# Patient Record
Sex: FEMALE | Race: WHITE | NOT HISPANIC OR LATINO | Employment: OTHER | URBAN - METROPOLITAN AREA
[De-identification: names, ages, dates, MRNs, and addresses within clinical notes are randomized per-mention and may not be internally consistent; named-entity substitution may affect disease eponyms.]

---

## 2018-04-22 ENCOUNTER — OFFICE VISIT (OUTPATIENT)
Dept: RETAIL CLINIC | Facility: CLINIC | Age: 68
End: 2018-04-22

## 2018-04-22 VITALS
DIASTOLIC BLOOD PRESSURE: 64 MMHG | HEIGHT: 64 IN | HEART RATE: 96 BPM | RESPIRATION RATE: 20 BRPM | SYSTOLIC BLOOD PRESSURE: 108 MMHG | WEIGHT: 158 LBS | TEMPERATURE: 98.4 F | BODY MASS INDEX: 26.98 KG/M2 | OXYGEN SATURATION: 97 %

## 2018-04-22 DIAGNOSIS — J01.00 ACUTE MAXILLARY SINUSITIS, RECURRENCE NOT SPECIFIED: ICD-10-CM

## 2018-04-22 DIAGNOSIS — J06.9 ACUTE URI: Primary | ICD-10-CM

## 2018-04-22 LAB
EXPIRATION DATE: NORMAL
FLUAV AG NPH QL: NEGATIVE
FLUBV AG NPH QL: NEGATIVE
INTERNAL CONTROL: NORMAL
Lab: NORMAL

## 2018-04-22 PROCEDURE — 99203 OFFICE O/P NEW LOW 30 MIN: CPT | Performed by: NURSE PRACTITIONER

## 2018-04-22 PROCEDURE — 87804 INFLUENZA ASSAY W/OPTIC: CPT | Performed by: NURSE PRACTITIONER

## 2018-04-22 RX ORDER — AMOXICILLIN AND CLAVULANATE POTASSIUM 875; 125 MG/1; MG/1
1 TABLET, FILM COATED ORAL 2 TIMES DAILY
Qty: 14 TABLET | Refills: 0 | Status: SHIPPED | OUTPATIENT
Start: 2018-04-22 | End: 2018-04-29

## 2018-04-22 RX ORDER — ESTRADIOL 0.1 MG/G
2 CREAM VAGINAL 3 TIMES WEEKLY
COMMUNITY
End: 2019-08-09

## 2018-04-22 RX ORDER — ASPIRIN 81 MG/1
81 TABLET ORAL DAILY
COMMUNITY
End: 2022-03-16

## 2018-04-22 RX ORDER — ESTRADIOL 0.05 MG/D
1 PATCH TRANSDERMAL 3 TIMES WEEKLY
COMMUNITY
End: 2019-08-09

## 2018-04-22 NOTE — PATIENT INSTRUCTIONS
Sinusitis, Adult  Sinusitis is soreness and inflammation of your sinuses. Sinuses are hollow spaces in the bones around your face. They are located:  · Around your eyes.  · In the middle of your forehead.  · Behind your nose.  · In your cheekbones.  Your sinuses and nasal passages are lined with a stringy fluid (mucus). Mucus normally drains out of your sinuses. When your nasal tissues get inflamed or swollen, the mucus can get trapped or blocked so air cannot flow through your sinuses. This lets bacteria, viruses, and funguses grow, and that leads to infection.  Follow these instructions at home:  Medicines   · Take, use, or apply over-the-counter and prescription medicines only as told by your doctor. These may include nasal sprays.  · If you were prescribed an antibiotic medicine, take it as told by your doctor. Do not stop taking the antibiotic even if you start to feel better.  Hydrate and Humidify   · Drink enough water to keep your pee (urine) clear or pale yellow.  · Use a cool mist humidifier to keep the humidity level in your home above 50%.  · Breathe in steam for 10-15 minutes, 3-4 times a day or as told by your doctor. You can do this in the bathroom while a hot shower is running.  · Try not to spend time in cool or dry air.  Rest   · Rest as much as possible.  · Sleep with your head raised (elevated).  · Make sure to get enough sleep each night.  General instructions   · Put a warm, moist washcloth on your face 3-4 times a day or as told by your doctor. This will help with discomfort.  · Wash your hands often with soap and water. If there is no soap and water, use hand .  · Do not smoke. Avoid being around people who are smoking (secondhand smoke).  · Keep all follow-up visits as told by your doctor. This is important.  Contact a doctor if:  · You have a fever.  · Your symptoms get worse.  · Your symptoms do not get better within 10 days.  Get help right away if:  · You have a very bad  "headache.  · You cannot stop throwing up (vomiting).  · You have pain or swelling around your face or eyes.  · You have trouble seeing.  · You feel confused.  · Your neck is stiff.  · You have trouble breathing.  This information is not intended to replace advice given to you by your health care provider. Make sure you discuss any questions you have with your health care provider.  Document Released: 06/05/2009 Document Revised: 08/13/2017 Document Reviewed: 10/12/2016  [employer] Interactive Patient Education © 2017 [employer]  Upper Respiratory Infection, Adult  Most upper respiratory infections (URIs) are caused by a virus. A URI affects the nose, throat, and upper air passages. The most common type of URI is often called \"the common cold.\"  Follow these instructions at home:  · Take medicines only as told by your doctor.  · Gargle warm saltwater or take cough drops to comfort your throat as told by your doctor.  · Use a warm mist humidifier or inhale steam from a shower to increase air moisture. This may make it easier to breathe.  · Drink enough fluid to keep your pee (urine) clear or pale yellow.  · Eat soups and other clear broths.  · Have a healthy diet.  · Rest as needed.  · Go back to work when your fever is gone or your doctor says it is okay.  ¨ You may need to stay home longer to avoid giving your URI to others.  ¨ You can also wear a face mask and wash your hands often to prevent spread of the virus.  · Use your inhaler more if you have asthma.  · Do not use any tobacco products, including cigarettes, chewing tobacco, or electronic cigarettes. If you need help quitting, ask your doctor.  Contact a doctor if:  · You are getting worse, not better.  · Your symptoms are not helped by medicine.  · You have chills.  · You are getting more short of breath.  · You have brown or red mucus.  · You have yellow or brown discharge from your nose.  · You have pain in your face, especially when you bend " forward.  · You have a fever.  · You have puffy (swollen) neck glands.  · You have pain while swallowing.  · You have white areas in the back of your throat.  Get help right away if:  · You have very bad or constant:  ¨ Headache.  ¨ Ear pain.  ¨ Pain in your forehead, behind your eyes, and over your cheekbones (sinus pain).  ¨ Chest pain.  · You have long-lasting (chronic) lung disease and any of the following:  ¨ Wheezing.  ¨ Long-lasting cough.  ¨ Coughing up blood.  ¨ A change in your usual mucus.  · You have a stiff neck.  · You have changes in your:  ¨ Vision.  ¨ Hearing.  ¨ Thinking.  ¨ Mood.  This information is not intended to replace advice given to you by your health care provider. Make sure you discuss any questions you have with your health care provider.  Document Released: 06/05/2009 Document Revised: 08/20/2017 Document Reviewed: 03/25/2015  [employer] Interactive Patient Education © 2017 [employer]    Flu negative, discussed with patient  Medications and plan of care reviewed with patient and teaching done on medication reactions/side effects. Take Mucinex as discussed. Take medication as prescribed, do not take over the counter medications except as discussed, or drink alcohol while on medication.  Monitor for drowsiness with medication, do not drive if drowsiness occurs.  Rest, plenty of fluids, comfort measures, humidifier, warm salt water gargles, saline spray, fever/pain control, and follow up with PCP if symptoms persist.  If worse in any way go to urgent care or ER as discussed.

## 2018-04-22 NOTE — PROGRESS NOTES
Subjective   Racheal De La Vega is a 67 y.o. female.     Flu Symptoms   This is a new problem. The current episode started in the past 7 days. The problem has been gradually worsening. Associated symptoms include chills (intermittent), congestion, coughing (productive yellow), fatigue, headaches, myalgias and a sore throat. Pertinent negatives include no abdominal pain, chest pain, fever, nausea, rash, urinary symptoms or vomiting. Nothing aggravates the symptoms. She has tried NSAIDs (arthritis med OTC for aches) for the symptoms. The treatment provided mild relief.        The following portions of the patient's history were reviewed and updated as appropriate: allergies, current medications, past family history, past medical history, past social history and past surgical history.    Review of Systems   Constitutional: Positive for chills (intermittent) and fatigue. Negative for fever and unexpected weight change.   HENT: Positive for congestion, postnasal drip, rhinorrhea, sinus pressure and sore throat. Negative for ear discharge, ear pain, trouble swallowing and voice change.    Eyes: Negative.    Respiratory: Positive for cough (productive yellow) and chest tightness (chest congestion). Negative for shortness of breath, wheezing and stridor.    Cardiovascular: Negative.  Negative for chest pain.   Gastrointestinal: Negative.  Negative for abdominal pain, diarrhea, nausea and vomiting.   Genitourinary: Negative.    Musculoskeletal: Positive for myalgias.   Skin: Negative.  Negative for rash.   Neurological: Positive for headaches. Negative for dizziness and syncope.       Objective   Physical Exam   Constitutional: She is oriented to person, place, and time. She appears well-developed and well-nourished. No distress.   HENT:   Head: Normocephalic and atraumatic.   Right Ear: A middle ear effusion is present.   Left Ear: A middle ear effusion is present.   Nose: Mucosal edema and sinus tenderness (mild tenderness  on palpation) present. Right sinus exhibits maxillary sinus tenderness. Left sinus exhibits maxillary sinus tenderness.   Mouth/Throat: Uvula is midline and mucous membranes are normal. Posterior oropharyngeal erythema (mild erythema with PND) present. Tonsils are 0 on the right. Tonsils are 0 on the left. No tonsillar exudate.   Eyes: Conjunctivae and lids are normal. Pupils are equal, round, and reactive to light.   Neck: Normal range of motion.   Cardiovascular: Normal rate, regular rhythm and normal heart sounds.    No murmur heard.  Pulmonary/Chest: Effort normal and breath sounds normal. No respiratory distress.   Lymphadenopathy:     She has no cervical adenopathy.   Neurological: She is alert and oriented to person, place, and time.   Skin: Skin is warm and dry.   Psychiatric: She has a normal mood and affect. Her speech is normal and behavior is normal.       Assessment/Plan   Racheal was seen today for flu symptoms.    Diagnoses and all orders for this visit:    Acute URI  -     POC Influenza A / B  Results for orders placed or performed in visit on 04/22/18   POC Influenza A / B   Result Value Ref Range    Rapid Influenza A Ag negative     Rapid Influenza B Ag negative     Internal Control Passed Passed    Lot Number [id number]     Expiration Date 8/2,020        Acute maxillary sinusitis, recurrence not specified  -     amoxicillin-clavulanate (AUGMENTIN) 875-125 MG per tablet; Take 1 tablet by mouth 2 (Two) Times a Day for 7 days. (take if worse as discussed)      Flu negative, discussed with patient  Medications and plan of care reviewed with patient and teaching done on medication reactions/side effects. Take Mucinex as discussed. Take medication as prescribed, do not take over the counter medications except as discussed, or drink alcohol while on medication.  Monitor for drowsiness with medication, do not drive if drowsiness occurs.  Rest, plenty of fluids, comfort measures, humidifier, warm salt water  gargles, saline spray, fever/pain control, and follow up with PCP if symptoms persist.  If worse in any way go to urgent care or ER as discussed.  Patient verbalized understanding.    NATALIE Marie

## 2019-03-09 ENCOUNTER — OFFICE VISIT (OUTPATIENT)
Dept: RETAIL CLINIC | Facility: CLINIC | Age: 69
End: 2019-03-09

## 2019-03-09 VITALS
HEART RATE: 90 BPM | DIASTOLIC BLOOD PRESSURE: 64 MMHG | RESPIRATION RATE: 18 BRPM | SYSTOLIC BLOOD PRESSURE: 118 MMHG | OXYGEN SATURATION: 98 % | TEMPERATURE: 97.8 F

## 2019-03-09 DIAGNOSIS — J40 BRONCHITIS: Primary | ICD-10-CM

## 2019-03-09 DIAGNOSIS — J06.9 ACUTE URI: ICD-10-CM

## 2019-03-09 PROCEDURE — 99213 OFFICE O/P EST LOW 20 MIN: CPT | Performed by: NURSE PRACTITIONER

## 2019-03-09 RX ORDER — METHYLPREDNISOLONE 4 MG/1
TABLET ORAL
Qty: 21 EACH | Refills: 0 | Status: SHIPPED | OUTPATIENT
Start: 2019-03-09 | End: 2019-12-23

## 2019-03-09 RX ORDER — BROMPHENIRAMINE MALEATE, PSEUDOEPHEDRINE HYDROCHLORIDE, AND DEXTROMETHORPHAN HYDROBROMIDE 2; 30; 10 MG/5ML; MG/5ML; MG/5ML
5 SYRUP ORAL 4 TIMES DAILY PRN
Qty: 118 ML | Refills: 0 | Status: SHIPPED | OUTPATIENT
Start: 2019-03-09 | End: 2019-12-23

## 2019-03-09 NOTE — PROGRESS NOTES
Mis De La Vega is a 68 y.o. female.     URI    This is a new problem. The current episode started in the past 7 days. The problem has been gradually worsening. There has been no fever. Associated symptoms include congestion, coughing, headaches, rhinorrhea, sinus pain, sneezing, a sore throat and wheezing. Pertinent negatives include no nausea or vomiting. She has tried acetaminophen (sudafed, zyrtec, ) for the symptoms.        The following portions of the patient's history were reviewed and updated as appropriate: allergies, current medications, past family history, past medical history, past social history, past surgical history and problem list.    Review of Systems   HENT: Positive for congestion, rhinorrhea, sneezing and sore throat.    Respiratory: Positive for cough, shortness of breath and wheezing.    Gastrointestinal: Negative for nausea and vomiting.   Neurological: Positive for headache.       Objective   Physical Exam   Constitutional: She is cooperative. No distress.   HENT:   Head: Normocephalic.   Right Ear: Hearing, external ear and ear canal normal. Tympanic membrane is not erythematous. A middle ear effusion is present.   Left Ear: Hearing, external ear and ear canal normal. Tympanic membrane is not erythematous. A middle ear effusion is present.   Nose: Mucosal edema, rhinorrhea and congestion present.   Mouth/Throat: Posterior oropharyngeal erythema present.   Eyes: Conjunctivae, EOM and lids are normal. Pupils are equal, round, and reactive to light.   Neck: Trachea normal and full passive range of motion without pain.   Cardiovascular: Normal rate, regular rhythm and normal pulses.   Pulmonary/Chest: Effort normal. She has wheezes in the right upper field and the left upper field.   Lymphadenopathy:     She has cervical adenopathy.        Right cervical: Superficial cervical adenopathy present.   Neurological: She is alert.   Skin: Skin is warm. Capillary refill takes less  than 2 seconds.   Psychiatric: She has a normal mood and affect. Her speech is normal and behavior is normal.   Vitals reviewed.        Assessment/Plan   Racheal was seen today for uri.    Diagnoses and all orders for this visit:    Bronchitis    Acute URI    Other orders  -     methylPREDNISolone (MEDROL, ÁNGEL,) 4 MG tablet; Take as directed on package instructions.  -     brompheniramine-pseudoephedrine-DM (BROMFED DM) 30-2-10 MG/5ML syrup; Take 5 mL by mouth 4 (Four) Times a Day As Needed for Allergies.        If any worsening of symptoms follow up with PCP

## 2019-08-09 ENCOUNTER — OFFICE VISIT (OUTPATIENT)
Dept: RETAIL CLINIC | Facility: CLINIC | Age: 69
End: 2019-08-09

## 2019-08-09 VITALS
WEIGHT: 158 LBS | SYSTOLIC BLOOD PRESSURE: 112 MMHG | OXYGEN SATURATION: 98 % | TEMPERATURE: 98.1 F | HEIGHT: 65 IN | RESPIRATION RATE: 16 BRPM | DIASTOLIC BLOOD PRESSURE: 78 MMHG | BODY MASS INDEX: 26.33 KG/M2 | HEART RATE: 75 BPM

## 2019-08-09 DIAGNOSIS — N30.01 ACUTE CYSTITIS WITH HEMATURIA: Primary | ICD-10-CM

## 2019-08-09 DIAGNOSIS — R30.0 DYSURIA: ICD-10-CM

## 2019-08-09 LAB
BILIRUB BLD-MCNC: NEGATIVE MG/DL
CLARITY, POC: CLEAR
COLOR UR: YELLOW
GLUCOSE UR STRIP-MCNC: NEGATIVE MG/DL
KETONES UR QL: NEGATIVE
LEUKOCYTE EST, POC: ABNORMAL
NITRITE UR-MCNC: NEGATIVE MG/ML
PH UR: 5 [PH] (ref 5–8)
PROT UR STRIP-MCNC: NEGATIVE MG/DL
RBC # UR STRIP: ABNORMAL /UL
SP GR UR: 1.01 (ref 1–1.03)
UROBILINOGEN UR QL: NORMAL

## 2019-08-09 PROCEDURE — 99213 OFFICE O/P EST LOW 20 MIN: CPT | Performed by: NURSE PRACTITIONER

## 2019-08-09 RX ORDER — PHENAZOPYRIDINE HYDROCHLORIDE 200 MG/1
200 TABLET, FILM COATED ORAL 3 TIMES DAILY PRN
Qty: 6 TABLET | Refills: 0 | Status: SHIPPED | OUTPATIENT
Start: 2019-08-09 | End: 2019-08-11

## 2019-08-09 RX ORDER — ESTRADIOL 0.05 MG/D
FILM, EXTENDED RELEASE TRANSDERMAL
Refills: 1 | COMMUNITY
Start: 2019-08-06 | End: 2019-12-23 | Stop reason: ALTCHOICE

## 2019-08-09 RX ORDER — NITROFURANTOIN 25; 75 MG/1; MG/1
100 CAPSULE ORAL 2 TIMES DAILY
Qty: 10 CAPSULE | Refills: 0 | Status: SHIPPED | OUTPATIENT
Start: 2019-08-09 | End: 2019-08-14

## 2019-08-09 NOTE — PROGRESS NOTES
Subjective   Chief Complaint   Patient presents with   • Urinary Tract Infection       Racheal De La Vega is a 68 y.o. female.     Urinary Tract Infection    This is a new problem. Episode onset: 3 days. The problem occurs every urination. The problem has been gradually worsening. The quality of the pain is described as burning and aching. The pain is moderate. There has been no fever. Associated symptoms include flank pain, frequency and urgency. Pertinent negatives include no chills, discharge, hematuria, hesitancy, nausea, possible pregnancy, sweats or vomiting. She has tried nothing for the symptoms.        No Known Allergies    Past Medical History:   Diagnosis Date   • Diverticulitis of colon        Past Surgical History:   Procedure Laterality Date   • BLADDER SURGERY     • CATARACT EXTRACTION, BILATERAL     • JOINT REPLACEMENT         Social History     Socioeconomic History   • Marital status:      Spouse name: Not on file   • Number of children: Not on file   • Years of education: Not on file   • Highest education level: Not on file   Tobacco Use   • Smoking status: Former Smoker     Last attempt to quit:      Years since quittin.6   Substance and Sexual Activity   • Alcohol use: No   • Drug use: No   • Sexual activity: Defer       History reviewed. No pertinent family history.      Current Outpatient Medications:   •  aspirin 81 MG EC tablet, Take 81 mg by mouth Daily., Disp: , Rfl:   •  Calcium Carbonate-Vitamin D (CALTRATE 600+D PO), Take  by mouth., Disp: , Rfl:   •  cholecalciferol (VITAMIN D3) 1000 units tablet, Take 1,000 Units by mouth Daily., Disp: , Rfl:   •  estradiol (MINIVELLE, VIVELLE-DOT) 0.05 MG/24HR patch, APPLY 1 PATCH TOPICALLY TWICE A WEEK, Disp: , Rfl: 1  •  brompheniramine-pseudoephedrine-DM (BROMFED DM) 30-2-10 MG/5ML syrup, Take 5 mL by mouth 4 (Four) Times a Day As Needed for Allergies., Disp: 118 mL, Rfl: 0  •  methylPREDNISolone (MEDROL, ÁNGEL,) 4 MG tablet, Take as  "directed on package instructions., Disp: 21 each, Rfl: 0  •  nitrofurantoin, macrocrystal-monohydrate, (MACROBID) 100 MG capsule, Take 1 capsule by mouth 2 (Two) Times a Day for 5 days., Disp: 10 capsule, Rfl: 0  •  phenazopyridine (PYRIDIUM) 200 MG tablet, Take 1 tablet by mouth 3 (Three) Times a Day As Needed for bladder spasms for up to 2 days., Disp: 6 tablet, Rfl: 0      Review of Systems   Constitutional: Negative.  Negative for chills.   Gastrointestinal: Negative for abdominal distention, abdominal pain, nausea and vomiting.   Genitourinary: Positive for dysuria, flank pain, frequency, pelvic pain and urgency. Negative for urinary incontinence, decreased urine volume, hematuria and hesitancy.        Vitals:    08/09/19 1402   BP: 112/78   BP Location: Left arm   Patient Position: Sitting   Pulse: 75   Resp: 16   Temp: 98.1 °F (36.7 °C)   TempSrc: Oral   SpO2: 98%   Weight: 71.7 kg (158 lb)   Height: 163.8 cm (64.5\")       Objective   Physical Exam   Constitutional: She is oriented to person, place, and time. She appears well-developed and well-nourished.   HENT:   Head: Normocephalic.   Eyes: Conjunctivae are normal.   Cardiovascular: Normal rate, regular rhythm, S1 normal, S2 normal and normal heart sounds.   Pulmonary/Chest: Effort normal and breath sounds normal.   Abdominal: Soft. Normal appearance. There is tenderness in the suprapubic area. There is no CVA tenderness.   Lymphadenopathy:     She has no cervical adenopathy.   Neurological: She is alert and oriented to person, place, and time.        Procedures     Assessment/Plan   Racheal was seen today for urinary tract infection.    Diagnoses and all orders for this visit:    Acute cystitis with hematuria  -     nitrofurantoin, macrocrystal-monohydrate, (MACROBID) 100 MG capsule; Take 1 capsule by mouth 2 (Two) Times a Day for 5 days.  -     phenazopyridine (PYRIDIUM) 200 MG tablet; Take 1 tablet by mouth 3 (Three) Times a Day As Needed for bladder " spasms for up to 2 days.    Dysuria  -     POC Urinalysis Dipstick, Automated  -     nitrofurantoin, macrocrystal-monohydrate, (MACROBID) 100 MG capsule; Take 1 capsule by mouth 2 (Two) Times a Day for 5 days.  -     phenazopyridine (PYRIDIUM) 200 MG tablet; Take 1 tablet by mouth 3 (Three) Times a Day As Needed for bladder spasms for up to 2 days.        Results for orders placed or performed in visit on 08/09/19   POC Urinalysis Dipstick, Automated   Result Value Ref Range    Color Yellow Yellow, Straw, Dark Yellow, Allison    Clarity, UA Clear Clear    Specific Gravity  1.010 1.005 - 1.030    pH, Urine 5.0 5.0 - 8.0    Leukocytes Moderate (2+) (A) Negative    Nitrite, UA Negative Negative    Protein, POC Negative Negative mg/dL    Glucose, UA Negative Negative, 1000 mg/dL (3+) mg/dL    Ketones, UA Negative Negative    Urobilinogen, UA Normal Normal    Bilirubin Negative Negative    Blood, UA Trace (A) Negative       PLAN: Discussed dosing, side effects, recommended other symptomatic care.  Patient should follow up with primary care provider if symptoms worsen, fail to resolve or other symptoms need attention. Patient/family agree to the above. Advised to wipe front to back, stay hydrated with water to flush the kidneys, avoid caffeinated beverages.    An After Visit Summary was printed and given to the patient.    NATALIE Busch

## 2019-08-09 NOTE — PATIENT INSTRUCTIONS
Urinary Tract Infection, Adult  A urinary tract infection (UTI) is an infection of any part of the urinary tract. The urinary tract includes the:  · Kidneys.  · Ureters.  · Bladder.  · Urethra.  These organs make, store, and get rid of pee (urine) in the body.  Follow these instructions at home:    · Take over-the-counter and prescription medicines only as told by your doctor.  · If you were prescribed an antibiotic medicine, take it as told by your doctor. Do not stop taking it even if you start to feel better.  · Drink enough fluid to keep your pee (urine) pale yellow. For most people, this is 6-10 glasses of water each day.  · Keep all follow-up visits as told by your doctor. This is important.  · Make sure you:  ? Empty your bladder often and completely. Do not hold pee for long periods of time.  ? Empty your bladder after sex.  ? Wipe from front to back after a bowel movement if you are female. Use each tissue one time when you wipe.  Contact a doctor if:  · Your symptoms do not get better after 1-2 days.  · Your symptoms go away and then come back.  Get help right away if:  · You have very bad pain in your back.  · You have very bad pain in your lower belly (abdomen).  · You have a fever.  · You are sick to your stomach (nauseous).  · You are throwing up (vomiting).  Summary  · A urinary tract infection (UTI) is an infection of any part of the urinary tract.  · If you were prescribed an antibiotic medicine, take it as told by your doctor. Do not stop taking it even if you start to feel better.  · Drink enough fluid to keep your pee (urine) pale yellow.  This information is not intended to replace advice given to you by your health care provider. Make sure you discuss any questions you have with your health care provider.  Document Released: 06/05/2009 Document Revised: 02/12/2019 Document Reviewed: 11/07/2016  [employer] Patient Education © 2019 [employer]

## 2019-12-23 ENCOUNTER — OFFICE VISIT (OUTPATIENT)
Dept: RETAIL CLINIC | Facility: CLINIC | Age: 69
End: 2019-12-23

## 2019-12-23 VITALS
DIASTOLIC BLOOD PRESSURE: 78 MMHG | SYSTOLIC BLOOD PRESSURE: 124 MMHG | TEMPERATURE: 98.2 F | RESPIRATION RATE: 20 BRPM | HEIGHT: 65 IN | HEART RATE: 68 BPM | WEIGHT: 159 LBS | OXYGEN SATURATION: 98 % | BODY MASS INDEX: 26.49 KG/M2

## 2019-12-23 DIAGNOSIS — J06.9 VIRAL UPPER RESPIRATORY TRACT INFECTION: Primary | ICD-10-CM

## 2019-12-23 PROCEDURE — 99213 OFFICE O/P EST LOW 20 MIN: CPT | Performed by: NURSE PRACTITIONER

## 2019-12-23 RX ORDER — ATORVASTATIN CALCIUM 40 MG/1
40 TABLET, FILM COATED ORAL DAILY
COMMUNITY
Start: 2019-10-28

## 2019-12-23 RX ORDER — AZITHROMYCIN 250 MG/1
TABLET, FILM COATED ORAL
Qty: 6 TABLET | Refills: 0 | Status: SHIPPED | OUTPATIENT
Start: 2019-12-23 | End: 2021-09-18

## 2019-12-23 RX ORDER — BROMPHENIRAMINE MALEATE, PSEUDOEPHEDRINE HYDROCHLORIDE, AND DEXTROMETHORPHAN HYDROBROMIDE 2; 30; 10 MG/5ML; MG/5ML; MG/5ML
5 SYRUP ORAL 4 TIMES DAILY PRN
Qty: 120 ML | Refills: 0 | Status: SHIPPED | OUTPATIENT
Start: 2019-12-23 | End: 2021-09-18

## 2019-12-23 RX ORDER — ESTRADIOL 0.03 MG/D
1 FILM, EXTENDED RELEASE TRANSDERMAL 2 TIMES WEEKLY
COMMUNITY
Start: 2019-11-11

## 2019-12-23 NOTE — PATIENT INSTRUCTIONS
"Upper Respiratory Infection, Adult  An upper respiratory infection (URI) affects the nose, throat, and upper air passages. URIs are caused by germs (viruses). The most common type of URI is often called \"the common cold.\"  Medicines cannot cure URIs, but you can do things at home to relieve your symptoms. URIs usually get better within 7-10 days.  Follow these instructions at home:  Activity  · Rest as needed.  · If you have a fever, stay home from work or school until your fever is gone, or until your doctor says you may return to work or school.  ? You should stay home until you cannot spread the infection anymore (you are not contagious).  ? Your doctor may have you wear a face mask so you have less risk of spreading the infection.  Relieving symptoms  · Gargle with a salt-water mixture 3-4 times a day or as needed. To make a salt-water mixture, completely dissolve ½-1 tsp of salt in 1 cup of warm water.  · Use a cool-mist humidifier to add moisture to the air. This can help you breathe more easily.  Eating and drinking    · Drink enough fluid to keep your pee (urine) pale yellow.  · Eat soups and other clear broths.  General instructions    · Take over-the-counter and prescription medicines only as told by your doctor. These include cold medicines, fever reducers, and cough suppressants.  · Do not use any products that contain nicotine or tobacco. These include cigarettes and e-cigarettes. If you need help quitting, ask your doctor.  · Avoid being where people are smoking (avoid secondhand smoke).  · Make sure you get regular shots and get the flu shot every year.  · Keep all follow-up visits as told by your doctor. This is important.  How to avoid spreading infection to others    · Wash your hands often with soap and water. If you do not have soap and water, use hand .  · Avoid touching your mouth, face, eyes, or nose.  · Cough or sneeze into a tissue or your sleeve or elbow. Do not cough or sneeze " "into your hand or into the air.  Contact a doctor if:  · You are getting worse, not better.  · You have any of these:  ? A fever.  ? Chills.  ? Brown or red mucus in your nose.  ? Yellow or brown fluid (discharge)coming from your nose.  ? Pain in your face, especially when you bend forward.  ? Swollen neck glands.  ? Pain with swallowing.  ? White areas in the back of your throat.  Get help right away if:  · You have shortness of breath that gets worse.  · You have very bad or constant:  ? Headache.  ? Ear pain.  ? Pain in your forehead, behind your eyes, and over your cheekbones (sinus pain).  ? Chest pain.  · You have long-lasting (chronic) lung disease along with any of these:  ? Wheezing.  ? Long-lasting cough.  ? Coughing up blood.  ? A change in your usual mucus.  · You have a stiff neck.  · You have changes in your:  ? Vision.  ? Hearing.  ? Thinking.  ? Mood.  Summary  · An upper respiratory infection (URI) is caused by a germ called a virus. The most common type of URI is often called \"the common cold.\"  · URIs usually get better within 7-10 days.  · Take over-the-counter and prescription medicines only as told by your doctor.  This information is not intended to replace advice given to you by your health care provider. Make sure you discuss any questions you have with your health care provider.  Document Released: 06/05/2009 Document Revised: 08/10/2018 Document Reviewed: 08/10/2018  [employer] Interactive Patient Education © 2019 [employer]    "

## 2019-12-23 NOTE — PROGRESS NOTES
"Mis De La Vega is a 69 y.o. female.   Chief Complaint   Patient presents with   • URI      70 yo w/f presents with complaint of cough for a week or more and \"I need an antibiotic\".  She denies fever, shortness of breath or wheezing.  She has taken no otc medication for symptom. Refer to HPI/ROS for additional information.    URI    This is a new problem. The current episode started in the past 7 days. The problem has been gradually worsening. There has been no fever. Associated symptoms include congestion and coughing. Pertinent negatives include no ear pain, rhinorrhea, sinus pain, sore throat or wheezing. She has tried nothing for the symptoms.        The following portions of the patient's history were reviewed and updated as appropriate: allergies, current medications, past family history, past medical history, past social history, past surgical history and problem list.    Current Outpatient Medications:   •  aspirin 81 MG EC tablet, Take 81 mg by mouth Daily., Disp: , Rfl:   •  atorvastatin (LIPITOR) 40 MG tablet, Take 40 mg by mouth Daily., Disp: , Rfl:   •  Calcium Carbonate-Vitamin D (CALTRATE 600+D PO), Take  by mouth., Disp: , Rfl:   •  cholecalciferol (VITAMIN D3) 1000 units tablet, Take 1,000 Units by mouth Daily., Disp: , Rfl:   •  Estradiol (ESTRACE VA), Insert  into the vagina., Disp: , Rfl:   •  estradiol (VIVELLE-DOT) 0.025 MG/24HR patch, APPLY 1 PATCH TOPICALLY TWICE A WEEK AS DIRECTED, Disp: , Rfl:   •  azithromycin (ZITHROMAX Z-ÁNGEL) 250 MG tablet, Take 2 tablets the first day, then 1 tablet daily for 4 days., Disp: 6 tablet, Rfl: 0  •  brompheniramine-pseudoephedrine-DM 30-2-10 MG/5ML syrup, Take 5 mL by mouth 4 (Four) Times a Day As Needed for Congestion or Cough., Disp: 120 mL, Rfl: 0    Review of Systems   Constitutional: Negative.    HENT: Positive for congestion. Negative for ear pain, rhinorrhea, sinus pressure, sinus pain, sore throat and trouble swallowing.    Eyes: " "Negative.    Respiratory: Positive for cough. Negative for apnea, choking, chest tightness, shortness of breath, wheezing and stridor.    Cardiovascular: Negative.    Gastrointestinal: Negative.    Psychiatric/Behavioral: Negative.      /78   Pulse 68   Temp 98.2 °F (36.8 °C) (Oral)   Resp 20   Ht 163.8 cm (64.5\")   Wt 72.1 kg (159 lb)   SpO2 98%   BMI 26.87 kg/m²     Objective   No Known Allergies    Physical Exam   Constitutional: She is oriented to person, place, and time. She appears well-developed and well-nourished. No distress.   HENT:   Right Ear: External ear normal.   Left Ear: External ear normal.   Nose: Nose normal.   Mouth/Throat: Oropharynx is clear and moist. No oropharyngeal exudate.   Eyes: Conjunctivae are normal.   Neck: Normal range of motion. Neck supple. No JVD present. No tracheal deviation present. No thyromegaly present.   Cardiovascular: Normal rate, regular rhythm and normal heart sounds.   Pulmonary/Chest: Effort normal and breath sounds normal. No stridor. No respiratory distress. She has no wheezes. She has no rales. She exhibits no tenderness.   Neurological: She is alert and oriented to person, place, and time.   Skin: Skin is warm. Capillary refill takes less than 2 seconds. She is not diaphoretic.   Psychiatric: She has a normal mood and affect. Her behavior is normal. Judgment and thought content normal.   Vitals reviewed.      Assessment/Plan   Racheal was seen today for uri.    Diagnoses and all orders for this visit:    Viral upper respiratory tract infection    Other orders  -     azithromycin (ZITHROMAX Z-ÁNGEL) 250 MG tablet; Take 2 tablets the first day, then 1 tablet daily for 4 days.  -     brompheniramine-pseudoephedrine-DM 30-2-10 MG/5ML syrup; Take 5 mL by mouth 4 (Four) Times a Day As Needed for Congestion or Cough.        Your illness appears viral at this time and will not respond to antibiotics.  Pt voices concern that illness will become bacterial and " they will need an antibiotic.  Antibiotic is prescribed today with the agreement that pt will wait 3-4 days to fill it, and will begin taking it only if illness is not improving or is worse at that time. Pt verbalizes understanding and agrees with treatment plan.    An After Visit Summary was printed, reviewed, and given to the patient. Understanding verbalized and agrees with treatment plan.  If no improvement or becomes worse, follow up with primary or go to [facility]/ER.          December 23, 2019 4:13 PM

## 2021-06-22 ENCOUNTER — HOSPITAL ENCOUNTER (EMERGENCY)
Facility: HOSPITAL | Age: 71
Discharge: HOME | End: 2021-06-22
Attending: EMERGENCY MEDICINE | Admitting: EMERGENCY MEDICINE

## 2021-06-22 ENCOUNTER — APPOINTMENT (OUTPATIENT)
Dept: CARDIOLOGY | Facility: HOSPITAL | Age: 71
End: 2021-06-22

## 2021-06-22 VITALS
SYSTOLIC BLOOD PRESSURE: 118 MMHG | RESPIRATION RATE: 18 BRPM | WEIGHT: 157 LBS | DIASTOLIC BLOOD PRESSURE: 91 MMHG | BODY MASS INDEX: 26.8 KG/M2 | TEMPERATURE: 98.7 F | HEIGHT: 64 IN | OXYGEN SATURATION: 98 % | HEART RATE: 74 BPM

## 2021-06-22 DIAGNOSIS — R25.2 MUSCLE CRAMPS: ICD-10-CM

## 2021-06-22 DIAGNOSIS — M79.622 LEFT UPPER ARM PAIN: Primary | ICD-10-CM

## 2021-06-22 LAB
ALBUMIN SERPL-MCNC: 3.9 G/DL (ref 3.5–5.2)
ALBUMIN/GLOB SERPL: 1.5 G/DL
ALP SERPL-CCNC: 101 U/L (ref 39–117)
ALT SERPL W P-5'-P-CCNC: 16 U/L (ref 1–33)
ANION GAP SERPL CALCULATED.3IONS-SCNC: 9 MMOL/L (ref 5–15)
AST SERPL-CCNC: 19 U/L (ref 1–32)
BASOPHILS # BLD AUTO: 0.09 10*3/MM3 (ref 0–0.2)
BASOPHILS NFR BLD AUTO: 1 % (ref 0–1.5)
BH CV UPPER VENOUS LEFT AXILLARY AUGMENT: NORMAL
BH CV UPPER VENOUS LEFT AXILLARY COMPETENT: NORMAL
BH CV UPPER VENOUS LEFT AXILLARY COMPRESS: NORMAL
BH CV UPPER VENOUS LEFT AXILLARY PHASIC: NORMAL
BH CV UPPER VENOUS LEFT AXILLARY SPONT: NORMAL
BH CV UPPER VENOUS LEFT BASILIC FOREARM COMPRESS: NORMAL
BH CV UPPER VENOUS LEFT BASILIC UPPER COMPRESS: NORMAL
BH CV UPPER VENOUS LEFT BRACHIAL AUGMENT: NORMAL
BH CV UPPER VENOUS LEFT BRACHIAL COMPRESS: NORMAL
BH CV UPPER VENOUS LEFT CEPHALIC FOREARM COMPRESS: NORMAL
BH CV UPPER VENOUS LEFT CEPHALIC UPPER COMPRESS: NORMAL
BH CV UPPER VENOUS LEFT INTERNAL JUGULAR AUGMENT: NORMAL
BH CV UPPER VENOUS LEFT INTERNAL JUGULAR COMPETENT: NORMAL
BH CV UPPER VENOUS LEFT INTERNAL JUGULAR COMPRESS: NORMAL
BH CV UPPER VENOUS LEFT INTERNAL JUGULAR PHASIC: NORMAL
BH CV UPPER VENOUS LEFT INTERNAL JUGULAR SPONT: NORMAL
BH CV UPPER VENOUS LEFT RADIAL AUGMENT: NORMAL
BH CV UPPER VENOUS LEFT RADIAL COMPRESS: NORMAL
BH CV UPPER VENOUS LEFT SUBCLAVIAN AUGMENT: NORMAL
BH CV UPPER VENOUS LEFT SUBCLAVIAN COMPETENT: NORMAL
BH CV UPPER VENOUS LEFT SUBCLAVIAN COMPRESS: NORMAL
BH CV UPPER VENOUS LEFT SUBCLAVIAN PHASIC: NORMAL
BH CV UPPER VENOUS LEFT SUBCLAVIAN SPONT: NORMAL
BH CV UPPER VENOUS LEFT ULNAR AUGMENT: NORMAL
BH CV UPPER VENOUS LEFT ULNAR COMPRESS: NORMAL
BH CV UPPER VENOUS RIGHT SUBCLAVIAN AUGMENT: NORMAL
BH CV UPPER VENOUS RIGHT SUBCLAVIAN COMPETENT: NORMAL
BH CV UPPER VENOUS RIGHT SUBCLAVIAN COMPRESS: NORMAL
BH CV UPPER VENOUS RIGHT SUBCLAVIAN PHASIC: NORMAL
BH CV UPPER VENOUS RIGHT SUBCLAVIAN SPONT: NORMAL
BILIRUB SERPL-MCNC: 0.2 MG/DL (ref 0–1.2)
BUN SERPL-MCNC: 8 MG/DL (ref 8–23)
BUN/CREAT SERPL: 11.6 (ref 7–25)
CALCIUM SPEC-SCNC: 9.7 MG/DL (ref 8.6–10.5)
CHLORIDE SERPL-SCNC: 102 MMOL/L (ref 98–107)
CO2 SERPL-SCNC: 26 MMOL/L (ref 22–29)
CREAT SERPL-MCNC: 0.69 MG/DL (ref 0.57–1)
DEPRECATED RDW RBC AUTO: 41.4 FL (ref 37–54)
EOSINOPHIL # BLD AUTO: 0.3 10*3/MM3 (ref 0–0.4)
EOSINOPHIL NFR BLD AUTO: 3.4 % (ref 0.3–6.2)
ERYTHROCYTE [DISTWIDTH] IN BLOOD BY AUTOMATED COUNT: 12.6 % (ref 12.3–15.4)
GFR SERPL CREATININE-BSD FRML MDRD: 84 ML/MIN/1.73
GLOBULIN UR ELPH-MCNC: 2.6 GM/DL
GLUCOSE SERPL-MCNC: 156 MG/DL (ref 65–99)
HCT VFR BLD AUTO: 37.8 % (ref 34–46.6)
HGB BLD-MCNC: 12.1 G/DL (ref 12–15.9)
HOLD SPECIMEN: NORMAL
IMM GRANULOCYTES # BLD AUTO: 0.03 10*3/MM3 (ref 0–0.05)
IMM GRANULOCYTES NFR BLD AUTO: 0.3 % (ref 0–0.5)
LYMPHOCYTES # BLD AUTO: 2.63 10*3/MM3 (ref 0.7–3.1)
LYMPHOCYTES NFR BLD AUTO: 29.4 % (ref 19.6–45.3)
MAXIMAL PREDICTED HEART RATE: 150 BPM
MCH RBC QN AUTO: 29.4 PG (ref 26.6–33)
MCHC RBC AUTO-ENTMCNC: 32 G/DL (ref 31.5–35.7)
MCV RBC AUTO: 91.7 FL (ref 79–97)
MONOCYTES # BLD AUTO: 0.66 10*3/MM3 (ref 0.1–0.9)
MONOCYTES NFR BLD AUTO: 7.4 % (ref 5–12)
NEUTROPHILS NFR BLD AUTO: 5.24 10*3/MM3 (ref 1.7–7)
NEUTROPHILS NFR BLD AUTO: 58.5 % (ref 42.7–76)
NRBC BLD AUTO-RTO: 0 /100 WBC (ref 0–0.2)
PLATELET # BLD AUTO: 347 10*3/MM3 (ref 140–450)
PMV BLD AUTO: 9.4 FL (ref 6–12)
POTASSIUM SERPL-SCNC: 4 MMOL/L (ref 3.5–5.2)
PROT SERPL-MCNC: 6.5 G/DL (ref 6–8.5)
RBC # BLD AUTO: 4.12 10*6/MM3 (ref 3.77–5.28)
SODIUM SERPL-SCNC: 137 MMOL/L (ref 136–145)
STRESS TARGET HR: 128 BPM
WBC # BLD AUTO: 8.95 10*3/MM3 (ref 3.4–10.8)
WHOLE BLOOD HOLD SPECIMEN: NORMAL

## 2021-06-22 PROCEDURE — 93005 ELECTROCARDIOGRAM TRACING: CPT | Performed by: EMERGENCY MEDICINE

## 2021-06-22 PROCEDURE — 85025 COMPLETE CBC W/AUTO DIFF WBC: CPT

## 2021-06-22 PROCEDURE — 93971 EXTREMITY STUDY: CPT

## 2021-06-22 PROCEDURE — 93005 ELECTROCARDIOGRAM TRACING: CPT

## 2021-06-22 PROCEDURE — 63710000001 PREDNISONE PER 1 MG: Performed by: PHYSICIAN ASSISTANT

## 2021-06-22 PROCEDURE — 99283 EMERGENCY DEPT VISIT LOW MDM: CPT

## 2021-06-22 PROCEDURE — 80053 COMPREHEN METABOLIC PANEL: CPT | Performed by: EMERGENCY MEDICINE

## 2021-06-22 RX ORDER — SODIUM CHLORIDE 0.9 % (FLUSH) 0.9 %
10 SYRINGE (ML) INJECTION AS NEEDED
Status: DISCONTINUED | OUTPATIENT
Start: 2021-06-22 | End: 2021-06-22 | Stop reason: HOSPADM

## 2021-06-22 RX ORDER — METHYLPREDNISOLONE 4 MG/1
TABLET ORAL
Qty: 1 EACH | Refills: 0 | Status: SHIPPED | OUTPATIENT
Start: 2021-06-22 | End: 2021-09-18

## 2021-06-22 RX ORDER — PREDNISONE 20 MG/1
60 TABLET ORAL ONCE
Status: COMPLETED | OUTPATIENT
Start: 2021-06-22 | End: 2021-06-22

## 2021-06-22 RX ORDER — CYCLOBENZAPRINE HCL 10 MG
10 TABLET ORAL ONCE
Status: COMPLETED | OUTPATIENT
Start: 2021-06-22 | End: 2021-06-22

## 2021-06-22 RX ORDER — CYCLOBENZAPRINE HCL 10 MG
10 TABLET ORAL 3 TIMES DAILY PRN
Qty: 15 TABLET | Refills: 0 | Status: SHIPPED | OUTPATIENT
Start: 2021-06-22 | End: 2021-09-18

## 2021-06-22 RX ADMIN — CYCLOBENZAPRINE HYDROCHLORIDE 10 MG: 10 TABLET, FILM COATED ORAL at 15:06

## 2021-06-22 RX ADMIN — PREDNISONE 60 MG: 20 TABLET ORAL at 15:06

## 2021-06-22 NOTE — ED PROVIDER NOTES
"Subjective   Pt is a 69 yo female presenting to ED with complaints of left arm pain. PMHx significant for HLD and Diverticulitis. Pt reports having x2 Pfizer vaccines in her left upper arm with 2nd dose 4-21-21. She reports several days after the vaccine she developed stiffness and pain in left upper arm. Pain radiates into left neck and down to left elbow at times. She reports pain is worse with moving arm inward and outward but no pain with elevation. She reports some swelling to left upper arm but denies color change, weakness or numbness. She has been evaluated by her PCP Dr. Dominguez and had a normal xray. She was referred to Physical Therapy which she went to last week. She reports the therapist told her she had muscle knots in that area. She states the PT has not helped relieve the pain. She can take Tylenol and Motrin with moderate relief but once the meds wear off she \"can't sleep\". She does have prior hx of left shoulder dislocation and surgery about 20 years ago but denies any persistent problems in that shoulder. She denies any known injury. She also complains of left groin / outer hip pain that started up in the last few weeks but denies any known injury. She has been less active due to the left arm pain. She denies headache, dizziness, fever, chills, CP, SOB, cough, N/V/D, abdominal pain, urinary sx or leg swelling. She denies tobacco, drug or ETOH use.      History provided by:  Patient, medical records and spouse      Review of Systems   Constitutional: Negative for fever.   Eyes: Negative for visual disturbance.   Respiratory: Negative for cough and shortness of breath.    Cardiovascular: Negative for chest pain and leg swelling.   Gastrointestinal: Negative for abdominal pain, diarrhea, nausea and vomiting.   Genitourinary: Negative for difficulty urinating.   Musculoskeletal: Positive for arthralgias (left shoulder), myalgias (Left upper arm) and neck pain.   Skin: Negative for wound. "   Neurological: Negative for dizziness, weakness, numbness and headaches.   All other systems reviewed and are negative.      Past Medical History:   Diagnosis Date   • Diverticulitis of colon        No Known Allergies    Past Surgical History:   Procedure Laterality Date   • BLADDER SURGERY     • CATARACT EXTRACTION, BILATERAL     • JOINT REPLACEMENT         Family History   Problem Relation Age of Onset   • Heart disease Mother    • Heart disease Father    • Stroke Father        Social History     Socioeconomic History   • Marital status:      Spouse name: Not on file   • Number of children: Not on file   • Years of education: Not on file   • Highest education level: Not on file   Tobacco Use   • Smoking status: Former Smoker     Quit date:      Years since quittin.4   • Smokeless tobacco: Never Used   Substance and Sexual Activity   • Alcohol use: No   • Drug use: No   • Sexual activity: Defer           Objective   Physical Exam  Vitals reviewed.   Constitutional:       General: She is not in acute distress.     Appearance: She is normal weight.   HENT:      Head: Atraumatic.   Eyes:      Extraocular Movements: Extraocular movements intact.      Conjunctiva/sclera: Conjunctivae normal.   Cardiovascular:      Rate and Rhythm: Normal rate.      Pulses: Normal pulses.   Pulmonary:      Effort: Pulmonary effort is normal. No respiratory distress.   Musculoskeletal:      Left shoulder: Tenderness present. No swelling. Normal range of motion. Normal strength. Normal pulse.      Left elbow: No swelling. Normal range of motion.      Cervical back: Normal range of motion and neck supple. No swelling, erythema or tenderness.      Thoracic back: No tenderness. Normal range of motion.      Lumbar back: No tenderness. Normal range of motion.   Neurological:      General: No focal deficit present.      Mental Status: She is alert.      Cranial Nerves: Cranial nerves are intact.      Sensory: Sensation is intact.       Motor: Motor function is intact.   Psychiatric:         Mood and Affect: Mood is anxious.         Speech: Speech normal.         Behavior: Behavior normal.         Procedures           ED Course      Re-examined patient several times in ED and updated patient /  on test results. Labs without emergent findings and doppler WNL. Pt reported a normal left shoulder xray outpatient. Neurovascularly intact. Discussed close f/u with PCP, Ortho and PT. Discussed possible MRI imaging of left shoulder outpatient. Discussed new or worse sx to return to ED. Will prescribe a short course of Flexeril and Medrol dose pack for symptom relief.     Recent Results (from the past 24 hour(s))   Comprehensive Metabolic Panel    Collection Time: 06/22/21  1:45 PM    Specimen: Blood   Result Value Ref Range    Glucose 156 (H) 65 - 99 mg/dL    BUN 8 8 - 23 mg/dL    Creatinine 0.69 0.57 - 1.00 mg/dL    Sodium 137 136 - 145 mmol/L    Potassium 4.0 3.5 - 5.2 mmol/L    Chloride 102 98 - 107 mmol/L    CO2 26.0 22.0 - 29.0 mmol/L    Calcium 9.7 8.6 - 10.5 mg/dL    Total Protein 6.5 6.0 - 8.5 g/dL    Albumin 3.90 3.50 - 5.20 g/dL    ALT (SGPT) 16 1 - 33 U/L    AST (SGOT) 19 1 - 32 U/L    Alkaline Phosphatase 101 39 - 117 U/L    Total Bilirubin 0.2 0.0 - 1.2 mg/dL    eGFR Non African Amer 84 >60 mL/min/1.73    Globulin 2.6 gm/dL    A/G Ratio 1.5 g/dL    BUN/Creatinine Ratio 11.6 7.0 - 25.0    Anion Gap 9.0 5.0 - 15.0 mmol/L   Green Top (Gel)    Collection Time: 06/22/21  1:45 PM   Result Value Ref Range    Extra Tube Hold for add-ons.    Lavender Top    Collection Time: 06/22/21  1:45 PM   Result Value Ref Range    Extra Tube hold for add-on    Gold Top - SST    Collection Time: 06/22/21  1:45 PM   Result Value Ref Range    Extra Tube Hold for add-ons.    CBC Auto Differential    Collection Time: 06/22/21  1:45 PM    Specimen: Blood   Result Value Ref Range    WBC 8.95 3.40 - 10.80 10*3/mm3    RBC 4.12 3.77 - 5.28 10*6/mm3    Hemoglobin  12.1 12.0 - 15.9 g/dL    Hematocrit 37.8 34.0 - 46.6 %    MCV 91.7 79.0 - 97.0 fL    MCH 29.4 26.6 - 33.0 pg    MCHC 32.0 31.5 - 35.7 g/dL    RDW 12.6 12.3 - 15.4 %    RDW-SD 41.4 37.0 - 54.0 fl    MPV 9.4 6.0 - 12.0 fL    Platelets 347 140 - 450 10*3/mm3    Neutrophil % 58.5 42.7 - 76.0 %    Lymphocyte % 29.4 19.6 - 45.3 %    Monocyte % 7.4 5.0 - 12.0 %    Eosinophil % 3.4 0.3 - 6.2 %    Basophil % 1.0 0.0 - 1.5 %    Immature Grans % 0.3 0.0 - 0.5 %    Neutrophils, Absolute 5.24 1.70 - 7.00 10*3/mm3    Lymphocytes, Absolute 2.63 0.70 - 3.10 10*3/mm3    Monocytes, Absolute 0.66 0.10 - 0.90 10*3/mm3    Eosinophils, Absolute 0.30 0.00 - 0.40 10*3/mm3    Basophils, Absolute 0.09 0.00 - 0.20 10*3/mm3    Immature Grans, Absolute 0.03 0.00 - 0.05 10*3/mm3    nRBC 0.0 0.0 - 0.2 /100 WBC   Duplex Venous Upper Extremity - Left    Collection Time: 06/22/21  3:50 PM   Result Value Ref Range    Target HR (85%) 128 bpm    Max. Pred. HR (100%) 150 bpm    Left Internal Jugular Augment Y     Left Internal Jugular Competent Y     Left Internal Jugular Compress C     Left Internal Jugular Phasic Y     Left Internal Jugular Spont Y     Right Subclavian Augment Y     Right Subclavian Competent Y     Right Subclavian Compress C     Right Subclavian Phasic Y     Right Subclavian Spont Y     Left Subclavian Augment Y     Left Subclavian Competent Y     Left Subclavian Compress C     Left Subclavian Phasic Y     Left Subclavian Spont Y     Left Axillary Augment Y     Left Axillary Competent Y     Left Axillary Compress C     Left Axillary Phasic Y     Left Axillary Spont Y     Left Brachial Augment Y     Left Brachial Compress C     Left Radial Augment Y     Left Radial Compress C     Left Ulnar Augment Y     Left Ulnar Compress C     Left Basilic Upper Compress C     Left Basilic Forearm Compress C     Left Cephalic Upper Compress C     Left Cephalic Forearm Compress C      Note: In addition to lab results from this visit, the labs listed  "above may include labs taken at another facility or during a different encounter within the last 24 hours. Please correlate lab times with ED admission and discharge times for further clarification of the services performed during this visit.    No orders to display     Vitals:    06/22/21 1333 06/22/21 1415 06/22/21 1430 06/22/21 1500   BP: 139/73 134/73 121/65 118/91   BP Location: Right arm      Patient Position: Sitting      Pulse: 79 84  74   Resp: 18      Temp: 98.7 °F (37.1 °C)      TempSrc: Temporal      SpO2: 98% 97% 97% 98%   Weight: 71.2 kg (157 lb)      Height: 162.6 cm (64\")        Medications   sodium chloride 0.9 % flush 10 mL (has no administration in time range)   predniSONE (DELTASONE) tablet 60 mg (60 mg Oral Given 6/22/21 1506)   cyclobenzaprine (FLEXERIL) tablet 10 mg (10 mg Oral Given 6/22/21 1506)     ECG/EMG Results (last 24 hours)     Procedure Component Value Units Date/Time    ECG 12 Lead [[id number]] Collected: 06/22/21 1351     Updated: 06/22/21 1447        ECG 12 Lead                                                  MDM    Final diagnoses:   Left upper arm pain   Muscle cramps       ED Disposition  ED Disposition     ED Disposition Condition Comment    Discharge Stable           Charlie Dominguez MD  [address]  [address]  [place] [id number] [phone]    Schedule an appointment as soon as possible for a visit       Nadir Hendrix MD  [facility] [place] [address]  [phone]    Schedule an appointment as soon as possible for a visit       [facility]  [address] [facility] [address] [phone]    If symptoms worsen         Medication List      New Prescriptions    cyclobenzaprine 10 MG tablet  Commonly known as: FLEXERIL  Take 1 tablet by mouth 3 (Three) Times a Day As Needed for Muscle Spasms for up to 15 doses.     methylPREDNISolone 4 MG dose pack  Commonly known as: MEDROL  Take as directed " on package instructions.           Where to Get Your Medications      These medications were sent to [facility] [address] [place] [address] - [address] - [phone]  - [phone]   [address] [place] [address]    Phone: [phone]   · cyclobenzaprine 10 MG tablet  · methylPREDNISolone 4 MG dose pack          Amelia Beckwith PA  06/22/21 7315

## 2021-06-23 LAB
QT INTERVAL: 362 MS
QTC INTERVAL: 422 MS

## 2021-09-18 ENCOUNTER — HOSPITAL ENCOUNTER (EMERGENCY)
Facility: HOSPITAL | Age: 71
Discharge: HOME | End: 2021-09-18
Attending: EMERGENCY MEDICINE | Admitting: EMERGENCY MEDICINE

## 2021-09-18 ENCOUNTER — APPOINTMENT (OUTPATIENT)
Dept: GENERAL RADIOLOGY | Facility: HOSPITAL | Age: 71
End: 2021-09-18

## 2021-09-18 VITALS
OXYGEN SATURATION: 87 % | SYSTOLIC BLOOD PRESSURE: 123 MMHG | HEIGHT: 64 IN | HEART RATE: 101 BPM | WEIGHT: 151 LBS | BODY MASS INDEX: 25.78 KG/M2 | RESPIRATION RATE: 16 BRPM | TEMPERATURE: 98 F | DIASTOLIC BLOOD PRESSURE: 79 MMHG

## 2021-09-18 DIAGNOSIS — J18.9 PNEUMONITIS: ICD-10-CM

## 2021-09-18 DIAGNOSIS — R07.89 CHEST WALL PAIN: ICD-10-CM
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
## 2021-09-18 LAB
ALBUMIN SERPL-MCNC: 4.1 G/DL (ref 3.5–5.2)
ALBUMIN/GLOB SERPL: 1.4 G/DL
ALP SERPL-CCNC: 109 U/L (ref 39–117)
ALT SERPL W P-5'-P-CCNC: 15 U/L (ref 1–33)
ANION GAP SERPL CALCULATED.3IONS-SCNC: 12 MMOL/L (ref 5–15)
AST SERPL-CCNC: 18 U/L (ref 1–32)
BASOPHILS # BLD AUTO: 0.09 10*3/MM3 (ref 0–0.2)
BASOPHILS NFR BLD AUTO: 0.8 % (ref 0–1.5)
BILIRUB SERPL-MCNC: 0.4 MG/DL (ref 0–1.2)
BUN SERPL-MCNC: 9 MG/DL (ref 8–23)
BUN/CREAT SERPL: 12.3 (ref 7–25)
CALCIUM SPEC-SCNC: 9.6 MG/DL (ref 8.6–10.5)
CHLORIDE SERPL-SCNC: 95 MMOL/L (ref 98–107)
CO2 SERPL-SCNC: 25 MMOL/L (ref 22–29)
CREAT SERPL-MCNC: 0.73 MG/DL (ref 0.57–1)
DEPRECATED RDW RBC AUTO: 44.7 FL (ref 37–54)
EOSINOPHIL # BLD AUTO: 0.4 10*3/MM3 (ref 0–0.4)
EOSINOPHIL NFR BLD AUTO: 3.7 % (ref 0.3–6.2)
ERYTHROCYTE [DISTWIDTH] IN BLOOD BY AUTOMATED COUNT: 13.3 % (ref 12.3–15.4)
GFR SERPL CREATININE-BSD FRML MDRD: 79 ML/MIN/1.73
GLOBULIN UR ELPH-MCNC: 2.9 GM/DL
GLUCOSE SERPL-MCNC: 91 MG/DL (ref 65–99)
HCT VFR BLD AUTO: 42.3 % (ref 34–46.6)
HGB BLD-MCNC: 13.6 G/DL (ref 12–15.9)
IMM GRANULOCYTES # BLD AUTO: 0.06 10*3/MM3 (ref 0–0.05)
IMM GRANULOCYTES NFR BLD AUTO: 0.6 % (ref 0–0.5)
INR PPP: 1.09 (ref 0.85–1.16)
LYMPHOCYTES # BLD AUTO: 2.65 10*3/MM3 (ref 0.7–3.1)
LYMPHOCYTES NFR BLD AUTO: 24.6 % (ref 19.6–45.3)
MCH RBC QN AUTO: 29.3 PG (ref 26.6–33)
MCHC RBC AUTO-ENTMCNC: 32.2 G/DL (ref 31.5–35.7)
MCV RBC AUTO: 91.2 FL (ref 79–97)
MONOCYTES # BLD AUTO: 0.96 10*3/MM3 (ref 0.1–0.9)
MONOCYTES NFR BLD AUTO: 8.9 % (ref 5–12)
NEUTROPHILS NFR BLD AUTO: 6.6 10*3/MM3 (ref 1.7–7)
NEUTROPHILS NFR BLD AUTO: 61.4 % (ref 42.7–76)
NRBC BLD AUTO-RTO: 0 /100 WBC (ref 0–0.2)
NT-PROBNP SERPL-MCNC: 137.1 PG/ML (ref 0–900)
PLATELET # BLD AUTO: 365 10*3/MM3 (ref 140–450)
PMV BLD AUTO: 9.4 FL (ref 6–12)
POTASSIUM SERPL-SCNC: 3.9 MMOL/L (ref 3.5–5.2)
PROT SERPL-MCNC: 7 G/DL (ref 6–8.5)
PROTHROMBIN TIME: 13.8 SECONDS (ref 11.4–14.4)
RBC # BLD AUTO: 4.64 10*6/MM3 (ref 3.77–5.28)
SARS-COV-2 RNA RESP QL NAA+PROBE: NOT DETECTED
SODIUM SERPL-SCNC: 132 MMOL/L (ref 136–145)
TROPONIN T SERPL-MCNC: <0.01 NG/ML (ref 0–0.03)
WBC # BLD AUTO: 10.76 10*3/MM3 (ref 3.4–10.8)

## 2021-09-18 PROCEDURE — 85025 COMPLETE CBC W/AUTO DIFF WBC: CPT | Performed by: PHYSICIAN ASSISTANT

## 2021-09-18 PROCEDURE — 80053 COMPREHEN METABOLIC PANEL: CPT | Performed by: PHYSICIAN ASSISTANT

## 2021-09-18 PROCEDURE — 71046 X-RAY EXAM CHEST 2 VIEWS: CPT

## 2021-09-18 PROCEDURE — 99284 EMERGENCY DEPT VISIT MOD MDM: CPT

## 2021-09-18 PROCEDURE — 83880 ASSAY OF NATRIURETIC PEPTIDE: CPT | Performed by: PHYSICIAN ASSISTANT

## 2021-09-18 PROCEDURE — 85610 PROTHROMBIN TIME: CPT | Performed by: PHYSICIAN ASSISTANT

## 2021-09-18 PROCEDURE — 84484 ASSAY OF TROPONIN QUANT: CPT | Performed by: PHYSICIAN ASSISTANT

## 2021-09-18 PROCEDURE — U0003 INFECTIOUS AGENT DETECTION BY NUCLEIC ACID (DNA OR RNA); SEVERE ACUTE RESPIRATORY SYNDROME CORONAVIRUS 2 (SARS-COV-2) (CORONAVIRUS DISEASE [COVID-19]), AMPLIFIED PROBE TECHNIQUE, MAKING USE OF HIGH THROUGHPUT TECHNOLOGIES AS DESCRIBED BY CMS-2020-01-R: HCPCS | Performed by: PHYSICIAN ASSISTANT

## 2021-09-18 PROCEDURE — 99283 EMERGENCY DEPT VISIT LOW MDM: CPT

## 2021-09-18 PROCEDURE — U0005 INFEC AGEN DETEC AMPLI PROBE: HCPCS | Performed by: PHYSICIAN ASSISTANT

## 2021-09-18 PROCEDURE — 93005 ELECTROCARDIOGRAM TRACING: CPT | Performed by: PHYSICIAN ASSISTANT

## 2021-09-18 RX ORDER — AZITHROMYCIN 250 MG/1
TABLET, FILM COATED ORAL
Qty: 6 TABLET | Refills: 0 | Status: SHIPPED | OUTPATIENT
Start: 2021-09-18 | End: 2022-03-16

## 2021-09-18 NOTE — ED PROVIDER NOTES
Subjective   70-year-old female had a fall and injured her right ribs about 4 days ago.  She said the pain in the right rib states now she has a cough.  She reports she had no fevers no chills.  She is vaccinated states that she has had no prior diagnosis of COVID-19.  She reports she had no loss of smell or taste.  She had no productive cough.  She has no prior history of asthma emphysema or COPD.  Movement increases the pain of the right chest wall.  States her cough is loose and rattling.  Has had no known contact with COVID-19.      History provided by:  Patient   used: No    Fall  Mechanism of injury: fall    Injury location: Right chest wall.  Incident location:  Home  Time since incident:  4 days  Arrived directly from scene: no    Fall:     Fall occurred:  Standing  Associated symptoms: no abdominal pain, no back pain, no blindness, no chest pain, no difficulty breathing, no hearing loss and no loss of consciousness    Risk factors: no anticoagulation therapy and no beta blocker therapy        Review of Systems   Constitutional: Negative for chills and fever.   HENT: Negative for hearing loss.    Eyes: Negative for blindness.   Respiratory: Negative for chest tightness, shortness of breath and wheezing.    Cardiovascular: Negative for chest pain.   Gastrointestinal: Negative for abdominal pain.   Genitourinary: Negative for dysuria, frequency and urgency.   Musculoskeletal: Negative for back pain.   Neurological: Negative for loss of consciousness.   Hematological: Negative for adenopathy.   Psychiatric/Behavioral: Negative.    All other systems reviewed and are negative.      Past Medical History:   Diagnosis Date   • Diverticulitis of colon        No Known Allergies    Past Surgical History:   Procedure Laterality Date   • BLADDER SURGERY     • CATARACT EXTRACTION, BILATERAL     • JOINT REPLACEMENT         Family History   Problem Relation Age of Onset   • Heart disease Mother    •  Heart disease Father    • Stroke Father        Social History     Socioeconomic History   • Marital status:      Spouse name: Not on file   • Number of children: Not on file   • Years of education: Not on file   • Highest education level: Not on file   Tobacco Use   • Smoking status: Former Smoker     Quit date:      Years since quittin.7   • Smokeless tobacco: Never Used   Substance and Sexual Activity   • Alcohol use: No   • Drug use: No   • Sexual activity: Defer           Objective   Physical Exam  Vitals and nursing note reviewed.   Constitutional:       Appearance: She is well-developed.      Comments: Loose productive cough noted.   HENT:      Head: Normocephalic and atraumatic.      Right Ear: External ear normal.      Left Ear: External ear normal.      Nose: Nose normal.   Eyes:      General: No scleral icterus.     Conjunctiva/sclera: Conjunctivae normal.      Pupils: Pupils are equal, round, and reactive to light.   Neck:      Thyroid: No thyromegaly.   Cardiovascular:      Rate and Rhythm: Normal rate and regular rhythm.      Heart sounds: Normal heart sounds.   Pulmonary:      Effort: Pulmonary effort is normal. No respiratory distress.      Breath sounds: Normal breath sounds. No wheezing or rales.      Comments: Tenderness of the right chest wall no crepitus.  No rash or lesions.  Chest:      Chest wall: No tenderness.   Abdominal:      General: Bowel sounds are normal. There is no distension.      Palpations: Abdomen is soft.      Tenderness: There is no abdominal tenderness.   Musculoskeletal:         General: Normal range of motion.      Cervical back: Normal range of motion.   Lymphadenopathy:      Cervical: No cervical adenopathy.   Skin:     General: Skin is warm and dry.   Neurological:      Mental Status: She is alert and oriented to person, place, and time.      Cranial Nerves: No cranial nerve deficit.      Coordination: Coordination normal.      Deep Tendon Reflexes:  Reflexes are normal and symmetric. Reflexes normal.   Psychiatric:         Behavior: Behavior normal.         Thought Content: Thought content normal.         Judgment: Judgment normal.         Procedures           ED Course                                   Recent Results (from the past 24 hour(s))   Comprehensive Metabolic Panel    Collection Time: 09/18/21  1:05 PM    Specimen: Blood   Result Value Ref Range    Glucose 91 65 - 99 mg/dL    BUN 9 8 - 23 mg/dL    Creatinine 0.73 0.57 - 1.00 mg/dL    Sodium 132 (L) 136 - 145 mmol/L    Potassium 3.9 3.5 - 5.2 mmol/L    Chloride 95 (L) 98 - 107 mmol/L    CO2 25.0 22.0 - 29.0 mmol/L    Calcium 9.6 8.6 - 10.5 mg/dL    Total Protein 7.0 6.0 - 8.5 g/dL    Albumin 4.10 3.50 - 5.20 g/dL    ALT (SGPT) 15 1 - 33 U/L    AST (SGOT) 18 1 - 32 U/L    Alkaline Phosphatase 109 39 - 117 U/L    Total Bilirubin 0.4 0.0 - 1.2 mg/dL    eGFR Non African Amer 79 >60 mL/min/1.73    Globulin 2.9 gm/dL    A/G Ratio 1.4 g/dL    BUN/Creatinine Ratio 12.3 7.0 - 25.0    Anion Gap 12.0 5.0 - 15.0 mmol/L   BNP    Collection Time: 09/18/21  1:05 PM    Specimen: Blood   Result Value Ref Range    proBNP 137.1 0.0 - 900.0 pg/mL   Troponin    Collection Time: 09/18/21  1:05 PM    Specimen: Blood   Result Value Ref Range    Troponin T <0.010 0.000 - 0.030 ng/mL   Protime-INR    Collection Time: 09/18/21  1:05 PM    Specimen: Blood   Result Value Ref Range    Protime 13.8 11.4 - 14.4 Seconds    INR 1.09 0.85 - 1.16   CBC Auto Differential    Collection Time: 09/18/21  1:05 PM    Specimen: Blood   Result Value Ref Range    WBC 10.76 3.40 - 10.80 10*3/mm3    RBC 4.64 3.77 - 5.28 10*6/mm3    Hemoglobin 13.6 12.0 - 15.9 g/dL    Hematocrit 42.3 34.0 - 46.6 %    MCV 91.2 79.0 - 97.0 fL    MCH 29.3 26.6 - 33.0 pg    MCHC 32.2 31.5 - 35.7 g/dL    RDW 13.3 12.3 - 15.4 %    RDW-SD 44.7 37.0 - 54.0 fl    MPV 9.4 6.0 - 12.0 fL    Platelets 365 140 - 450 10*3/mm3    Neutrophil % 61.4 42.7 - 76.0 %    Lymphocyte % 24.6  19.6 - 45.3 %    Monocyte % 8.9 5.0 - 12.0 %    Eosinophil % 3.7 0.3 - 6.2 %    Basophil % 0.8 0.0 - 1.5 %    Immature Grans % 0.6 (H) 0.0 - 0.5 %    Neutrophils, Absolute 6.60 1.70 - 7.00 10*3/mm3    Lymphocytes, Absolute 2.65 0.70 - 3.10 10*3/mm3    Monocytes, Absolute 0.96 (H) 0.10 - 0.90 10*3/mm3    Eosinophils, Absolute 0.40 0.00 - 0.40 10*3/mm3    Basophils, Absolute 0.09 0.00 - 0.20 10*3/mm3    Immature Grans, Absolute 0.06 (H) 0.00 - 0.05 10*3/mm3    nRBC 0.0 0.0 - 0.2 /100 WBC   COVID-19,CEPHEID/JANNIE,PROSPER IN-HOUSE(OR EMERGENT/ADD-ON),NP SWAB IN TRANSPORT MEDIA 3-4 HR TAT - Swab, Nasopharynx    Collection Time: 09/18/21  1:06 PM    Specimen: Nasopharynx; Swab   Result Value Ref Range    COVID19 Not Detected Not Detected - Ref. Range     Note: In addition to lab results from this visit, the labs listed above may include labs taken at another facility or during a different encounter within the last 24 hours. Please correlate lab times with ED admission and discharge times for further clarification of the services performed during this visit.    XR Chest 2 View   Preliminary Result   1. No evidence of acute chest trauma.   2. mild but new left basilar atelectasis.   3. Mild nonspecific peribronchial thickening as noted.                 Vitals:    09/18/21 1447 09/18/21 1500 09/18/21 1530 09/18/21 1600   BP: 125/61 126/72 112/64 123/79   BP Location:       Patient Position:       Pulse:       Resp:       Temp:       SpO2: 99% 98% 96% (!) 87%   Weight:       Height:         Medications - No data to display  ECG/EMG Results (last 24 hours)     ** No results found for the last 24 hours. **        ECG 12 Lead    (Results Pending)               MDM  Number of Diagnoses or Management Options  Chest wall pain: new and requires workup  Fall, initial encounter: new and requires workup  Pneumonitis: new and requires workup     Amount and/or Complexity of Data Reviewed  Clinical lab tests: reviewed and ordered  Tests in the  radiology section of CPT®: reviewed and ordered  Tests in the medicine section of CPT®: ordered and reviewed  Discuss the patient with other providers: yes    Patient Progress  Patient progress: stable      Final diagnoses:   Fall, initial encounter   Chest wall pain   Pneumonitis       ED Disposition  ED Disposition     ED Disposition Condition Comment    Discharge Stable           Charlie Dominguez MD  [address]  [address]  [place] [id number] [phone]               Medication List      New Prescriptions    azithromycin 250 MG tablet  Commonly known as: Zithromax Z-Demetrius  Take 2 tablets the first day, then 1 tablet daily for 4 days.     diclofenac 50 MG EC tablet  Commonly known as: VOLTAREN  Take 1 tablet by mouth 3 (Three) Times a Day.           Where to Get Your Medications      These medications were sent to [facility] [address] [place] [address] - [address] [place] [address] - [phone]  - [phone] FX  [address] [place], [place] [id number]    Phone: [phone]   · azithromycin 250 MG tablet  · diclofenac 50 MG EC tablet          Chris Cabral PA  09/18/21 2013

## 2021-09-21 LAB
QT INTERVAL: 334 MS
QTC INTERVAL: 419 MS

## 2021-10-29 ENCOUNTER — OFFICE VISIT (OUTPATIENT)
Dept: ORTHOPEDIC SURGERY | Facility: CLINIC | Age: 71
End: 2021-10-29

## 2021-10-29 VITALS
HEART RATE: 78 BPM | BODY MASS INDEX: 25.1 KG/M2 | SYSTOLIC BLOOD PRESSURE: 146 MMHG | WEIGHT: 147 LBS | HEIGHT: 64 IN | DIASTOLIC BLOOD PRESSURE: 69 MMHG

## 2021-10-29 DIAGNOSIS — M75.52 BURSITIS OF LEFT SHOULDER: ICD-10-CM

## 2021-10-29 DIAGNOSIS — M75.42 IMPINGEMENT SYNDROME OF LEFT SHOULDER: ICD-10-CM

## 2021-10-29 DIAGNOSIS — M25.512 LEFT SHOULDER PAIN, UNSPECIFIED CHRONICITY: ICD-10-CM

## 2021-10-29 DIAGNOSIS — M19.012 PRIMARY LOCALIZED OSTEOARTHROSIS OF LEFT SHOULDER REGION: Primary | ICD-10-CM

## 2021-10-29 PROCEDURE — 99204 OFFICE O/P NEW MOD 45 MIN: CPT | Performed by: ORTHOPAEDIC SURGERY

## 2021-10-29 PROCEDURE — 20610 DRAIN/INJ JOINT/BURSA W/O US: CPT | Performed by: ORTHOPAEDIC SURGERY

## 2021-10-29 RX ORDER — METHYLPREDNISOLONE 4 MG/1
TABLET ORAL
Qty: 1 EACH | Refills: 0 | Status: SHIPPED | OUTPATIENT
Start: 2021-10-29 | End: 2022-03-16

## 2021-10-29 RX ORDER — LIDOCAINE HYDROCHLORIDE 10 MG/ML
5 INJECTION, SOLUTION EPIDURAL; INFILTRATION; INTRACAUDAL; PERINEURAL
Status: COMPLETED | OUTPATIENT
Start: 2021-10-29 | End: 2021-10-29

## 2021-10-29 RX ORDER — METHYLPREDNISOLONE ACETATE 80 MG/ML
80 INJECTION, SUSPENSION INTRA-ARTICULAR; INTRALESIONAL; INTRAMUSCULAR; SOFT TISSUE
Status: COMPLETED | OUTPATIENT
Start: 2021-10-29 | End: 2021-10-29

## 2021-10-29 RX ADMIN — METHYLPREDNISOLONE ACETATE 80 MG: 80 INJECTION, SUSPENSION INTRA-ARTICULAR; INTRALESIONAL; INTRAMUSCULAR; SOFT TISSUE at 10:16

## 2021-10-29 RX ADMIN — LIDOCAINE HYDROCHLORIDE 5 ML: 10 INJECTION, SOLUTION EPIDURAL; INFILTRATION; INTRACAUDAL; PERINEURAL at 10:16

## 2021-10-29 NOTE — PROGRESS NOTES
[facility] [place]opaedic Surgery Office Visit - Milan Seals MD    Office Visit       Patient Name: Racheal De La Vega    Chief Complaint:   Chief Complaint   Patient presents with   • Left Shoulder - Pain       Referring Physician: No ref. provider found    History of Present Illness:   Racheal De La Vega is a 71 y.o. female who presents with left body part: shoulder Reason: pain.  Onset:Onset: atraumatic and gradual in nature. The issue has been ongoing for 5 month(s). Pain is a 8/10 on the pain scale. Pain is described as Pain Characterization: aching. Associated symptoms include Symptoms: pain. The pain is worse with working; pain medication and/or NSAID improve the pain. Previous treatments have included: physical therapy. I have reviewed the patient's history of present illness as noted/entered above.    I have reviewed the patient's past medical history, surgical history, social history, family history, medications, and allergies as noted in the electronic medical record and as noted/entered.  I have reviewed the patient's review of systems as noted/enter and updated as noted in the patient's HPI.      LEFT SHOULDER pain and arthritis  Prior labral surgery -- 3 metallic anchors 1992 Dr. Wu - history of shoulder dislocations  INSTABILITY ARTHROPATHY  Pain after 2nd COVID vaccine    Treated with PT, ER visit  Muscle relaxer helped    Saw Dr. Alen Santacruz  Prior injection - CSI - -helped x1.5 month -- injection in July 2021 -- helped  MRI reportedly and inflammation noted; MRI in June    : Mariano - prior bilateral shoulder evaluation    Chela    Subjective   Subjective      Review of Systems   Constitutional: Negative.  Negative for chills, fatigue and fever.   HENT: Negative.  Negative for congestion and dental problem.    Eyes: Negative.  Negative for blurred vision.   Respiratory: Negative.  Negative for shortness of breath.     Cardiovascular: Negative.  Negative for leg swelling.   Gastrointestinal: Negative.  Negative for abdominal pain.   Endocrine: Negative.  Negative for polyuria.   Genitourinary: Negative.  Negative for difficulty urinating.   Musculoskeletal: Positive for arthralgias.   Skin: Negative.    Allergic/Immunologic: Negative.    Neurological: Negative.    Hematological: Negative.  Negative for adenopathy.   Psychiatric/Behavioral: Negative.  Negative for behavioral problems.        Past Medical History:   Past Medical History:   Diagnosis Date   • Diverticulitis of colon        Past Surgical History:   Past Surgical History:   Procedure Laterality Date   • BLADDER SURGERY     • CATARACT EXTRACTION, BILATERAL     • JOINT REPLACEMENT         Family History:   Family History   Problem Relation Age of Onset   • Heart disease Mother    • Heart disease Father    • Stroke Father        Social History:   Social History     Socioeconomic History   • Marital status:    Tobacco Use   • Smoking status: Former Smoker     Quit date:      Years since quittin.8   • Smokeless tobacco: Never Used   Substance and Sexual Activity   • Alcohol use: No   • Drug use: No   • Sexual activity: Defer       Medications:   Current Outpatient Medications:   •  aspirin 81 MG EC tablet, Take 81 mg by mouth Daily., Disp: , Rfl:   •  atorvastatin (LIPITOR) 40 MG tablet, Take 40 mg by mouth Daily., Disp: , Rfl:   •  azithromycin (Zithromax Z-Demetrius) 250 MG tablet, Take 2 tablets the first day, then 1 tablet daily for 4 days., Disp: 6 tablet, Rfl: 0  •  Calcium Carbonate-Vitamin D (CALTRATE 600+D PO), Take  by mouth., Disp: , Rfl:   •  cholecalciferol (VITAMIN D3) 1000 units tablet, Take 1,000 Units by mouth Daily., Disp: , Rfl:   •  diclofenac (VOLTAREN) 50 MG EC tablet, Take 1 tablet by mouth 3 (Three) Times a Day., Disp: 15 tablet, Rfl: 0  •  estradiol (VIVELLE-DOT) 0.025 MG/24HR patch, APPLY 1 PATCH TOPICALLY TWICE A WEEK AS DIRECTED, Disp:  ", Rfl:   •  methylPREDNISolone (MEDROL) 4 MG dose pack, Use as directed by package instructions, Disp: 1 each, Rfl: 0    Allergies: No Known Allergies    The following portions of the patient's history were reviewed and updated as appropriate: allergies, current medications, past family history, past medical history, past social history, past surgical history and problem list.        Objective    Objective      Vital Signs:   Vitals:    10/29/21 0937   BP: 146/69   Pulse: 78   Weight: 66.7 kg (147 lb)   Height: 162.6 cm (64.02\")       Ortho Exam:  Left shoulder does have stiffness diminished internal rotation findings consistent with glenohumeral joint arthritis but she feels her biggest issue is inflammation and pain at her deltoid insertion.    Good rotator cuff strength, positive impingement testing    Prior deltopectoral approach with a more axillary based incision for prior instability surgery completed in open fashion    Results Review:   Imaging Results (Last 24 Hours)     Procedure Component Value Units Date/Time    XR Shoulder 2+ View Left [[id number]] Resulted: 10/29/21 0956     Updated: 10/29/21 0957    Narrative:      Imaging: shoulder x-rays 2 views - AP and axillary x-ray views    Side: LEFT SHOULDER    Indication for shoulder x-ray 2 views: shoulder pain    Comparison: no comparison views available    Findings: No acute bony pathology. No superior humeral head migration.      Prior labral surgery noted with 3 anchors/metallic noted.  Complete joint   space absence on AP and axillary image appears to be fairly well centered,   paucity of osteophytes on the humeral head despite significant   glenohumeral joint arthritis.      I personally reviewed the above x-rays and discussed with the patient.              Procedures     Left shoulder SHOULDER SUBACROMIAL SPACE INJECTION: Risks and benefits of a shoulder subacromial space injection were discussed and the patient desired to proceed. Verbal consent was " obtained. The patient understood the risk of infection, potential skin changes, bump in blood glucose especially with diabetes, nerve injury, possibility of increased pain in the short term, and possible incomplete pain relief.  Using sterile technique, the shoulder subacromial space was injected from a posterior approach with 1mL of 80mg/mL Depo Medrol and 4cc of lidocaine with aspiration prior to injection. The patient tolerated the procedure without difficulty.  CPT CODE 10407 for major joint aspiration/injection          Assessment / Plan      Assessment/Plan:   Problem List Items Addressed This Visit        Musculoskeletal and Injuries    Primary localized osteoarthrosis of left shoulder region - Primary    Relevant Medications    methylPREDNISolone (MEDROL) 4 MG dose pack    Bursitis of left shoulder    Relevant Medications    methylPREDNISolone (MEDROL) 4 MG dose pack    Impingement syndrome of left shoulder    Relevant Medications    methylPREDNISolone (MEDROL) 4 MG dose pack      Other Visit Diagnoses     Left shoulder pain, unspecified chronicity        Relevant Medications    methylPREDNISolone (MEDROL) 4 MG dose pack    Other Relevant Orders    XR Shoulder 2+ View Left (Completed)        Left shoulder instability arthritis counseled on these findings she also has concerns about inflammatory response following her second Covid vaccination.  I counseled on this she is responded to corticosteroid injection in the past it was more than 3 months ago.  She desires a repeat injection counseled on possible physical therapy but this exacerbated her pain.  Counseled on NSAIDs and Medrol Dosepak.  She will hold on her diclofenac take the Medrol Dosepak for 6 days and then get back on her diclofenac.  We are hopeful that the inflammatory response will subside she obviously has baseline degenerative changes with instability arthropathy but hopefully this will continue to respond to conservative course as well she is  quite stoic with her range of motion despite the known arthritis.    Follow Up: 2 to 3 months as needed possible repeat injection in the future if desired.        Milan Seals MD, FAAOS  Orthopedic Surgeon  Fellowship Trained Shoulder and Elbow Surgeon  [facility]  Orthopedics and Sports Medicine  [address] [place] [address], [address]  [place] [address]    10/29/21  10:43 EDT    Please note that portions of this note may have been completed with a voice recognition program. Efforts were made to edit the dictations, but occasionally words are mistranscribed.

## 2021-10-29 NOTE — PROGRESS NOTES
Procedure   Large Joint Arthrocentesis: L subacromial bursa  Date/Time: 10/29/2021 10:16 AM  Consent given by: patient  Site marked: site marked  Timeout: Immediately prior to procedure a time out was called to verify the correct patient, procedure, equipment, support staff and site/side marked as required   Supporting Documentation  Indications: pain   Procedure Details  Location: shoulder - L subacromial bursa  Preparation: Patient was prepped and draped in the usual sterile fashion  Needle size: 22 G  Approach: posterior  Medications administered: 5 mL lidocaine PF 1% 1 %; 80 mg methylPREDNISolone acetate 80 MG/ML  Patient tolerance: patient tolerated the procedure well with no immediate complications

## 2022-03-16 ENCOUNTER — PRE-ADMISSION TESTING (OUTPATIENT)
Dept: PREADMISSION TESTING | Facility: HOSPITAL | Age: 72
End: 2022-03-16

## 2022-03-16 VITALS — HEIGHT: 64 IN | BODY MASS INDEX: 26.42 KG/M2 | WEIGHT: 154.76 LBS

## 2022-03-16 LAB
ALBUMIN SERPL-MCNC: 4.7 G/DL (ref 3.5–5.2)
ALBUMIN/GLOB SERPL: 2.5 G/DL
ALP SERPL-CCNC: 81 U/L (ref 39–117)
ALT SERPL W P-5'-P-CCNC: 9 U/L (ref 1–33)
ANION GAP SERPL CALCULATED.3IONS-SCNC: 10 MMOL/L (ref 5–15)
APTT PPP: 30.2 SECONDS (ref 22–39)
AST SERPL-CCNC: 15 U/L (ref 1–32)
BASOPHILS # BLD AUTO: 0.07 10*3/MM3 (ref 0–0.2)
BASOPHILS NFR BLD AUTO: 0.9 % (ref 0–1.5)
BILIRUB SERPL-MCNC: 0.5 MG/DL (ref 0–1.2)
BUN SERPL-MCNC: 12 MG/DL (ref 8–23)
BUN/CREAT SERPL: 14.5 (ref 7–25)
CALCIUM SPEC-SCNC: 10.1 MG/DL (ref 8.6–10.5)
CHLORIDE SERPL-SCNC: 102 MMOL/L (ref 98–107)
CO2 SERPL-SCNC: 29 MMOL/L (ref 22–29)
CREAT SERPL-MCNC: 0.83 MG/DL (ref 0.57–1)
DEPRECATED RDW RBC AUTO: 44.2 FL (ref 37–54)
EGFRCR SERPLBLD CKD-EPI 2021: 75.5 ML/MIN/1.73
EOSINOPHIL # BLD AUTO: 0.32 10*3/MM3 (ref 0–0.4)
EOSINOPHIL NFR BLD AUTO: 3.9 % (ref 0.3–6.2)
ERYTHROCYTE [DISTWIDTH] IN BLOOD BY AUTOMATED COUNT: 13.5 % (ref 12.3–15.4)
GLOBULIN UR ELPH-MCNC: 1.9 GM/DL
GLUCOSE SERPL-MCNC: 103 MG/DL (ref 65–99)
HBA1C MFR BLD: 5.4 % (ref 4.8–5.6)
HCT VFR BLD AUTO: 41.5 % (ref 34–46.6)
HGB BLD-MCNC: 13.9 G/DL (ref 12–15.9)
IMM GRANULOCYTES # BLD AUTO: 0.02 10*3/MM3 (ref 0–0.05)
IMM GRANULOCYTES NFR BLD AUTO: 0.2 % (ref 0–0.5)
INR PPP: 1.03 (ref 0.85–1.16)
LYMPHOCYTES # BLD AUTO: 2.53 10*3/MM3 (ref 0.7–3.1)
LYMPHOCYTES NFR BLD AUTO: 31.1 % (ref 19.6–45.3)
MCH RBC QN AUTO: 30.2 PG (ref 26.6–33)
MCHC RBC AUTO-ENTMCNC: 33.5 G/DL (ref 31.5–35.7)
MCV RBC AUTO: 90.2 FL (ref 79–97)
MONOCYTES # BLD AUTO: 0.63 10*3/MM3 (ref 0.1–0.9)
MONOCYTES NFR BLD AUTO: 7.7 % (ref 5–12)
NEUTROPHILS NFR BLD AUTO: 4.57 10*3/MM3 (ref 1.7–7)
NEUTROPHILS NFR BLD AUTO: 56.2 % (ref 42.7–76)
NRBC BLD AUTO-RTO: 0 /100 WBC (ref 0–0.2)
PLATELET # BLD AUTO: 307 10*3/MM3 (ref 140–450)
PMV BLD AUTO: 10 FL (ref 6–12)
POTASSIUM SERPL-SCNC: 4.8 MMOL/L (ref 3.5–5.2)
PROT SERPL-MCNC: 6.6 G/DL (ref 6–8.5)
PROTHROMBIN TIME: 13.2 SECONDS (ref 11.4–14.4)
QT INTERVAL: 360 MS
QTC INTERVAL: 412 MS
RBC # BLD AUTO: 4.6 10*6/MM3 (ref 3.77–5.28)
SARS-COV-2 RNA PNL SPEC NAA+PROBE: NOT DETECTED
SODIUM SERPL-SCNC: 141 MMOL/L (ref 136–145)
WBC NRBC COR # BLD: 8.14 10*3/MM3 (ref 3.4–10.8)

## 2022-03-16 PROCEDURE — U0004 COV-19 TEST NON-CDC HGH THRU: HCPCS

## 2022-03-16 PROCEDURE — 93005 ELECTROCARDIOGRAM TRACING: CPT

## 2022-03-16 PROCEDURE — 93010 ELECTROCARDIOGRAM REPORT: CPT | Performed by: INTERNAL MEDICINE

## 2022-03-16 PROCEDURE — U0005 INFEC AGEN DETEC AMPLI PROBE: HCPCS

## 2022-03-16 PROCEDURE — 85610 PROTHROMBIN TIME: CPT

## 2022-03-16 PROCEDURE — C9803 HOPD COVID-19 SPEC COLLECT: HCPCS

## 2022-03-16 PROCEDURE — 83036 HEMOGLOBIN GLYCOSYLATED A1C: CPT

## 2022-03-16 PROCEDURE — 85730 THROMBOPLASTIN TIME PARTIAL: CPT

## 2022-03-16 PROCEDURE — 36415 COLL VENOUS BLD VENIPUNCTURE: CPT

## 2022-03-16 PROCEDURE — 85025 COMPLETE CBC W/AUTO DIFF WBC: CPT

## 2022-03-16 PROCEDURE — 80053 COMPREHEN METABOLIC PANEL: CPT

## 2022-03-16 RX ORDER — ALUMINUM ZIRCONIUM OCTACHLOROHYDREX GLY 16 G/100G
1 GEL TOPICAL DAILY
COMMUNITY

## 2022-03-16 RX ORDER — GLUCOSAMINE/D3/BOSWELLIA SERRA 1500MG-400
10000 TABLET ORAL DAILY
COMMUNITY

## 2022-03-16 RX ORDER — MAGNESIUM 200 MG
200 TABLET ORAL DAILY
COMMUNITY

## 2022-03-16 RX ORDER — ZINC GLUCONATE 50 MG
50 TABLET ORAL DAILY
COMMUNITY

## 2022-03-16 RX ORDER — ACETAMINOPHEN 500 MG
1000 TABLET ORAL EVERY 6 HOURS PRN
COMMUNITY

## 2022-03-16 NOTE — PAT
An arrival time for procedure was not given during PAT visit. If patient had any questions or concerns about their arrival time, they were instructed to contact their surgeon/physician.  Additionally, if the patient referred to an arrival time that was acquired from their my chart account, patient was encouraged to verify that time with their surgeon/physician.  NO arrival times given in Pre Admission Testing Department.    Patient's surgeon called in a prescription for Benzol Peroxide 5% wash to [facility].  Patient instructed to  from [facility] pharmacy that was submitted electronically.  Verbal and written instructions given regarding proper use of the Benzoyl Peroxide wash given to patient and/or famlily during PAT visit. Patient/family also instructed to complete checklist and return it to Pre-op on the day of surgery.  Patient and/or family verbalized understanding.      Additionally, reinforced with patient to acquire this prescription from the [facility] pharmacy before leaving the hospital after PAT visit due to the potential unavailability at local pharmacies.      Patient instructed to drink 20 ounces (or until full) of Gatorade and it needs to be completed 1 hour (for Main OR patients) or 2 hours (scheduled  section patients) before given arrival time for procedure (NO RED Gatorade)    Patient verbalized understanding.    Patient denied recent urinary frequency, urinary urgency, burning or pain upon urination, or flank pain. No recent UTIs.

## 2022-03-18 ENCOUNTER — HOSPITAL ENCOUNTER (OUTPATIENT)
Facility: HOSPITAL | Age: 72
Discharge: HOME | End: 2022-03-19
Attending: ORTHOPAEDIC SURGERY | Admitting: ORTHOPAEDIC SURGERY

## 2022-03-18 ENCOUNTER — ANESTHESIA EVENT (OUTPATIENT)
Dept: PERIOP | Facility: HOSPITAL | Age: 72
End: 2022-03-18

## 2022-03-18 ENCOUNTER — ANESTHESIA (OUTPATIENT)
Dept: PERIOP | Facility: HOSPITAL | Age: 72
End: 2022-03-18

## 2022-03-18 ENCOUNTER — APPOINTMENT (OUTPATIENT)
Dept: GENERAL RADIOLOGY | Facility: HOSPITAL | Age: 72
End: 2022-03-18

## 2022-03-18 ENCOUNTER — ANESTHESIA EVENT CONVERTED (OUTPATIENT)
Dept: ANESTHESIOLOGY | Facility: HOSPITAL | Age: 72
End: 2022-03-18

## 2022-03-18 DIAGNOSIS — Z96.612 S/P REVERSE TOTAL SHOULDER ARTHROPLASTY, LEFT: Primary | ICD-10-CM

## 2022-03-18 PROBLEM — M25.512 LEFT SHOULDER PAIN: Status: ACTIVE | Noted: 2022-03-18

## 2022-03-18 PROBLEM — M19.012 GLENOHUMERAL ARTHRITIS, LEFT: Status: ACTIVE | Noted: 2022-03-18

## 2022-03-18 PROCEDURE — 73020 X-RAY EXAM OF SHOULDER: CPT

## 2022-03-18 PROCEDURE — 97530 THERAPEUTIC ACTIVITIES: CPT

## 2022-03-18 PROCEDURE — 63710000001 ACETAMINOPHEN 325 MG TABLET: Performed by: ORTHOPAEDIC SURGERY

## 2022-03-18 PROCEDURE — 25010000002 ONDANSETRON PER 1 MG: Performed by: NURSE ANESTHETIST, CERTIFIED REGISTERED

## 2022-03-18 PROCEDURE — 63710000001 DOCUSATE SODIUM 100 MG CAPSULE: Performed by: ORTHOPAEDIC SURGERY

## 2022-03-18 PROCEDURE — 25010000002 PROPOFOL 10 MG/ML EMULSION: Performed by: NURSE ANESTHETIST, CERTIFIED REGISTERED

## 2022-03-18 PROCEDURE — 23472 RECONSTRUCT SHOULDER JOINT: CPT | Performed by: PHYSICIAN ASSISTANT

## 2022-03-18 PROCEDURE — 25010000002 CEFAZOLIN IN DEXTROSE 2-4 GM/100ML-% SOLUTION: Performed by: ORTHOPAEDIC SURGERY

## 2022-03-18 PROCEDURE — A9270 NON-COVERED ITEM OR SERVICE: HCPCS | Performed by: ORTHOPAEDIC SURGERY

## 2022-03-18 PROCEDURE — C1889 IMPLANT/INSERT DEVICE, NOC: HCPCS | Performed by: ORTHOPAEDIC SURGERY

## 2022-03-18 PROCEDURE — 25010000002 DEXAMETHASONE PER 1 MG: Performed by: NURSE ANESTHETIST, CERTIFIED REGISTERED

## 2022-03-18 PROCEDURE — C1776 JOINT DEVICE (IMPLANTABLE): HCPCS | Performed by: ORTHOPAEDIC SURGERY

## 2022-03-18 PROCEDURE — 25010000002 NEOSTIGMINE 10 MG/10ML SOLUTION: Performed by: NURSE ANESTHETIST, CERTIFIED REGISTERED

## 2022-03-18 PROCEDURE — 25010000002 FENTANYL CITRATE (PF) 50 MCG/ML SOLUTION

## 2022-03-18 PROCEDURE — 25010000002 FENTANYL CITRATE (PF) 50 MCG/ML SOLUTION: Performed by: ANESTHESIOLOGY

## 2022-03-18 PROCEDURE — 25010000002 ROPIVACAINE PER 1 MG: Performed by: ORTHOPAEDIC SURGERY

## 2022-03-18 PROCEDURE — G0378 HOSPITAL OBSERVATION PER HR: HCPCS

## 2022-03-18 PROCEDURE — 25010000002 ROPIVACAINE PER 1 MG: Performed by: ANESTHESIOLOGY

## 2022-03-18 PROCEDURE — 25010000002 PHENYLEPHRINE 10 MG/ML SOLUTION 1 ML VIAL: Performed by: NURSE ANESTHETIST, CERTIFIED REGISTERED

## 2022-03-18 PROCEDURE — L3670 SO ACRO/CLAV CAN WEB PRE OTS: HCPCS | Performed by: ORTHOPAEDIC SURGERY

## 2022-03-18 PROCEDURE — 97165 OT EVAL LOW COMPLEX 30 MIN: CPT

## 2022-03-18 PROCEDURE — 25010000002 VANCOMYCIN 1 G RECONSTITUTED SOLUTION: Performed by: ORTHOPAEDIC SURGERY

## 2022-03-18 DEVICE — SCRW AEQUALIS PERFORM PERIPH 5X30MM: Type: IMPLANTABLE DEVICE | Site: SHOULDER | Status: FUNCTIONAL

## 2022-03-18 DEVICE — IMPLANTABLE DEVICE: Type: IMPLANTABLE DEVICE | Site: SHOULDER | Status: FUNCTIONAL

## 2022-03-18 DEVICE — IMPLANTABLE DEVICE
Type: IMPLANTABLE DEVICE | Site: SHOULDER | Status: FUNCTIONAL
Brand: TORNIER PERFORM® REVERSED GLENOID

## 2022-03-18 DEVICE — SCRW AEQUALIS PERFORM PERIPH 5X22MM: Type: IMPLANTABLE DEVICE | Site: SHOULDER | Status: FUNCTIONAL

## 2022-03-18 DEVICE — IMPLANTABLE DEVICE
Type: IMPLANTABLE DEVICE | Site: SHOULDER | Status: FUNCTIONAL
Brand: AEQUALIS™ PERFORM+ REVERSED

## 2022-03-18 DEVICE — IMPLANTABLE DEVICE
Type: IMPLANTABLE DEVICE | Site: SHOULDER | Status: FUNCTIONAL
Brand: TORNIER PERFORM™ HUMERAL SYSTEM

## 2022-03-18 DEVICE — SCRW PERIPH 5X34MM: Type: IMPLANTABLE DEVICE | Site: SHOULDER | Status: FUNCTIONAL

## 2022-03-18 DEVICE — ABSORBABLE HEMOSTAT (OXIDIZED REGENERATED CELLULOSE, U.S.P.)
Type: IMPLANTABLE DEVICE | Site: SHOULDER | Status: FUNCTIONAL
Brand: SURGICEL

## 2022-03-18 DEVICE — SCRW PERIPH AEQUALIS 5X18MM NS: Type: IMPLANTABLE DEVICE | Site: SHOULDER | Status: FUNCTIONAL

## 2022-03-18 RX ORDER — ALBUTEROL SULFATE 2.5 MG/3ML
2.5 SOLUTION RESPIRATORY (INHALATION) ONCE AS NEEDED
Status: DISCONTINUED | OUTPATIENT
Start: 2022-03-18 | End: 2022-03-18 | Stop reason: HOSPADM

## 2022-03-18 RX ORDER — CEFAZOLIN SODIUM 2 G/100ML
2 INJECTION, SOLUTION INTRAVENOUS EVERY 8 HOURS
Status: COMPLETED | OUTPATIENT
Start: 2022-03-18 | End: 2022-03-19

## 2022-03-18 RX ORDER — ACETAMINOPHEN 500 MG
1000 TABLET ORAL ONCE
Status: COMPLETED | OUTPATIENT
Start: 2022-03-18 | End: 2022-03-18

## 2022-03-18 RX ORDER — SODIUM CHLORIDE 450 MG/100ML
50 INJECTION, SOLUTION INTRAVENOUS CONTINUOUS
Status: DISCONTINUED | OUTPATIENT
Start: 2022-03-18 | End: 2022-03-19 | Stop reason: HOSPADM

## 2022-03-18 RX ORDER — GLYCOPYRROLATE 0.2 MG/ML
INJECTION INTRAMUSCULAR; INTRAVENOUS AS NEEDED
Status: DISCONTINUED | OUTPATIENT
Start: 2022-03-18 | End: 2022-03-18 | Stop reason: SURG

## 2022-03-18 RX ORDER — HYDROMORPHONE HYDROCHLORIDE 1 MG/ML
0.5 INJECTION, SOLUTION INTRAMUSCULAR; INTRAVENOUS; SUBCUTANEOUS
Status: DISCONTINUED | OUTPATIENT
Start: 2022-03-18 | End: 2022-03-19 | Stop reason: HOSPADM

## 2022-03-18 RX ORDER — ACETAMINOPHEN 650 MG
TABLET, EXTENDED RELEASE ORAL AS NEEDED
Status: DISCONTINUED | OUTPATIENT
Start: 2022-03-18 | End: 2022-03-18 | Stop reason: HOSPADM

## 2022-03-18 RX ORDER — ROCURONIUM BROMIDE 10 MG/ML
INJECTION, SOLUTION INTRAVENOUS AS NEEDED
Status: DISCONTINUED | OUTPATIENT
Start: 2022-03-18 | End: 2022-03-18 | Stop reason: SURG

## 2022-03-18 RX ORDER — ONDANSETRON 4 MG/1
4 TABLET, FILM COATED ORAL EVERY 6 HOURS PRN
Status: DISCONTINUED | OUTPATIENT
Start: 2022-03-18 | End: 2022-03-19 | Stop reason: HOSPADM

## 2022-03-18 RX ORDER — NEOSTIGMINE METHYLSULFATE 1 MG/ML
INJECTION, SOLUTION INTRAVENOUS AS NEEDED
Status: DISCONTINUED | OUTPATIENT
Start: 2022-03-18 | End: 2022-03-18 | Stop reason: SURG

## 2022-03-18 RX ORDER — FAMOTIDINE 10 MG/ML
20 INJECTION, SOLUTION INTRAVENOUS ONCE
Status: CANCELLED | OUTPATIENT
Start: 2022-03-18 | End: 2022-03-18

## 2022-03-18 RX ORDER — DEXAMETHASONE SODIUM PHOSPHATE 4 MG/ML
INJECTION, SOLUTION INTRA-ARTICULAR; INTRALESIONAL; INTRAMUSCULAR; INTRAVENOUS; SOFT TISSUE AS NEEDED
Status: DISCONTINUED | OUTPATIENT
Start: 2022-03-18 | End: 2022-03-18 | Stop reason: SURG

## 2022-03-18 RX ORDER — TRANEXAMIC ACID 10 MG/ML
1000 INJECTION, SOLUTION INTRAVENOUS ONCE
Status: COMPLETED | OUTPATIENT
Start: 2022-03-18 | End: 2022-03-18

## 2022-03-18 RX ORDER — BUPIVACAINE HCL/0.9 % NACL/PF 0.125 %
PLASTIC BAG, INJECTION (ML) EPIDURAL AS NEEDED
Status: DISCONTINUED | OUTPATIENT
Start: 2022-03-18 | End: 2022-03-18 | Stop reason: SURG

## 2022-03-18 RX ORDER — MAGNESIUM HYDROXIDE 1200 MG/15ML
LIQUID ORAL AS NEEDED
Status: DISCONTINUED | OUTPATIENT
Start: 2022-03-18 | End: 2022-03-18 | Stop reason: HOSPADM

## 2022-03-18 RX ORDER — FENTANYL CITRATE 50 UG/ML
INJECTION, SOLUTION INTRAMUSCULAR; INTRAVENOUS
Status: COMPLETED
Start: 2022-03-18 | End: 2022-03-18

## 2022-03-18 RX ORDER — LIDOCAINE HYDROCHLORIDE 10 MG/ML
INJECTION, SOLUTION EPIDURAL; INFILTRATION; INTRACAUDAL; PERINEURAL AS NEEDED
Status: DISCONTINUED | OUTPATIENT
Start: 2022-03-18 | End: 2022-03-18 | Stop reason: SURG

## 2022-03-18 RX ORDER — PREGABALIN 75 MG/1
75 CAPSULE ORAL ONCE
Status: COMPLETED | OUTPATIENT
Start: 2022-03-18 | End: 2022-03-18

## 2022-03-18 RX ORDER — SODIUM CHLORIDE, SODIUM LACTATE, POTASSIUM CHLORIDE, CALCIUM CHLORIDE 600; 310; 30; 20 MG/100ML; MG/100ML; MG/100ML; MG/100ML
9 INJECTION, SOLUTION INTRAVENOUS CONTINUOUS
Status: DISCONTINUED | OUTPATIENT
Start: 2022-03-18 | End: 2022-03-19 | Stop reason: HOSPADM

## 2022-03-18 RX ORDER — EPHEDRINE SULFATE 50 MG/ML
5 INJECTION INTRAVENOUS
Status: DISCONTINUED | OUTPATIENT
Start: 2022-03-18 | End: 2022-03-18 | Stop reason: HOSPADM

## 2022-03-18 RX ORDER — ONDANSETRON 2 MG/ML
4 INJECTION INTRAMUSCULAR; INTRAVENOUS ONCE AS NEEDED
Status: DISCONTINUED | OUTPATIENT
Start: 2022-03-18 | End: 2022-03-18 | Stop reason: HOSPADM

## 2022-03-18 RX ORDER — FENTANYL CITRATE 50 UG/ML
INJECTION, SOLUTION INTRAMUSCULAR; INTRAVENOUS
Status: COMPLETED | OUTPATIENT
Start: 2022-03-18 | End: 2022-03-18

## 2022-03-18 RX ORDER — HYDROMORPHONE HYDROCHLORIDE 1 MG/ML
0.5 INJECTION, SOLUTION INTRAMUSCULAR; INTRAVENOUS; SUBCUTANEOUS
Status: DISCONTINUED | OUTPATIENT
Start: 2022-03-18 | End: 2022-03-18 | Stop reason: SDUPTHER

## 2022-03-18 RX ORDER — OXYCODONE HYDROCHLORIDE 5 MG/1
10 TABLET ORAL EVERY 4 HOURS PRN
Status: DISCONTINUED | OUTPATIENT
Start: 2022-03-18 | End: 2022-03-19 | Stop reason: HOSPADM

## 2022-03-18 RX ORDER — OXYCODONE HYDROCHLORIDE 5 MG/1
5 TABLET ORAL EVERY 4 HOURS PRN
Status: DISCONTINUED | OUTPATIENT
Start: 2022-03-18 | End: 2022-03-19 | Stop reason: HOSPADM

## 2022-03-18 RX ORDER — MIDAZOLAM HYDROCHLORIDE 1 MG/ML
0.5 INJECTION INTRAMUSCULAR; INTRAVENOUS
Status: DISCONTINUED | OUTPATIENT
Start: 2022-03-18 | End: 2022-03-18 | Stop reason: HOSPADM

## 2022-03-18 RX ORDER — PROPOFOL 10 MG/ML
VIAL (ML) INTRAVENOUS AS NEEDED
Status: DISCONTINUED | OUTPATIENT
Start: 2022-03-18 | End: 2022-03-18 | Stop reason: SURG

## 2022-03-18 RX ORDER — TRANEXAMIC ACID 10 MG/ML
1000 INJECTION, SOLUTION INTRAVENOUS ONCE
Status: DISCONTINUED | OUTPATIENT
Start: 2022-03-18 | End: 2022-03-18 | Stop reason: HOSPADM

## 2022-03-18 RX ORDER — DOCUSATE SODIUM 100 MG/1
100 CAPSULE, LIQUID FILLED ORAL 2 TIMES DAILY PRN
Status: DISCONTINUED | OUTPATIENT
Start: 2022-03-18 | End: 2022-03-19 | Stop reason: HOSPADM

## 2022-03-18 RX ORDER — ACETAMINOPHEN 325 MG/1
650 TABLET ORAL EVERY 4 HOURS PRN
Status: DISCONTINUED | OUTPATIENT
Start: 2022-03-18 | End: 2022-03-19 | Stop reason: HOSPADM

## 2022-03-18 RX ORDER — SODIUM CHLORIDE 0.9 % (FLUSH) 0.9 %
10 SYRINGE (ML) INJECTION AS NEEDED
Status: CANCELLED | OUTPATIENT
Start: 2022-03-18

## 2022-03-18 RX ORDER — NALOXONE HCL 0.4 MG/ML
0.1 VIAL (ML) INJECTION
Status: DISCONTINUED | OUTPATIENT
Start: 2022-03-18 | End: 2022-03-19 | Stop reason: HOSPADM

## 2022-03-18 RX ORDER — VANCOMYCIN HYDROCHLORIDE 1 G/20ML
INJECTION, POWDER, LYOPHILIZED, FOR SOLUTION INTRAVENOUS AS NEEDED
Status: DISCONTINUED | OUTPATIENT
Start: 2022-03-18 | End: 2022-03-18 | Stop reason: HOSPADM

## 2022-03-18 RX ORDER — FAMOTIDINE 20 MG/1
20 TABLET, FILM COATED ORAL ONCE
Status: COMPLETED | OUTPATIENT
Start: 2022-03-18 | End: 2022-03-18

## 2022-03-18 RX ORDER — LIDOCAINE HYDROCHLORIDE 10 MG/ML
0.5 INJECTION, SOLUTION EPIDURAL; INFILTRATION; INTRACAUDAL; PERINEURAL ONCE AS NEEDED
Status: COMPLETED | OUTPATIENT
Start: 2022-03-18 | End: 2022-03-18

## 2022-03-18 RX ORDER — SODIUM CHLORIDE 0.9 % (FLUSH) 0.9 %
10 SYRINGE (ML) INJECTION EVERY 12 HOURS SCHEDULED
Status: CANCELLED | OUTPATIENT
Start: 2022-03-18

## 2022-03-18 RX ORDER — ONDANSETRON 2 MG/ML
INJECTION INTRAMUSCULAR; INTRAVENOUS AS NEEDED
Status: DISCONTINUED | OUTPATIENT
Start: 2022-03-18 | End: 2022-03-18 | Stop reason: SURG

## 2022-03-18 RX ORDER — HYDROMORPHONE HYDROCHLORIDE 1 MG/ML
0.5 INJECTION, SOLUTION INTRAMUSCULAR; INTRAVENOUS; SUBCUTANEOUS
Status: DISCONTINUED | OUTPATIENT
Start: 2022-03-18 | End: 2022-03-18 | Stop reason: HOSPADM

## 2022-03-18 RX ORDER — FENTANYL CITRATE 50 UG/ML
50 INJECTION, SOLUTION INTRAMUSCULAR; INTRAVENOUS
Status: DISCONTINUED | OUTPATIENT
Start: 2022-03-18 | End: 2022-03-18 | Stop reason: HOSPADM

## 2022-03-18 RX ORDER — CEFAZOLIN SODIUM 2 G/100ML
2 INJECTION, SOLUTION INTRAVENOUS ONCE
Status: COMPLETED | OUTPATIENT
Start: 2022-03-18 | End: 2022-03-18

## 2022-03-18 RX ORDER — ONDANSETRON 2 MG/ML
4 INJECTION INTRAMUSCULAR; INTRAVENOUS EVERY 6 HOURS PRN
Status: DISCONTINUED | OUTPATIENT
Start: 2022-03-18 | End: 2022-03-19 | Stop reason: HOSPADM

## 2022-03-18 RX ORDER — FENTANYL CITRATE 50 UG/ML
50 INJECTION, SOLUTION INTRAMUSCULAR; INTRAVENOUS
Status: DISCONTINUED | OUTPATIENT
Start: 2022-03-18 | End: 2022-03-18 | Stop reason: SDUPTHER

## 2022-03-18 RX ORDER — BUPIVACAINE HYDROCHLORIDE 2.5 MG/ML
INJECTION, SOLUTION EPIDURAL; INFILTRATION; INTRACAUDAL
Status: COMPLETED | OUTPATIENT
Start: 2022-03-18 | End: 2022-03-18

## 2022-03-18 RX ORDER — ACETAMINOPHEN 650 MG/1
650 SUPPOSITORY RECTAL EVERY 4 HOURS PRN
Status: DISCONTINUED | OUTPATIENT
Start: 2022-03-18 | End: 2022-03-19 | Stop reason: HOSPADM

## 2022-03-18 RX ORDER — LABETALOL HYDROCHLORIDE 5 MG/ML
10 INJECTION, SOLUTION INTRAVENOUS EVERY 4 HOURS PRN
Status: DISCONTINUED | OUTPATIENT
Start: 2022-03-18 | End: 2022-03-19 | Stop reason: HOSPADM

## 2022-03-18 RX ORDER — EPHEDRINE SULFATE 50 MG/ML
INJECTION INTRAVENOUS
Status: DISPENSED
Start: 2022-03-18 | End: 2022-03-19

## 2022-03-18 RX ADMIN — LIDOCAINE HYDROCHLORIDE 50 MG: 10 INJECTION, SOLUTION EPIDURAL; INFILTRATION; INTRACAUDAL; PERINEURAL at 10:08

## 2022-03-18 RX ADMIN — PROPOFOL 100 MG: 10 INJECTION, EMULSION INTRAVENOUS at 10:08

## 2022-03-18 RX ADMIN — ONDANSETRON 4 MG: 2 INJECTION INTRAMUSCULAR; INTRAVENOUS at 11:42

## 2022-03-18 RX ADMIN — ROPIVACAINE HYDROCHLORIDE 6 ML/HR: 5 INJECTION, SOLUTION EPIDURAL; INFILTRATION; PERINEURAL at 12:04

## 2022-03-18 RX ADMIN — FAMOTIDINE 20 MG: 20 TABLET ORAL at 09:04

## 2022-03-18 RX ADMIN — EPHEDRINE SULFATE 5 MG: 50 INJECTION INTRAVENOUS at 13:29

## 2022-03-18 RX ADMIN — NEOSTIGMINE METHYLSULFATE 2.5 MG: 0.5 INJECTION INTRAVENOUS at 11:58

## 2022-03-18 RX ADMIN — ROCURONIUM BROMIDE 50 MG: 10 INJECTION, SOLUTION INTRAVENOUS at 10:08

## 2022-03-18 RX ADMIN — BUPIVACAINE HYDROCHLORIDE 15 ML: 2.5 INJECTION, SOLUTION EPIDURAL; INFILTRATION; INTRACAUDAL at 09:29

## 2022-03-18 RX ADMIN — Medication 200 MCG: at 10:22

## 2022-03-18 RX ADMIN — TRANEXAMIC ACID 1000 MG: 10 INJECTION, SOLUTION INTRAVENOUS at 10:28

## 2022-03-18 RX ADMIN — SODIUM CHLORIDE 50 ML/HR: 4.5 INJECTION, SOLUTION INTRAVENOUS at 17:45

## 2022-03-18 RX ADMIN — PREGABALIN 75 MG: 75 CAPSULE ORAL at 09:04

## 2022-03-18 RX ADMIN — DOCUSATE SODIUM 100 MG: 100 CAPSULE, LIQUID FILLED ORAL at 19:52

## 2022-03-18 RX ADMIN — ACETAMINOPHEN 650 MG: 325 TABLET ORAL at 19:52

## 2022-03-18 RX ADMIN — CEFAZOLIN SODIUM 2 G: 2 INJECTION, SOLUTION INTRAVENOUS at 10:11

## 2022-03-18 RX ADMIN — SODIUM CHLORIDE, POTASSIUM CHLORIDE, SODIUM LACTATE AND CALCIUM CHLORIDE 9 ML/HR: 600; 310; 30; 20 INJECTION, SOLUTION INTRAVENOUS at 08:45

## 2022-03-18 RX ADMIN — TRANEXAMIC ACID 1000 MG: 10 INJECTION, SOLUTION INTRAVENOUS at 11:41

## 2022-03-18 RX ADMIN — DEXAMETHASONE SODIUM PHOSPHATE 4 MG: 4 INJECTION, SOLUTION INTRA-ARTICULAR; INTRALESIONAL; INTRAMUSCULAR; INTRAVENOUS; SOFT TISSUE at 10:08

## 2022-03-18 RX ADMIN — FENTANYL CITRATE 50 MCG: 50 INJECTION, SOLUTION INTRAMUSCULAR; INTRAVENOUS at 13:20

## 2022-03-18 RX ADMIN — FENTANYL CITRATE 100 MCG: 50 INJECTION, SOLUTION INTRAMUSCULAR; INTRAVENOUS at 09:29

## 2022-03-18 RX ADMIN — PHENYLEPHRINE HYDROCHLORIDE 0.5 MCG/KG/MIN: 10 INJECTION INTRAVENOUS at 10:25

## 2022-03-18 RX ADMIN — CEFAZOLIN SODIUM 2 G: 2 INJECTION, SOLUTION INTRAVENOUS at 17:56

## 2022-03-18 RX ADMIN — LIDOCAINE HYDROCHLORIDE 0.2 ML: 10 INJECTION, SOLUTION EPIDURAL; INFILTRATION; INTRACAUDAL; PERINEURAL at 08:45

## 2022-03-18 RX ADMIN — Medication 200 MCG: at 10:16

## 2022-03-18 RX ADMIN — ACETAMINOPHEN 1000 MG: 500 TABLET ORAL at 09:04

## 2022-03-18 RX ADMIN — FENTANYL CITRATE 50 MCG: 50 INJECTION, SOLUTION INTRAMUSCULAR; INTRAVENOUS at 12:55

## 2022-03-18 RX ADMIN — Medication 200 MCG: at 10:15

## 2022-03-18 RX ADMIN — GLYCOPYRROLATE 0.4 MG: 0.2 INJECTION INTRAMUSCULAR; INTRAVENOUS at 11:58

## 2022-03-18 NOTE — ANESTHESIA PREPROCEDURE EVALUATION
Anesthesia Evaluation     Patient summary reviewed and Nursing notes reviewed   no history of anesthetic complications:  NPO Solid Status: > 8 hours  NPO Liquid Status: > 2 hours           Airway   Mallampati: II  TM distance: >3 FB  Neck ROM: full  No difficulty expected  Dental    (+) upper dentures and partials    Pulmonary - negative pulmonary ROS and normal exam   (-) COPD, asthma, sleep apnea, not a smoker  Cardiovascular - negative cardio ROS and normal exam        Neuro/Psych- negative ROS  (-) seizures, CVA  GI/Hepatic/Renal/Endo - negative ROS     Musculoskeletal     Abdominal    Substance History      OB/GYN          Other   arthritis,                      Anesthesia Plan    ASA 2     general with block     intravenous induction     Anesthetic plan, all risks, benefits, and alternatives have been provided, discussed and informed consent has been obtained with: patient.    Plan discussed with CRNA.        CODE STATUS:

## 2022-03-18 NOTE — ANESTHESIA PROCEDURE NOTES
Airway  Urgency: elective    Date/Time: 3/18/2022 10:11 AM  Airway not difficult    General Information and Staff    Patient location during procedure: OR  CRNA: Dewayne Mcgraw CRNA    Indications and Patient Condition  Indications for airway management: airway protection    Preoxygenated: yes  MILS not maintained throughout  Mask difficulty assessment: 1 - vent by mask    Final Airway Details  Final airway type: endotracheal airway      Successful airway: ETT  Cuffed: yes   Successful intubation technique: direct laryngoscopy  Endotracheal tube insertion site: oral  Blade: Saucedo  Blade size: 2  ETT size (mm): 7.0  Cormack-Lehane Classification: grade I - full view of glottis  Placement verified by: chest auscultation and capnometry   Cuff volume (mL): 5  Measured from: lips  ETT/EBT  to lips (cm): 21  Number of attempts at approach: 1  Assessment: lips, teeth, and gum same as pre-op and atraumatic intubation    Additional Comments  Negative epigastric sounds, Breath sound equal bilaterally with symmetric chest rise and fall

## 2022-03-18 NOTE — ANESTHESIA POSTPROCEDURE EVALUATION
Patient: Racheal De La Vega    Procedure Summary     Date: 03/18/22 Room / Location:  [facility]    Anesthesia Start: 1004 Anesthesia Stop:     Procedure: TOTAL REVERSE SHOULDER ARTHROPLASTY LEFT (Left Shoulder) Diagnosis:       Glenohumeral arthritis, left      (glenohumeral arthropathy, left)    Surgeons: Alen Santacruz MD Provider: Edgardo Vasquez Jr., MD    Anesthesia Type: general with block ASA Status: 2          Anesthesia Type: general with block    Vitals  No vitals data found for the desired time range.          Post Anesthesia Care and Evaluation    Patient location during evaluation: PACU  Patient participation: complete - patient participated  Level of consciousness: awake  Pain score: 0  Pain management: adequate  Airway patency: patent  Anesthetic complications: No anesthetic complications  PONV Status: none  Cardiovascular status: acceptable and stable  Respiratory status: nasal cannula, unassisted, acceptable and spontaneous ventilation  Hydration status: acceptable

## 2022-03-18 NOTE — PLAN OF CARE
PNC in place.  [employer] CDI.  SCDs in place.  Ambulated to BR with good UOP.  Sling with abuduction pillow in place.  OT at bedside.  Needs reinforcement on education for ADLs.  ABX infusing.  Will Continue to monitor

## 2022-03-18 NOTE — THERAPY EVALUATION
Patient Name: Racheal De La Vega  : 1950    MRN: [id number]                              Today's Date: 3/18/2022       Admit Date: 3/18/2022    Visit Dx: No diagnosis found.  Patient Active Problem List   Diagnosis   • Primary localized osteoarthrosis of left shoulder region   • Bursitis of left shoulder   • Impingement syndrome of left shoulder   • Glenohumeral arthritis, left   • Left shoulder pain   • S/P reverse total shoulder arthroplasty, left     Past Medical History:   Diagnosis Date   • Colon polyp    • Diverticulosis    • Headache      Past Surgical History:   Procedure Laterality Date   • BLADDER SURGERY      PROLAPSED BLADDER   • CATARACT EXTRACTION, BILATERAL     • COLONOSCOPY     • JOINT REPLACEMENT Left     SHOULDER   • RETINAL DETACHMENT SURGERY Bilateral    • TOE SURGERY Left     FIRST TOE   • TOTAL KNEE ARTHROPLASTY Left       General Information     Row Name 22          OT Time and Intention    Document Type evaluation;therapy note (daily note)  -TB     Mode of Treatment occupational therapy;individual therapy  -TB     Row Name 22          General Information    Patient Profile Reviewed yes  -TB     Prior Level of Function independent:;all household mobility;community mobility;min assist:;ADL's  -TB     Existing Precautions/Restrictions fall;left;shoulder;non-weight bearing;other (see comments)  s/p L rTSA, NWB precautions, sling with abduction pillow, nerve block  -TB     Barriers to Rehab previous functional deficit  -TB     Row Name 22          Occupational Profile    Reason for Services/Referral (Occupational Profile) Occupational decline  -TB     Environmental Supports and Barriers (Occupational Profile) Supportive spouse. Single story home with 1 step to enter. Tub/shower. Comfort ht commodes. No AD at baseline. No history of falls.  -TB     Patient Goals (Occupational Profile) to return home when medically ready  -TB     Row Name 22 [id number]           Living Environment    People in Home spouse  -TB     Row Name 03/18/22 1750          Home Main Entrance    Number of Stairs, Main Entrance one  -TB     Stair Railings, Main Entrance none  -TB     Row Name 03/18/22 1750          Stairs Within Home, Primary    Number of Stairs, Within Home, Primary none  -TB     Row Name 03/18/22 1750          Cognition    Orientation Status (Cognition) oriented x 3;other (see comments)  Easily distracted/requires frequent redirection for safety.  -TB     Row Name 03/18/22 1750          Safety Issues, Functional Mobility    Safety Issues Affecting Function (Mobility) insight into deficits/self-awareness;awareness of need for assistance;impulsivity;safety precaution awareness;safety precautions follow-through/compliance;sequencing abilities;judgment  -TB     Impairments Affecting Function (Mobility) balance;endurance/activity tolerance;pain;strength;range of motion (ROM)  -TB     Comment, Safety Issues/Impairments (Mobility) Pt up with Min Ax1. Mobility screen deferred until POD#1.  -TB           User Key  (r) = Recorded By, (t) = Taken By, (c) = Cosigned By    Initials Name Provider Type    TB Magaly Méndez OT Occupational Therapist                 Mobility/ADL's     Row Name 03/18/22 1752          Bed Mobility    Bed Mobility supine-sit;scooting/bridging  -TB     Scooting/Bridging [place] (Bed Mobility) standby assist;verbal cues  -TB     Supine-Sit [place] (Bed Mobility) minimum assist (75% patient effort);verbal cues  -TB     Bed Mobility, Safety Issues decreased use of arms for pushing/pulling  -TB     Assistive Device (Bed Mobility) head of bed elevated;bed rails  -TB     Comment, (Bed Mobility) Education initiated for LUE NWB precautions. Cues and assist to sequence transition to EOB sitting.  -TB     Row Name 03/18/22 1752          Transfers    Transfers sit-stand transfer;toilet transfer;bed-chair transfer  -TB     Comment, (Transfers) Education,  cues, and assist for hand placement, sequencing, and safety  -     Bed-Chair [place] (Transfers) minimum assist (75% patient effort);verbal cues  -TB     Sit-Stand [place] (Transfers) minimum assist (75% patient effort);verbal cues  -TB     [place] Level (Toilet Transfer) minimum assist (75% patient effort);verbal cues  -TB     Row Name 03/18/22 1752          Toilet Transfer    Type (Toilet Transfer) sit-stand;stand-sit  -     Row Name 03/18/22 1752          Functional Mobility    Functional Mobility- Ind. Level minimum assist (75% patient effort)  -     Functional Mobility-Distance (Feet) 50  -TB     Functional Mobility-Maintain WBing cues to maintain weight bearing status  -TB     Functional Mobility- Safety Issues balance decreased during turns  -TB     Functional Mobility- Comment Mobility screen deferred until POD#1. Pt does not use AD at baseline and has no history of falls.  -     Row Name 03/18/22 1752          Activities of Daily Living    BADL Assessment/Intervention bathing;upper body dressing;feeding;toileting;grooming  -     Row Name 03/18/22 1752          Mobility    Extremity Weight-bearing Status left upper extremity  -TB     Left Upper Extremity (Weight-bearing Status) non weight-bearing (NWB)   -     Row Name 03/18/22 1752          Bathing Assessment/Intervention    [place] Level (Bathing) upper body;bathing skills;maximum assist (25% patient effort);verbal cues  -TB     Position (Bathing) unsupported sitting  -TB     Comment, (Bathing) Education initiated for LUE precautions, nerve block precautions (no showers until block removed), and axilla care. Spouse present for teaching. Follow up teaching recommended.  -     Row Name 03/18/22 1752          Upper Body Dressing Assessment/Training    [place] Level (Upper Body Dressing) doff;don;maximum assist (25% patient effort);verbal cues  -TB     Position (Upper Body Dressing) unsupported sitting  -TB     Comment,  (Upper Body Dressing) Education initiated for sling mgmt/proper fit/wear schedule. Spouse present for teaching.  -     Row Name 03/18/22 1752          Self-Feeding Assessment/Training    [place] Level (Feeding) set up;prepare tray/open items;independent;scoop food and bring to mouth;liquids to mouth  -TB     Position (Self-Feeding) supported sitting  -TB     Row Name 03/18/22 1752          Toileting Assessment/Training    [place] Level (Toileting) set up;perform perineal hygiene  -     Position (Toileting) unsupported sitting  -TB     Row Name 03/18/22 1752          Grooming Assessment/Training    [place] Level (Grooming) contact guard assist;wash face, hands  -TB     Position (Grooming) sink side  -           User Key  (r) = Recorded By, (t) = Taken By, (c) = Cosigned By    Initials Name Provider Type     Magaly Méndez, OT Occupational Therapist               Obj/Interventions     Row Name 03/18/22 1758          Sensory Assessment (Somatosensory)    Sensory Assessment (Somatosensory) left UE  -TB     Left UE Sensory Assessment light touch awareness;light touch localization;intact  -TB     Sensory Subjective Reports numbness;tingling  Nerve block infusing at 6ml/hr. LUE is numb with good AROM at hand and wrist.  -     Row Name 03/18/22 1758          Vision Assessment/Intervention    Visual Impairment/Limitations WFL  -TB     Row Name 03/18/22 1758          Range of Motion Comprehensive    Comment, General Range of Motion LUE in sling. RUE AROM WFL  -     Row Name 03/18/22 1758          Strength Comprehensive (MMT)    Comment, General Manual Muscle Testing (MMT) Assessment Generalized weakness. LUE deferred.  -     Row Name 03/18/22 1758          Elbow/Forearm (Therapeutic Exercise)    Elbow/Forearm (Therapeutic Exercise) PROM (passive range of motion)  -     Elbow/Forearm PROM (Therapeutic Exercise) left;flexion;extension;supination;pronation;sitting;10 repetitions  -      Row Name 03/18/22 1758          Wrist (Therapeutic Exercise)    Wrist (Therapeutic Exercise) AROM (active range of motion)  -TB     Wrist AROM (Therapeutic Exercise) left;flexion;extension;10 repetitions  -TB     Row Name 03/18/22 1758          Hand (Therapeutic Exercise)    Hand (Therapeutic Exercise) AROM (active range of motion)  -TB     Hand AROM/AAROM (Therapeutic Exercise) left;AROM (active range of motion);finger flexion;finger extension;10 repetitions  -TB     Row Name 03/18/22 1758          Motor Skills    Therapeutic Exercise hand;wrist;elbow/forearm  Education initiated for LUE HEP per MD parameters at hand/wrist/elbow only. Spouse present for teaching. Pt demonstrates good understanding.  -TB     Row Name 03/18/22 1758          Balance    Balance Assessment sitting dynamic balance;sit to stand dynamic balance;standing dynamic balance  -TB     Dynamic Sitting Balance contact guard;verbal cues  -TB     Position, Sitting Balance sitting in chair  -TB     Sit to Stand Dynamic Balance minimal assist;verbal cues  -TB     Dynamic Standing Balance minimal assist;verbal cues  -TB     Balance Interventions sitting;standing;sit to stand;supported;dynamic;occupation based/functional task  -TB     Comment, Balance Min Ax1  -TB           User Key  (r) = Recorded By, (t) = Taken By, (c) = Cosigned By    Initials Name Provider Type    TB Magaly Méndez OT Occupational Therapist               Goals/Plan     Row Name 03/18/22 1807          Transfer Goal 1 (OT)    Activity/Assistive Device (Transfer Goal 1, OT) toilet  -TB     [place] Level/Cues Needed (Transfer Goal 1, OT) standby assist  -TB     Time Frame (Transfer Goal 1, OT) by discharge  -TB     Strategies/Barriers (Transfers Goal 1, OT) L shoulder precautions  -TB     Progress/Outcome (Transfer Goal 1, OT) goal ongoing  -TB     Row Name 03/18/22 1807          Dressing Goal 1 (OT)    Activity/Device (Dressing Goal 1, OT) upper body dressing  -TB      [place]/Cues Needed (Dressing Goal 1, OT) moderate assist (50-74% patient effort);tactile cues required;verbal cues required  -TB     Time Frame (Dressing Goal 1, OT) by discharge  -TB     Strategies/Barriers (Dressing Goal 1, OT) L shoulder precautions  -TB     Progress/Outcome (Dressing Goal 1, OT) goal ongoing  -TB     Row Name 03/18/22 1807          ROM Goal 1 (OT)    ROM Goal 1 (OT) Pt/spouse demonstrate independence with LUE ROM HEP per MD parameters at hand/wrist/and elbow only.  -TB     Time Frame (ROM Goal 1, OT) by discharge  -TB     Strategies/Barriers (ROM Goal 1, OT) L shoulder precautions  -TB     Progress/Outcome (ROM Goal 1, OT) goal ongoing  -TB           User Key  (r) = Recorded By, (t) = Taken By, (c) = Cosigned By    Initials Name Provider Type    TB Magaly Méndez, OT Occupational Therapist               Clinical Impression     Row Name 03/18/22 1804          Pain Assessment    Pretreatment Pain Rating 0/10 - no pain  -TB     Posttreatment Pain Rating 0/10 - no pain  -TB     Pre/Posttreatment Pain Comment Pt denies pain with activity  -TB     Pain Intervention(s) Ambulation/increased activity;Repositioned;Cold applied;Other (Comment)  sling in place. nerve block infusing  -TB     Row Name 03/18/22 1804          Plan of Care Review    Plan of Care Reviewed With patient;spouse  -TB     Outcome Evaluation OT IE completed. Mobility screen deferred until POD#1. Pt up in room/to bathroom with Min Ax1. Education initiated for LUE precautions, sling teaching, and ADL retraining. Max A sling mgmt. LUE HEP completed with AROM hand/wrist and PROM elbow. Spouse present for all teaching. OT will follow IP. Recommend home with spouse assist when medically ready.  -TB     Row Name 03/18/22 1804          Therapy Assessment/Plan (OT)    Rehab Potential (OT) fair, will monitor progress closely  -TB     Criteria for Skilled Therapeutic Interventions Met (OT) yes;skilled treatment is necessary  -TB      Therapy Frequency (OT) daily  -TB     Row Name 03/18/22 1804          Therapy Plan Review/Discharge Plan (OT)    Anticipated Discharge Disposition (OT) home with assist  -TB     Row Name 03/18/22 1804          Vital Signs    Pre Systolic BP Rehab --  RN cleared OT  -TB     Pre SpO2 (%) 95  -TB     O2 Delivery Pre Treatment room air  -TB     Intra SpO2 (%) 94  -TB     O2 Delivery Intra Treatment room air  -TB     Post SpO2 (%) 94  -TB     O2 Delivery Post Treatment room air  -TB     Pre Patient Position Supine  -TB     Intra Patient Position Standing  -TB     Post Patient Position Sitting  -TB     Row Name 03/18/22 1804          Positioning and Restraints    Pre-Treatment Position in bed  -TB     Post Treatment Position chair  -TB     In Chair notified nsg;reclined;call light within reach;encouraged to call for assist;exit alarm on;with brace;legs elevated  -TB           User Key  (r) = Recorded By, (t) = Taken By, (c) = Cosigned By    Initials Name Provider Type    Magaly Burgos OT Occupational Therapist               Outcome Measures     Row Name 03/18/22 1809          How much help from another is currently needed...    Putting on and taking off regular lower body clothing? 2  -TB     Bathing (including washing, rinsing, and drying) 2  -TB     Toileting (which includes using toilet bed pan or urinal) 2  -TB     Putting on and taking off regular upper body clothing 2  -TB     Taking care of personal grooming (such as brushing teeth) 3  -TB     Eating meals 3  -TB     AM-PAC 6 Clicks Score (OT) 14  -TB     Row Name 03/18/22 1809          Functional Assessment    Outcome Measure Options AM-PAC 6 Clicks Daily Activity (OT)  -TB           User Key  (r) = Recorded By, (t) = Taken By, (c) = Cosigned By    Initials Name Provider Type    Magaly Burgos OT Occupational Therapist                Occupational Therapy Education                 Title: PT OT SLP Therapies (In Progress)     Topic:  Occupational Therapy (In Progress)     Point: ADL training (Done)     Description:   Instruct learner(s) on proper safety adaptation and remediation techniques during self care or transfers.   Instruct in proper use of assistive devices.              Learning Progress Summary           Patient Acceptance, E,D, VU,NR by TB at 3/18/2022 1809   Family Acceptance, E,D, VU,NR by TB at 3/18/2022 1809                   Point: Home exercise program (Done)     Description:   Instruct learner(s) on appropriate technique for monitoring, assisting and/or progressing therapeutic exercises/activities.              Learning Progress Summary           Patient Acceptance, E,D, VU,NR by TB at 3/18/2022 1809   Family Acceptance, E,D, VU,NR by TB at 3/18/2022 1809                   Point: Precautions (Done)     Description:   Instruct learner(s) on prescribed precautions during self-care and functional transfers.              Learning Progress Summary           Patient Acceptance, E,D, VU,NR by TB at 3/18/2022 1809   Family Acceptance, E,D, VU,NR by TB at 3/18/2022 1809                   Point: Body mechanics (Not Started)     Description:   Instruct learner(s) on proper positioning and spine alignment during self-care, functional mobility activities and/or exercises.              Learner Progress:  Not documented in this visit.                      User Key     Initials Effective Dates Name Provider Type Discipline     06/16/21 -  Magaly Méndez OT Occupational Therapist OT              OT Recommendation and Plan  Therapy Frequency (OT): daily  Plan of Care Review  Plan of Care Reviewed With: patient, spouse  Outcome Evaluation: OT IE completed. Mobility screen deferred until POD#1. Pt up in room/to bathroom with Min Ax1. Education initiated for LUE precautions, sling teaching, and ADL retraining. Max A sling mgmt. LUE HEP completed with AROM hand/wrist and PROM elbow. Spouse present for all teaching. OT will follow IP.  Recommend home with spouse assist when medically ready.     Time Calculation:    Time Calculation- OT     Row Name 03/18/22 1655             Time Calculation- OT    OT Start Time 1655  -TB      OT Received On 03/18/22  -TB      OT Goal Re-Cert Due Date 03/28/22  -TB              Timed Charges    68714 - OT Therapeutic Activity Minutes 30  -TB              Untimed Charges    OT Eval/Re-eval Minutes 40  -TB              Total Minutes    Timed Charges Total Minutes 30  -TB      Untimed Charges Total Minutes 40  -TB       Total Minutes 70  -TB            User Key  (r) = Recorded By, (t) = Taken By, (c) = Cosigned By    Initials Name Provider Type    TB Magaly Méndez, OT Occupational Therapist              Therapy Charges for Today     Code Description Service Date Service Provider Modifiers Qty    05649766837 HC OT THERAPEUTIC ACT EA 15 MIN 3/18/2022 Magaly Méndez OT GO 2    96849954802 HC OT EVAL LOW COMPLEXITY 3 3/18/2022 Magaly Méndez OT GO 1               Magaly Méndez OT  3/18/2022

## 2022-03-18 NOTE — OP NOTE
Operative Report Reverse Total Shoulder Arthroplasty    DATE OF OPERATION: 03/18/22    PREOPERATIVE DIAGNOSIS: Left shoulder post capsulorrhaphy arthropathy      POSTOPERATIVE DIAGNOSES:  1. same      PROCEDURES PERFORMED:  1. left reverse total shoulder arthroplasty.          SURGEON: Alen Santacruz MD      Assistant: Marcelino Bentley PA  ** Please note the physician assistant was medically necessary to assist with positioning retraction, arm positioning, care of soft tissues and closure.      ANESTHESIA: General plus block.      ESTIMATED BLOOD LOSS:150mL.      COMPLICATIONS: None.      DISPOSITION: Recovery room in stable condition.      IMPLANTS:  Tornier reverse total shoulder system  Humeral Stem: size 1+ short  Humeral Tray: as above  Polyethylene Liner: 36+3, 10 deg medial lip liner  Baseplate: half wedge, augment dialed anterior, 30mm x 9.5mm  Glenosphere: 36 +3 lateral     INDICATIONS: This is a 71-year-old female with left shoulder pain and limited function and motion secondary to above diagnosis. They have failed conservative treatment and after a discussion of risks, benefits, and alternatives, wished to proceed with shoulder arthroplasty.    DESCRIPTION OF PROCEDURE: On the day of surgery, the patient identified the left shoulder as the correct operative extremity. This was initialed by the surgeon with the patients's acknowledgment. The patient underwent placement of an interscalene block and was taken to the operating room and placed in the supine position. Upon induction of adequate anesthesia, the patient was brought up to the beach chair position and the shoulder and upper extremity were prepped and draped in the usual sterile fashion. Timeout confirmed the correct patient and operative extremity as well as that antibiotics were on board. A standard deltopectoral approach to the shoulder was carried out. It was carried sharply through the skin and subcutaneous tissue. Medial and lateral flaps  were developed over the deltopectoral fascia. The cephalic vein was identified and mobilized laterally with the deltoid. The subdeltoid and subpectoral spaces were mobilized and a blunt retractor was placed deep to this. The clavipectoral fascia was opened on the lateral edge of the conjoined tendon and the retractor was moved deep to this. The leading edge of the pectoralis was released exposing the long head of the biceps. This was tenosynovitic and therefore tenotomized. The 3 sisters were identified and coagulated. The subscapularis was partially torn and extremely poor tissue quality. As such a reverse TSA was performed.  A subscapularis tenotomy was performed and rotator interval was released to the glenoid exposing the humeral head. The inferior capsule was released directly off the humerus to allow greater than 90° of external rotation. The anatomic neck was exposed and the humeral head osteotomy was performed in approximately 20° of retroversion. The remainder of the osteophytes were removed. The canal was then entered, reamed, and broached. The final stem impacted in in approximately 20° of retroversion. A head protector was placed. The humerus was subluxed posteriorly. The glenoid exposed. Circumferential labral excision and capsular release were performed. A  mobilization of the subscapularis was carried out as well.  A centering hole was drilled. The glenoid was gently reamed and then the  central hole for the baseplate was drilled  glenoid baseplate inserted.  Screws were then placed through the baseplate  The glenosphere was then inserted and locked into place with a set screw.  The humerus was carefully subluxed back anteriorly. A liner tray and polyethylene were placed and trialing was carried out. The appropriate final sizes were chosen and locked into place.  The shoulder was then reduced.  This allowed nearly full passive range of motion with no instability. The joint was copiously irrigated with  orthopedic irrigation mixed with Betadine after the final implants were assembled and locked into place.     vancomycin powder was placed in the wound      The deltopectoral interval was approximated with 0 Vicryl, the subcutaneous tissue with 2-0 Vicryl, and the skin with nylon. A sterile dressing was placed. Anesthesia was reversed and the patient was taken to the recovery room in stable condition. All instrument, needle, and sponge counts were correct.      Alen Santacruz MD, MS

## 2022-03-18 NOTE — ANESTHESIA PROCEDURE NOTES
Peripheral Block      Patient reassessed immediately prior to procedure    Patient location during procedure: pre-op  Start time: 3/18/2022 9:17 AM  Stop time: 3/18/2022 9:30 AM  Reason for block: at surgeon's request and post-op pain management  Performed by  Anesthesiologist: Edgardo Vasquez Jr., MD  CRNA: Kobe Hernandez, CRNA  Assisted by: Fina August RN  Preanesthetic Checklist  Completed: patient identified, IV checked, site marked, risks and benefits discussed, surgical consent, monitors and equipment checked, pre-op evaluation and timeout performed  Prep:  Pt Position: right lateral decubitus  Sterile barriers:cap, gloves, mask and sterile barriers  Prep: ChloraPrep  Patient monitoring: blood pressure monitoring, continuous pulse oximetry and EKG  Procedure    Sedation: yes  Performed under: local infiltration  Guidance:ultrasound guided  Images:still images obtained, printed/placed on chart    Laterality:left  Block Type:interscalene  Injection Technique:catheter  Needle Type:Tuohy and echogenic  Needle Gauge:18 G  Resistance on Injection: none  Catheter Size:20 G (20g)  Cath Depth at skin: 10 cm    Medications Used: fentaNYL citrate (PF) (SUBLIMAZE) injection, 100 mcg  bupivacaine PF (MARCAINE) 0.25 % injection, 15 mL  Med administered at 3/18/2022 9:29 AM      Medications  Preservative Free Saline:10ml    Post Assessment  Injection Assessment: negative aspiration for heme, no paresthesia on injection and incremental injection  Patient Tolerance:comfortable throughout block  Complications:no  Additional Notes  Procedure:                 The pt was placed in semifowlers position with a slight tilt of the thorax contralateral to the insertion site.  The Insertion Site was prepped and draped in sterile fashion.  The pt was anesthetized with  IV Sedation( see meds) and  Skin and cutaneous tissue was infiltrated and anesthetized with 1% Lidocaine 3 mls via a 25g needle.  Utilizing ultrasound guidance, a  BBraun 4 inch 18 g Contiplex echogenic touhy needle was advanced in-plane.  Hydro dissection of tissue was achieved with Normal saline. Major vessels(carotid and Internal Jugular) where visualized as the brachial plexus was approached at the approximate level of C-7/ T-1.  Cervical 5 and Branches of Cervical 6 nerve roots where visualized and the needle tip was placed posterior at the level of C-6 roots.  LA spread was visualized and injection was made incrementally every 5 mls with aspiration. Injection pressure was normal or little, there was no intraneural injection, no vascular injection.      The BBraun 20 g wire stylet catheter was then placed under US guidance on the posterior aspect of the Brachial Plexus. The tuohy was removed and the location of catheter was confirmed with NS injection visualized with US . The skin was sealed with exofin tissue adhesive at catheter insertion site.  Skin was prepped with benzoin and the catheter was secured with steristrips and a CHG tegaderm. Appropriate labels applied. Thank You.

## 2022-03-18 NOTE — H&P
Patient Care Team:      Chief complaint  Left shoulder pain    Subjective:    Patient is a 71 y.o.female presents with a history of left shoulder pain.  She had remote surgery on her left shoulder for dislocations (1992).  She did very well until after her second Covid vaccine when she began having severe pain.  She had cortisone injections with temporary  Relief.  She presents today for left shoulder replacement    Review of Systems:  General ROS: negative  Cardiovascular ROS: no chest pain or dyspnea on exertion  Respiratory ROS: no cough, shortness of breath, or wheezing      Allergies: No Known Allergies       Latex: neg  Contrast Dye neg    Home Meds    Medications Prior to Admission   Medication Sig Dispense Refill Last Dose   • benzoyl peroxide 5 % external wash use as directed by Dr. Santacruz (Patient taking differently: use as directed by Dr. Santacruz) 142 g 0 3/18/2022 at Unknown time   • acetaminophen (TYLENOL) 500 MG tablet Take 1,000 mg by mouth Every 6 (Six) Hours As Needed for Mild Pain .   3/8/2022   • atorvastatin (LIPITOR) 40 MG tablet Take 40 mg by mouth Daily. PT USUALLY TAKES ONCE WEEKLY   3/8/2022   • Biotin 84845 MCG tablet Take 10,000 mcg by mouth Daily.   3/8/2022   • Cholecalciferol (vitamin D3) 125 MCG (5000 UT) tablet tablet Take 5,000 Units by mouth Daily.   3/8/2022   • COD LIVER OIL PO Take 1 tablet by mouth Daily.   3/8/2022   • Estradiol (ESTRACE VA) Insert 0.5 mg into the vagina 1 (One) Time Per Week.   3/8/2022   • estradiol (VIVELLE-DOT) 0.025 MG/24HR patch Place 1 patch on the skin as directed by provider 2 (Two) Times a Week.   3/8/2022   • Magnesium 200 MG tablet Take 200 mg by mouth Daily.   3/8/2022   • ondansetron (ZOFRAN) 4 MG tablet Take 1 tablet by mouth Every 8 (Eight) Hours As Needed for nausea. 30 tablet 0    • Probiotic Product (Align) capsule Take 1 capsule by mouth Daily.   3/8/2022   • Zinc 50 MG tablet Take 50 mg by mouth Daily.   3/8/2022     PMH:   Past Medical  "History:   Diagnosis Date   • Colon polyp    • Diverticulosis    • Headache      PSH:    Past Surgical History:   Procedure Laterality Date   • BLADDER SURGERY      PROLAPSED BLADDER   • CATARACT EXTRACTION, BILATERAL     • COLONOSCOPY     • JOINT REPLACEMENT Left 1992    SHOULDER   • RETINAL DETACHMENT SURGERY Bilateral    • TOE SURGERY Left     FIRST TOE   • TOTAL KNEE ARTHROPLASTY Left      Immunization History: pneumo neg   Flu  neg  Tetanus  ?  COVID x2  Social History:   Tobacco neg   Alcohol neg      Physical Exam:/64 (BP Location: Right arm, Patient Position: Sitting)   Pulse 80   Temp 96.9 °F (36.1 °C) (Temporal)   Resp 18   Ht 162.6 cm (64\")   Wt 69.9 kg (154 lb)   SpO2 98%   Breastfeeding No   BMI 26.43 kg/m²       General Appearance:    Alert, cooperative, no distress, appears stated age   Head:    Normocephalic, without obvious abnormality, atraumatic   Lungs:     Clear to auscultation bilaterally, respirations unlabored    Heart: Regular rate and rhythm, S1 and S2 normal, no murmur, rub    or gallop    Abdomen:    Soft without tenderness   Breast Exam:    deferred   Genitalia:    deferred   Extremities:   Extremities normal, atraumatic, no cyanosis or edema   Skin:   Skin color, texture, turgor normal, no rashes or lesions   Neurologic:   Grossly intact     Results Review:   LABS:  Lab Results   Component Value Date    WBC 8.14 03/16/2022    HGB 13.9 03/16/2022    HCT 41.5 03/16/2022    MCV 90.2 03/16/2022     03/16/2022    NEUTROABS 4.57 03/16/2022    GLUCOSE 103 (H) 03/16/2022    BUN 12 03/16/2022    CREATININE 0.83 03/16/2022    EGFRIFNONA 79 09/18/2021     03/16/2022    K 4.8 03/16/2022     03/16/2022    CO2 29.0 03/16/2022    CALCIUM 10.1 03/16/2022    ALBUMIN 4.70 03/16/2022    AST 15 03/16/2022    ALT 9 03/16/2022    BILITOT 0.5 03/16/2022       RADIOLOGY:  Imaging Results (Last 72 Hours)     ** No results found for the last 72 hours. **               Cancer " Patient: __ yes __no __unknown; If yes, clinical stage T:__ N:__M:__, stage group    Impression: primary osteoarthritis left shoulder    Plan: left shoulder arthroplasty  Savita Celeste PA-C 3/18/2022 09:00 EDT

## 2022-03-18 NOTE — H&P
Patient Name: Racheal De La Vega  MRN: [id number]  : 1950  DOS: 3/18/2022    Attending: Alen Santacruz MD    Primary Care Provider: Charlie Dominguez MD      Chief complaint: Left shoulder pain    Subjective   Patient is a pleasant 71 y.o. female presented for scheduled surgery by Dr. Santacruz    She underwent left reverse total shoulder arthroplasty under GA and a block, tolerated surgery well, was admitted for further management.    Seen in her room after surgery, doing fairly well.  Had some postoperative pain, no complaints of nausea, vomiting, or shortness of breath.     She was seen by OT earlier, required max assist with sling management.    Allergies:  No Known Allergies      Medications Prior to Admission   Medication Sig Dispense Refill Last Dose   • benzoyl peroxide 5 % external wash use as directed by Dr. Santacruz (Patient taking differently: use as directed by Dr. Santacruz) 142 g 0 3/18/2022 at Unknown time   • acetaminophen (TYLENOL) 500 MG tablet Take 1,000 mg by mouth Every 6 (Six) Hours As Needed for Mild Pain .   3/8/2022   • atorvastatin (LIPITOR) 40 MG tablet Take 40 mg by mouth Daily. PT USUALLY TAKES ONCE WEEKLY   3/8/2022   • Biotin 44711 MCG tablet Take 10,000 mcg by mouth Daily.   3/8/2022   • Cholecalciferol (vitamin D3) 125 MCG (5000 UT) tablet tablet Take 5,000 Units by mouth Daily.   3/8/2022   • COD LIVER OIL PO Take 1 tablet by mouth Daily.   3/8/2022   • Estradiol (ESTRACE VA) Insert 0.5 mg into the vagina 1 (One) Time Per Week.   3/8/2022   • estradiol (VIVELLE-DOT) 0.025 MG/24HR patch Place 1 patch on the skin as directed by provider 2 (Two) Times a Week.   3/8/2022   • Magnesium 200 MG tablet Take 200 mg by mouth Daily.   3/8/2022   • ondansetron (ZOFRAN) 4 MG tablet Take 1 tablet by mouth Every 8 (Eight) Hours As Needed for nausea. 30 tablet 0    • Probiotic Product (Align) capsule Take 1 capsule by mouth Daily.   3/8/2022   • Zinc 50 MG tablet Take 50 mg by mouth  "Daily.   3/8/2022           Past Medical History:   Diagnosis Date   • Colon polyp    • Diverticulosis    • Headache      Past Surgical History:   Procedure Laterality Date   • BLADDER SURGERY      PROLAPSED BLADDER   • CATARACT EXTRACTION, BILATERAL     • COLONOSCOPY     • JOINT REPLACEMENT Left 1992    SHOULDER   • RETINAL DETACHMENT SURGERY Bilateral    • TOE SURGERY Left     FIRST TOE   • TOTAL KNEE ARTHROPLASTY Left      Family History   Problem Relation Age of Onset   • Heart disease Mother    • Heart disease Father    • Stroke Father      Social History     Tobacco Use   • Smoking status: Former Smoker     Packs/day: 0.25     Types: Cigarettes     Quit date:      Years since quittin.2   • Smokeless tobacco: Never Used   Vaping Use   • Vaping Use: Never used   Substance Use Topics   • Alcohol use: No   • Drug use: No   .  Lives with her .    Review of Systems  Pertinent items are noted in HPI, all other systems reviewed and negative    Vital Signs  /55   Pulse 54   Temp 97.7 °F (36.5 °C) (Temporal)   Resp 18   Ht 162.6 cm (64\")   Wt 69.9 kg (154 lb)   SpO2 97%   Breastfeeding No   BMI 26.43 kg/m²     Physical Exam:    General Appearance:    Alert, cooperative, in no acute distress   Head:    Normocephalic, without obvious abnormality, atraumatic   Eyes:            Lids and lashes normal, conjunctivae and sclerae normal, no   icterus, no pallor, corneas clear,    Ears:    Ears appear intact with no abnormalities noted   Throat:   No oral lesions, no thrush, oral mucosa moist   Neck:   No adenopathy, supple, trachea midline, no thyromegaly         Lungs:     Clear to auscultation,respirations regular, even and                   unlabored    Heart:    Regular rhythm and normal rate, normal S1 and S2, no      murmur    Abdomen:     Normal bowel sounds, no masses, no organomegaly, soft        non-tender, non-distended, no guarding, no rebound                 tenderness "   Genitalia:    Deferred   Extremities:  Left UE in a sling, CDI Aquacel dressing shoulder. Interscalene nerve block cath present.  Distal pulses, cap refill, movements of fingers, wrist, intact.     Pulses:   Pulses palpable and equal bilaterally   Skin:   No bleeding, bruising or rash   Neurologic:   Cranial nerves 2 - 12 grossly intact      I reviewed the patient's new clinical results.       Results from last 7 days   Lab Units 03/16/22  0944   WBC 10*3/mm3 8.14   HEMOGLOBIN g/dL 13.9   HEMATOCRIT % 41.5   PLATELETS 10*3/mm3 307     Results from last 7 days   Lab Units 03/16/22  0944   SODIUM mmol/L 141   POTASSIUM mmol/L 4.8   CHLORIDE mmol/L 102   CO2 mmol/L 29.0   BUN mg/dL 12   CREATININE mg/dL 0.83   CALCIUM mg/dL 10.1   BILIRUBIN mg/dL 0.5   ALK PHOS U/L 81   ALT (SGPT) U/L 9   AST (SGOT) U/L 15   GLUCOSE mg/dL 103*     Lab Results   Component Value Date    HGBA1C 5.40 03/16/2022           Assessment and Plan:       S/P reverse total shoulder arthroplasty, left    Glenohumeral arthritis, left    Left shoulder pain      Plan    1. PT/OT. NWB, left UE, ROM hand, wrist, elbow.  2. Pain control-prns, interscalene nerve block cath with ropivacaine infusion.   3. IS-encourage  4. DVT proph- Mech/ mobilize.  5. Bowel regimen  6. Resume home medications as appropriate  7. Monitor post-op labs  8. DC planning for home    Hyperlipidemia: Resume statin at discharge, patient takes a couple times weekly    I reviewed with patient postop management plan, she expressed understanding and agreement.      Dragon disclaimer:  Part of this encounter note is an electronic transcription/translation of spoken language to printed text. The electronic translation of spoken language may permit erroneous, or at times, nonsensical words or phrases to be inadvertently transcribed; Although I have reviewed the note for such errors, some may still exist.    Linda Martin MD  03/18/22  14:45 EDT

## 2022-03-18 NOTE — PLAN OF CARE
Problem: Adult Inpatient Plan of Care  Goal: Plan of Care Review  Recent Flowsheet Documentation  Taken 3/18/2022 1804 by Magaly Méndez OT  Plan of Care Reviewed With:   patient   spouse  Outcome Evaluation: OT IE completed. Mobility screen deferred until POD#1. Pt up in room/to bathroom with Min Ax1. Education initiated for LUE precautions, sling teaching, and ADL retraining. Max A sling mgmt. LUE HEP completed with AROM hand/wrist and PROM elbow. Spouse present for all teaching. OT will follow IP. Recommend home with spouse assist when medically ready.

## 2022-03-19 ENCOUNTER — TRANSCRIBE ORDERS (OUTPATIENT)
Dept: HOME HEALTH SERVICES | Facility: HOME HEALTHCARE | Age: 72
End: 2022-03-19

## 2022-03-19 ENCOUNTER — HOME HEALTH ADMISSION (OUTPATIENT)
Dept: HOME HEALTH SERVICES | Facility: HOME HEALTHCARE | Age: 72
End: 2022-03-19

## 2022-03-19 VITALS
HEIGHT: 64 IN | TEMPERATURE: 98 F | RESPIRATION RATE: 16 BRPM | OXYGEN SATURATION: 95 % | HEART RATE: 83 BPM | DIASTOLIC BLOOD PRESSURE: 77 MMHG | SYSTOLIC BLOOD PRESSURE: 130 MMHG | BODY MASS INDEX: 26.29 KG/M2 | WEIGHT: 154 LBS

## 2022-03-19 DIAGNOSIS — Z47.1 AFTERCARE FOLLOWING LEFT SHOULDER JOINT REPLACEMENT SURGERY: Primary | ICD-10-CM

## 2022-03-19 DIAGNOSIS — Z96.612 AFTERCARE FOLLOWING LEFT SHOULDER JOINT REPLACEMENT SURGERY: Primary | ICD-10-CM

## 2022-03-19 PROBLEM — G89.18 POSTOPERATIVE PAIN: Status: ACTIVE | Noted: 2022-03-19

## 2022-03-19 LAB
ANION GAP SERPL CALCULATED.3IONS-SCNC: 11 MMOL/L (ref 5–15)
BASOPHILS # BLD AUTO: 0.05 10*3/MM3 (ref 0–0.2)
BASOPHILS NFR BLD AUTO: 0.3 % (ref 0–1.5)
BUN SERPL-MCNC: 13 MG/DL (ref 8–23)
BUN/CREAT SERPL: 15.9 (ref 7–25)
CALCIUM SPEC-SCNC: 9.7 MG/DL (ref 8.6–10.5)
CHLORIDE SERPL-SCNC: 104 MMOL/L (ref 98–107)
CO2 SERPL-SCNC: 25 MMOL/L (ref 22–29)
CREAT SERPL-MCNC: 0.82 MG/DL (ref 0.57–1)
DEPRECATED RDW RBC AUTO: 45.1 FL (ref 37–54)
EGFRCR SERPLBLD CKD-EPI 2021: 76.6 ML/MIN/1.73
EOSINOPHIL # BLD AUTO: 0.01 10*3/MM3 (ref 0–0.4)
EOSINOPHIL NFR BLD AUTO: 0.1 % (ref 0.3–6.2)
ERYTHROCYTE [DISTWIDTH] IN BLOOD BY AUTOMATED COUNT: 13.5 % (ref 12.3–15.4)
GLUCOSE SERPL-MCNC: 89 MG/DL (ref 65–99)
HCT VFR BLD AUTO: 37.3 % (ref 34–46.6)
HGB BLD-MCNC: 12.1 G/DL (ref 12–15.9)
IMM GRANULOCYTES # BLD AUTO: 0.09 10*3/MM3 (ref 0–0.05)
IMM GRANULOCYTES NFR BLD AUTO: 0.5 % (ref 0–0.5)
LYMPHOCYTES # BLD AUTO: 2.63 10*3/MM3 (ref 0.7–3.1)
LYMPHOCYTES NFR BLD AUTO: 15.3 % (ref 19.6–45.3)
MCH RBC QN AUTO: 29.7 PG (ref 26.6–33)
MCHC RBC AUTO-ENTMCNC: 32.4 G/DL (ref 31.5–35.7)
MCV RBC AUTO: 91.4 FL (ref 79–97)
MONOCYTES # BLD AUTO: 1.53 10*3/MM3 (ref 0.1–0.9)
MONOCYTES NFR BLD AUTO: 8.9 % (ref 5–12)
NEUTROPHILS NFR BLD AUTO: 12.89 10*3/MM3 (ref 1.7–7)
NEUTROPHILS NFR BLD AUTO: 74.9 % (ref 42.7–76)
NRBC BLD AUTO-RTO: 0 /100 WBC (ref 0–0.2)
PLATELET # BLD AUTO: 256 10*3/MM3 (ref 140–450)
PMV BLD AUTO: 10.6 FL (ref 6–12)
POTASSIUM SERPL-SCNC: 4.3 MMOL/L (ref 3.5–5.2)
RBC # BLD AUTO: 4.08 10*6/MM3 (ref 3.77–5.28)
SODIUM SERPL-SCNC: 140 MMOL/L (ref 136–145)
WBC NRBC COR # BLD: 17.2 10*3/MM3 (ref 3.4–10.8)

## 2022-03-19 PROCEDURE — 97535 SELF CARE MNGMENT TRAINING: CPT

## 2022-03-19 PROCEDURE — A9270 NON-COVERED ITEM OR SERVICE: HCPCS | Performed by: ORTHOPAEDIC SURGERY

## 2022-03-19 PROCEDURE — 97530 THERAPEUTIC ACTIVITIES: CPT

## 2022-03-19 PROCEDURE — 97116 GAIT TRAINING THERAPY: CPT

## 2022-03-19 PROCEDURE — 63710000001 DOCUSATE SODIUM 100 MG CAPSULE: Performed by: ORTHOPAEDIC SURGERY

## 2022-03-19 PROCEDURE — 80048 BASIC METABOLIC PNL TOTAL CA: CPT | Performed by: ORTHOPAEDIC SURGERY

## 2022-03-19 PROCEDURE — 97161 PT EVAL LOW COMPLEX 20 MIN: CPT

## 2022-03-19 PROCEDURE — 63710000001 ACETAMINOPHEN 325 MG TABLET: Performed by: ORTHOPAEDIC SURGERY

## 2022-03-19 PROCEDURE — 25010000002 CEFAZOLIN IN DEXTROSE 2-4 GM/100ML-% SOLUTION: Performed by: ORTHOPAEDIC SURGERY

## 2022-03-19 PROCEDURE — 85025 COMPLETE CBC W/AUTO DIFF WBC: CPT | Performed by: ORTHOPAEDIC SURGERY

## 2022-03-19 RX ORDER — DOCUSATE SODIUM 100 MG/1
100 CAPSULE, LIQUID FILLED ORAL 2 TIMES DAILY
Qty: 30 CAPSULE | Refills: 0 | Status: SHIPPED | OUTPATIENT
Start: 2022-03-19 | End: 2022-04-03

## 2022-03-19 RX ORDER — OXYCODONE HYDROCHLORIDE 5 MG/1
5 TABLET ORAL EVERY 4 HOURS PRN
Qty: 40 TABLET | Refills: 0 | Status: SHIPPED | OUTPATIENT
Start: 2022-03-19

## 2022-03-19 RX ADMIN — DOCUSATE SODIUM 100 MG: 100 CAPSULE, LIQUID FILLED ORAL at 12:34

## 2022-03-19 RX ADMIN — CEFAZOLIN SODIUM 2 G: 2 INJECTION, SOLUTION INTRAVENOUS at 01:28

## 2022-03-19 RX ADMIN — ACETAMINOPHEN 650 MG: 325 TABLET ORAL at 02:21

## 2022-03-19 NOTE — PLAN OF CARE
Problem: Adult Inpatient Plan of Care  Goal: Plan of Care Review  Recent Flowsheet Documentation  Taken 3/19/2022 1057 by Elder Priest, OT  Progress: improving  Plan of Care Reviewed With: patient  Outcome Evaluation: Pt UIC upon arrival - spouse present for all education. OT reviewed sling management/fit/wear schedule, R shoulder precautions during ADL completion, and RUE HEP, nerve block safety, and handout review. Spouse participated with doff/don of sling and axilla care. Will cont IPOT per POC as tolerated. Rec d/c to home with spouse assist and supervision.

## 2022-03-19 NOTE — CASE MANAGEMENT/SOCIAL WORK
Continued Stay Note  [facility]     Patient Name: Racheal De La Vega  MRN: [id number]  Today's Date: 3/19/2022    Admit Date: 3/18/2022     Discharge Plan     Row Name 03/19/22 1233       Plan    Plan Home with home health    Patient/Family in Agreement with Plan yes    Plan Comments Patient and spouse insisted on home health. Voiced spouse could not assist patient at home and needed more assistance with patients sling.  Order placed for PT/OT and referral called to [facility] and they will see her on Monday or Tuesday.    Final Discharge Disposition Code 06 - home with home health care               Discharge Codes    No documentation.               Expected Discharge Date and Time     Expected Discharge Date Expected Discharge Time    Mar 19, 2022             Shasha Moss RN

## 2022-03-19 NOTE — DISCHARGE SUMMARY
Patient Name: Racheal De La Vega  MRN: [id number]  : 1950  DOS: 3/19/2022    Attending: Alen Santacruz MD    Primary Care Provider: Charlie Dominguez MD    Date of Admission:.3/18/2022  7:28 AM    Date of Discharge:  3/19/2022    Discharge Diagnosis:   S/P reverse total shoulder arthroplasty, left    Glenohumeral arthritis, left    Left shoulder pain      Hospital Course    At admit:  Patient is a pleasant 71 y.o. female presented for scheduled surgery by Dr. Santacruz     She underwent left reverse total shoulder arthroplasty under GA and a block, tolerated surgery well, was admitted for further management.     Seen in her room after surgery, doing fairly well.  Had some postoperative pain, no complaints of nausea, vomiting, or shortness of breath.     She was seen by OT earlier, required max assist with sling management.    After admit:  Patient was provided pain medications as needed for pain control, along with interscalene nerve block infusion of Ropivacaine    Adjustments were made to pain medications to optimize postop pain management.   Risks and benefits of opiate medications discussed with patient. CARO report on chart was reviewed.    The patient was seen by OT and was taught exercises for left arm.  The patient used an IS for atelectasis prophylaxis and mechanicals for DVT prophylaxis.    Home medications were resumed as appropriate, and labs were monitored and remained fairly stable.     With the progress she has made, Ms. De La Vega is ready for DC home today.      The patient will have an arrow pump ( instructed on it during this admit)  Discussed with patient regarding plan and she shows understanding and agreement.        Procedures Performed  Procedure(s):  TOTAL REVERSE SHOULDER ARTHROPLASTY LEFT       Pertinent Test Results:    I reviewed the patient's new clinical results.   Results from last 7 days   Lab Units 22  0807 22  0944   WBC 10*3/mm3 17.20* 8.14  "  HEMOGLOBIN g/dL 12.1 13.9   HEMATOCRIT % 37.3 41.5   PLATELETS 10*3/mm3 256 307     Results from last 7 days   Lab Units 22  0807 22  0944   SODIUM mmol/L 140 141   POTASSIUM mmol/L 4.3 4.8   CHLORIDE mmol/L 104 102   CO2 mmol/L 25.0 29.0   BUN mg/dL 13 12   CREATININE mg/dL 0.82 0.83   CALCIUM mg/dL 9.7 10.1   BILIRUBIN mg/dL  --  0.5   ALK PHOS U/L  --  81   ALT (SGPT) U/L  --  9   AST (SGOT) U/L  --  15   GLUCOSE mg/dL 89 103*     I reviewed the patient's new imaging including images and reports.      Occupational Therapy  Taken 3/19/2022 1057 by Elder Priest OT  Progress: improving  Plan of Care Reviewed With: patient  Outcome Evaluation: Pt UIC upon arrival - spouse present for all education. OT reviewed sling management/fit/wear schedule, R shoulder precautions during ADL completion, and RUE HEP, nerve block safety, and handout review. Spouse participated with doff/don of sling and axilla care. Will cont IPOT per POC as tolerated. Rec d/c to home with spouse assist and supervision.         Physical therapy  Progress: improving  Outcome Evaluation: PT eval complete. Pt ambulated 600 feet with supervision. O2 sats remaining above 94% during ambulation, while on room air. Gait training limited by fatigue. Bed mobility, STS and toilet transfer performed with supervision. Reviewed NWB precautions on L UE. Functionally, pt safe to d/c home with assist today from a PT perspective.                    Discharge Assessment:       Visit Vitals  /77 (BP Location: Right arm, Patient Position: Lying)   Pulse 83   Temp 98 °F (36.7 °C) (Oral)   Resp 16   Ht 162.6 cm (64\")   Wt 69.9 kg (154 lb)   SpO2 95%   Breastfeeding No   BMI 26.43 kg/m²     Temp (24hrs), Av.5 °F (36.4 °C), Min:96.8 °F (36 °C), Max:98 °F (36.7 °C)      General Appearance:    Alert, cooperative, in no acute distress   Lungs:     Clear to auscultation,respirations regular, even and   unlabored    Heart:    Regular rhythm and " normal rate, normal S1 and S2    Abdomen:     Normal bowel sounds, no masses, no organomegaly, soft        non-tender, non-distended, no guarding, no rebound                 tenderness   Extremities:  Left UE in a sling, CDI dressing over left shoulder incision . Interscalene nerve block cath present.  Distal pulses, cap refill,  intact. Movement and sensation intact distally     Pulses:   Pulses palpable and equal bilaterally   Skin:   No bleeding, bruising or rash        Discharge Disposition: Home          Discharge Medications      New Medications      Instructions Start Date   docusate sodium 100 MG capsule  Commonly known as: COLACE   100 mg, Oral, 2 Times Daily      oxyCODONE 5 MG immediate release tablet  Commonly known as: Roxicodone   5 mg, Oral, Every 4 Hours PRN      Ropivacine HCl-NaCl  Commonly known as: NAROPIN   6 mL/hr (12 mg/hr), Peripheral Nerve, Continuous         Continue These Medications      Instructions Start Date   acetaminophen 500 MG tablet  Commonly known as: TYLENOL   1,000 mg, Oral, Every 6 Hours PRN      Align capsule   1 capsule, Oral, Daily      atorvastatin 40 MG tablet  Commonly known as: LIPITOR   40 mg, Oral, Daily, PT USUALLY TAKES ONCE WEEKLY      Biotin 37376 MCG tablet   10,000 mcg, Oral, Daily      COD LIVER OIL PO   1 tablet, Oral, Daily      ESTRACE VA   0.5 mg, Vaginal, Weekly      estradiol 0.025 MG/24HR patch  Commonly known as: VIVELLE-DOT   1 patch, Transdermal, 2 Times Weekly      Magnesium 200 MG tablet   200 mg, Oral, Daily      ondansetron 4 MG tablet  Commonly known as: ZOFRAN   Take 1 tablet by mouth Every 8 (Eight) Hours As Needed for nausea.      vitamin D3 125 MCG (5000 UT) tablet tablet   5,000 Units, Oral, Daily      Zinc 50 MG tablet   50 mg, Oral, Daily         Stop These Medications    benzoyl peroxide 5 % external liquid  Generic drug: benzoyl peroxide            Discharge Diet:   Diet Instructions    Regular diet as tolerated           Activity at  Discharge:   Activity Instructions    Activity as instructed by your physciain         NWB left upper extremity, ROM elbow, wrist, and hand per Dr. Santacruz's instructions.    Follow-up Appointments  Alen Santacruz MD per his orders         Linda Martin MD  03/19/22  10:48 EDT

## 2022-03-19 NOTE — PLAN OF CARE
Goal Outcome Evaluation:  Plan of Care Reviewed With: patient        Progress: improving     Pt is alert and oriented X 4. VSS,RA. Pt frequently seeks for staff attention. No c/o pain to left shoulder. HA treated with prn tylenol. Nerve block infusing at 6 ml/hr. Ambulating with 1 person assist to bathroom. Voiding spontaneously.

## 2022-03-19 NOTE — PLAN OF CARE
Problem: Adult Inpatient Plan of Care  Goal: Plan of Care Review  Outcome: Met  Goal: Patient-Specific Goal (Individualized)  Outcome: Met  Goal: Absence of Hospital-Acquired Illness or Injury  Outcome: Met  Goal: Optimal Comfort and Wellbeing  Outcome: Met  Goal: Readiness for Transition of Care  Outcome: Met     Problem: Adjustment to Surgery (Shoulder Arthroplasty)  Goal: Optimal Coping  Outcome: Met     Problem: Bleeding (Shoulder Arthroplasty)  Goal: Absence of Bleeding  Outcome: Met     Problem: Bowel Motility Impaired (Shoulder Arthroplasty)  Goal: Effective Bowel Elimination  Outcome: Met     Problem: Fluid and Electrolyte Imbalance (Shoulder Arthroplasty)  Goal: Fluid and Electrolyte Balance  Outcome: Met     Problem: Functional Ability Impaired (Shoulder Arthroplasty)  Goal: Optimal Functional Ability  Outcome: Met     Problem: Infection (Shoulder Arthroplasty)  Goal: Absence of Infection Signs and Symptoms  Outcome: Met     Problem: Neurovascular Compromise (Shoulder Arthroplasty)  Goal: Intact Neurovascular Status  Outcome: Met     Problem: Ongoing Anesthesia Effects (Shoulder Arthroplasty)  Goal: Anesthesia/Sedation Recovery  Outcome: Met     Problem: Pain (Shoulder Arthroplasty)  Goal: Acceptable Pain Control  Outcome: Met     Problem: Postoperative Nausea and Vomiting (Shoulder Arthroplasty)  Goal: Nausea and Vomiting Relief  Outcome: Met     Problem: Skin Injury Risk Increased  Goal: Skin Health and Integrity  Outcome: Met     Problem: Postoperative Urinary Retention (Shoulder Arthroplasty)  Goal: Effective Urinary Elimination  Outcome: Met     Problem: Fall Injury Risk  Goal: Absence of Fall and Fall-Related Injury  Outcome: Met   Goal Outcome Evaluation:

## 2022-03-19 NOTE — THERAPY EVALUATION
Patient Name: Racheal De La Vega  : 1950    MRN: [id number]                              Today's Date: 3/19/2022       Admit Date: 3/18/2022    Visit Dx:     ICD-10-CM ICD-9-CM   1. S/P reverse total shoulder arthroplasty, left  Z96.612 V43.61     Patient Active Problem List   Diagnosis   • Primary localized osteoarthrosis of left shoulder region   • Bursitis of left shoulder   • Impingement syndrome of left shoulder   • Glenohumeral arthritis, left   • Left shoulder pain   • S/P reverse total shoulder arthroplasty, left   • Postoperative pain     Past Medical History:   Diagnosis Date   • Colon polyp    • Diverticulosis    • Headache      Past Surgical History:   Procedure Laterality Date   • BLADDER SURGERY      PROLAPSED BLADDER   • CATARACT EXTRACTION, BILATERAL     • COLONOSCOPY     • JOINT REPLACEMENT Left 1992    SHOULDER   • RETINAL DETACHMENT SURGERY Bilateral    • TOE SURGERY Left     FIRST TOE   • TOTAL KNEE ARTHROPLASTY Left       General Information     Row Name 22 0835          Physical Therapy Time and Intention    Document Type therapy note (daily note)  -DANIELLE     Mode of Treatment individual therapy;physical therapy  -     Row Name 22 0835          General Information    Patient Profile Reviewed yes  -DANIELLE     Prior Level of Function independent:;all household mobility;transfer;bed mobility;min assist:;ADL's  -DANIELLE     Existing Precautions/Restrictions fall;left;shoulder;non-weight bearing;other (see comments)  NWB L UE, sling with abduction pillow, nerve block  -DANIELLE     Barriers to Rehab none identified  -DANIELLE     Row Name 22 0835          Living Environment    People in Home spouse  -DANIELLE     Row Name 22 0835          Home Main Entrance    Number of Stairs, Main Entrance none  -DANIELLE     Row Name 22 0835          Stairs Within Home, Primary    Stairs, Within Home, Primary 0  -DANIELLE     Row Name 22 0835          Cognition    Orientation Status (Cognition) oriented x 3   -     Row Name 03/19/22 0835          Safety Issues, Functional Mobility    Safety Issues Affecting Function (Mobility) safety precaution awareness;safety precautions follow-through/compliance  -     Impairments Affecting Function (Mobility) endurance/activity tolerance;shortness of breath  -           User Key  (r) = Recorded By, (t) = Taken By, (c) = Cosigned By    Initials Name Provider Type    DANIELLE Damien Marie, PT Physical Therapist               Mobility     Row Name 03/19/22 0835          Bed Mobility    Bed Mobility supine-sit;scooting/bridging  -DANIELLE     Scooting/Bridging [place] (Bed Mobility) supervision;verbal cues  -DANIELLE     Supine-Sit [place] (Bed Mobility) supervision;verbal cues  -DANIELLE     Assistive Device (Bed Mobility) head of bed elevated;bed rails  -DANIELLE     Comment, (Bed Mobility) Verbal cues for LE management off of EOB and trunk control into sitting  -     Row Name 03/19/22 0835          Transfers    Comment, (Transfers) Supervision for toilet transfer  -     Row Name 03/19/22 0835          Sit-Stand Transfer    Sit-Stand [place] (Transfers) supervision;verbal cues  -DANIELLE     Comment, (Sit-Stand Transfer) Verbal cues for safe hand placement during standing/sitting and maintaining NWB on L UE  -     Row Name 03/19/22 0835          Gait/Stairs (Locomotion)    [place] Level (Gait) supervision;verbal cues  -DANIELLE     Distance in Feet (Gait) 600  -DANIELLE     Deviations/Abnormal Patterns (Gait) bilateral deviations;fatmata decreased;gait speed decreased;stride length decreased  -DANIELLE     Bilateral Gait Deviations forward flexed posture  -     [place] Level (Stairs) not tested  -     Comment, (Gait/Stairs) Pt ambulated with step through pattern and decreased speed. Verbal cues for maintaining upright posture. O2 sats remaining above 94% during ambulation, while on room air. Gait training limited by fatigue.  -     Row Name 03/19/22 0835          Mobility    Extremity  Weight-bearing Status left upper extremity  -DANIELLE     Left Upper Extremity (Weight-bearing Status) non weight-bearing (NWB)   -           User Key  (r) = Recorded By, (t) = Taken By, (c) = Cosigned By    Initials Name Provider Type    Damien Chen, PT Physical Therapist               Obj/Interventions     Row Name 03/19/22 0835          Strength Comprehensive (MMT)    General Manual Muscle Testing (MMT) Assessment lower extremity strength deficits identified  -DANIELLE     Comment, General Manual Muscle Testing (MMT) Assessment Bilateral LE functionally 4+/5  -DANIELLE     Row Name 03/19/22 0835          Balance    Balance Assessment standing static balance;standing dynamic balance  -     Static Standing Balance supervision  -     Dynamic Standing Balance supervision  -DANIELLE     Row Name 03/19/22 0835          Sensory Assessment (Somatosensory)    Sensory Assessment (Somatosensory) LE sensation intact  -           User Key  (r) = Recorded By, (t) = Taken By, (c) = Cosigned By    Initials Name Provider Type    Damien Chen, PT Physical Therapist               Goals/Plan     Row Name 03/19/22 0835          Bed Mobility Goal 1 (PT)    Activity/Assistive Device (Bed Mobility Goal 1, PT) sit to supine/supine to sit  -DANIELLE     [place] Level/Cues Needed (Bed Mobility Goal 1, PT) modified independence  -DANIELLE     Time Frame (Bed Mobility Goal 1, PT) long term goal (LTG);5 days  -     Row Name 03/19/22 0835          Transfer Goal 1 (PT)    Activity/Assistive Device (Transfer Goal 1, PT) sit-to-stand/stand-to-sit  -DANIELLE     [place] Level/Cues Needed (Transfer Goal 1, PT) modified independence  -DANIELLE     Time Frame (Transfer Goal 1, PT) long term goal (LTG);5 days  -     Row Name 03/19/22 0835          Gait Training Goal 1 (PT)    Activity/Assistive Device (Gait Training Goal 1, PT) gait (walking locomotion)  -DANIELLE     [place] Level (Gait Training Goal 1, PT) modified independence  -DANIELLE     Distance (Gait Training Goal 1, PT)  1000 feet  -DANIELLE     Time Frame (Gait Training Goal 1, PT) long term goal (LTG);5 days  -DANIELLE           User Key  (r) = Recorded By, (t) = Taken By, (c) = Cosigned By    Initials Name Provider Type    Damien Chen, PT Physical Therapist               Clinical Impression     Row Name 03/19/22 0835          Pain    Pretreatment Pain Rating 0/10 - no pain  -DANIELLE     Posttreatment Pain Rating 0/10 - no pain  -DANIELLE     Row Name 03/19/22 0835          Therapy Assessment/Plan (PT)    Rehab Potential (PT) good, to achieve stated therapy goals  -DANIELLE     Criteria for Skilled Interventions Met (PT) yes;meets criteria;skilled treatment is necessary  -DANIELLE     Row Name 03/19/22 0835          Vital Signs    Pre SpO2 (%) 98  -DANIELLE     O2 Delivery Pre Treatment room air  -DANIELLE     Intra SpO2 (%) 94  -DANIELLE     O2 Delivery Intra Treatment room air  -DANIELLE     Post SpO2 (%) 98  -DANIELLE     O2 Delivery Post Treatment room air  -DANIELLE     Pre Patient Position Supine  -DANIELLE     Intra Patient Position Standing  -DANIELLE     Post Patient Position Sitting  -DANIELLE     Row Name 03/19/22 0835          Positioning and Restraints    Pre-Treatment Position in bed  -DANIELLE     Post Treatment Position chair  -DANIELLE     In Chair notified nsg;reclined;call light within reach;encouraged to call for assist;exit alarm on;legs elevated;with family/caregiver  -DANIELLE           User Key  (r) = Recorded By, (t) = Taken By, (c) = Cosigned By    Initials Name Provider Type    Damien Chen, MARKY Physical Therapist               Outcome Measures     Row Name 03/19/22 0835 03/19/22 0745       How much help from another person do you currently need...    Turning from your back to your side while in flat bed without using bedrails? 4  -DANIELLE 3  -MK    Moving from lying on back to sitting on the side of a flat bed without bedrails? 4  -DANIELLE 3  -MK    Moving to and from a bed to a chair (including a wheelchair)? 4  -DANIELLE 3  -MK    Standing up from a chair using your arms (e.g., wheelchair, bedside chair)? 4  -DANIELLE 3  -MK     Climbing 3-5 steps with a railing? 3  -DANIELLE 3  -MK    To walk in hospital room? 3  -DANIELLE 3  -MK    AM-PAC 6 Clicks Score (PT) 22  -DANIELLE 18  -MK    Row Name 03/19/22 1101 03/19/22 0835       Functional Assessment    Outcome Measure Options AM-PAC 6 Clicks Daily Activity (OT)  - AM-PAC 6 Clicks Basic Mobility (PT)  -          User Key  (r) = Recorded By, (t) = Taken By, (c) = Cosigned By    Initials Name Provider Type    Kimberly Plunkett, RN Registered Nurse    Damien Chen, PT Physical Therapist    Elder Molina, OT Occupational Therapist                             Physical Therapy Education                 Title: PT OT SLP Therapies (Done)     Topic: Physical Therapy (Done)     Point: Mobility training (Done)     Learning Progress Summary           Patient Acceptance, E,D, VU by DANIELLE at 3/19/2022 0835    Comment: Educated on safe sequencing with bed mobility, ambulatory/toilet transfers, and gait. Reviewed NWB on L UE.                   Point: Home exercise program (Done)     Learning Progress Summary           Patient Acceptance, E,D, VU by DANIELLE at 3/19/2022 0835    Comment: Educated on safe sequencing with bed mobility, ambulatory/toilet transfers, and gait. Reviewed NWB on L UE.                   Point: Body mechanics (Done)     Learning Progress Summary           Patient Acceptance, E,D, VU by DANIELLE at 3/19/2022 0835    Comment: Educated on safe sequencing with bed mobility, ambulatory/toilet transfers, and gait. Reviewed NWB on L UE.                   Point: Precautions (Done)     Learning Progress Summary           Patient Acceptance, E,D, VU by DANIELLE at 3/19/2022 0835    Comment: Educated on safe sequencing with bed mobility, ambulatory/toilet transfers, and gait. Reviewed NWB on L UE.                               User Key     Initials Effective Dates Name Provider Type Discipline    DANIELLE 06/16/21 -  Damien Marie, PT Physical Therapist PT              PT Recommendation and Plan  Planned Therapy Interventions  (PT): balance training, bed mobility training, gait training, home exercise program, patient/family education, transfer training, strengthening  Plan of Care Reviewed With: patient, spouse  Progress: improving  Outcome Evaluation: PT eval complete. Pt ambulated 600 feet with supervision. O2 sats remaining above 94% during ambulation, while on room air. Gait training limited by fatigue. Bed mobility, STS and toilet transfer performed with supervision. Reviewed NWB precautions on L UE. Functionally, pt safe to d/c home with assist today from a PT perspective.     Time Calculation:    PT Charges     Row Name 03/19/22 0835             Time Calculation    Start Time 0835  -DANIELLE      PT Received On 03/19/22  -DANIELLE      PT Goal Re-Cert Due Date 03/29/22  -DANIELLE              Time Calculation- PT    Total Timed Code Minutes- PT 10 minute(s)  -DANIELLE              Timed Charges    54032 - Gait Training Minutes  10  -DANIELLE              Untimed Charges    PT Eval/Re-eval Minutes 46  -DANIELLE              Total Minutes    Timed Charges Total Minutes 10  -DANIELLE      Untimed Charges Total Minutes 46  -DANIELLE       Total Minutes 56  -DANIELLE            User Key  (r) = Recorded By, (t) = Taken By, (c) = Cosigned By    Initials Name Provider Type    Damien Chen, PT Physical Therapist              Therapy Charges for Today     Code Description Service Date Service Provider Modifiers Qty    65089179906 HC GAIT TRAINING EA 15 MIN 3/19/2022 Damien Marie, PT GP 1    31906674623 HC PT EVAL LOW COMPLEXITY 4 3/19/2022 Damien Marie, PT GP 1          PT G-Codes  Outcome Measure Options: AM-PAC 6 Clicks Daily Activity (OT)  AM-PAC 6 Clicks Score (PT): 22  AM-PAC 6 Clicks Score (OT): 15    Damien Marie PT  3/19/2022

## 2022-03-19 NOTE — PLAN OF CARE
Problem: Adult Inpatient Plan of Care  Goal: Plan of Care Review  Recent Flowsheet Documentation  Taken 3/19/2022 0835 by Damien Marie, PT  Progress: improving  Plan of Care Reviewed With:   patient   spouse  Outcome Evaluation: PT eval complete. Pt ambulated 600 feet with supervision. O2 sats remaining above 94% during ambulation, while on room air. Gait training limited by fatigue. Bed mobility, STS and toilet transfer performed with supervision. Reviewed NWB precautions on L UE. Functionally, pt safe to d/c home with assist today from a PT perspective.   Goal Outcome Evaluation:  Plan of Care Reviewed With: patient, spouse        Progress: improving  Outcome Evaluation: PT eval complete. Pt ambulated 600 feet with supervision. O2 sats remaining above 94% during ambulation, while on room air. Gait training limited by fatigue. Bed mobility, STS and toilet transfer performed with supervision. Reviewed NWB precautions on L UE. Functionally, pt safe to d/c home with assist today from a PT perspective.

## 2022-03-19 NOTE — THERAPY TREATMENT NOTE
Patient Name: Racheal De La Vega  : 1950    MRN: [id number]                              Today's Date: 3/19/2022       Admit Date: 3/18/2022    Visit Dx:     ICD-10-CM ICD-9-CM   1. S/P reverse total shoulder arthroplasty, left  Z96.612 V43.61     Patient Active Problem List   Diagnosis   • Primary localized osteoarthrosis of left shoulder region   • Bursitis of left shoulder   • Impingement syndrome of left shoulder   • Glenohumeral arthritis, left   • Left shoulder pain   • S/P reverse total shoulder arthroplasty, left   • Postoperative pain     Past Medical History:   Diagnosis Date   • Colon polyp    • Diverticulosis    • Headache      Past Surgical History:   Procedure Laterality Date   • BLADDER SURGERY      PROLAPSED BLADDER   • CATARACT EXTRACTION, BILATERAL     • COLONOSCOPY     • JOINT REPLACEMENT Left 1992    SHOULDER   • RETINAL DETACHMENT SURGERY Bilateral    • TOE SURGERY Left     FIRST TOE   • TOTAL KNEE ARTHROPLASTY Left       General Information     Row Name 22 1049          OT Time and Intention    Document Type therapy note (daily note)  -CS     Mode of Treatment occupational therapy  -CS     Row Name 22 1049          General Information    Patient Profile Reviewed yes  -CS     Existing Precautions/Restrictions fall;left;shoulder;non-weight bearing;other (see comments)  s/p L rTSA, NWB precautions, sling with abduction pillow, nerve block  -CS     Barriers to Rehab previous functional deficit  -CS     Row Name 22 1049          Cognition    Orientation Status (Cognition) oriented x 3  -CS     Row Name 22 1049          Safety Issues, Functional Mobility    Safety Issues Affecting Function (Mobility) awareness of need for assistance;insight into deficits/self-awareness;safety precaution awareness;problem-solving;safety precautions follow-through/compliance;sequencing abilities  -CS     Impairments Affecting Function (Mobility) balance;endurance/activity  tolerance;pain;strength;range of motion (ROM)  -           User Key  (r) = Recorded By, (t) = Taken By, (c) = Cosigned By    Initials Name Provider Type     Elder Priest OT Occupational Therapist                 Mobility/ADL's     Row Name 03/19/22 1050          Bed Mobility    Comment, (Bed Mobility) [facility] upon arrival, reviewed safe sleep positioning and safety awareness w/ LUE during bed mobility at home  -     Row Name 03/19/22 1050          Transfers    Transfers sit-stand transfer;stand-sit transfer  -     Comment, (Transfers) education and cues for safety awareness w/ LUE and hand placement with RUE  -     Sit-Stand [place] (Transfers) standby assist;verbal cues  -     [place] Level (Toilet Transfer) standby assist;verbal cues  -     Row Name 03/19/22 1050          Functional Mobility    Functional Mobility- Comment SBA for in-room distance to sinkside and return to chair  -     Row Name 03/19/22 1050          Activities of Daily Living    BADL Assessment/Intervention upper body dressing;bathing;grooming  -     Row Name 03/19/22 1050          Mobility    Extremity Weight-bearing Status left upper extremity  -     Left Upper Extremity (Weight-bearing Status) non weight-bearing (NWB)   -     Row Name 03/19/22 1050          Bathing Assessment/Intervention    [place] Level (Bathing) upper body;bathing skills;verbal cues;minimum assist (75% patient effort)  -CS     Position (Bathing) unsupported sitting  -CS     Comment, (Bathing) Reviewed LUE and nerve block precautions,  assisted/performed axilla care with demo and verbal cues  -     Row Name 03/19/22 1050          Upper Body Dressing Assessment/Training    [place] Level (Upper Body Dressing) doff;don;maximum assist (25% patient effort)  -CS     Position (Upper Body Dressing) unsupported sitting  -CS     Comment, (Upper Body Dressing) doff/don sling, reviewed management/fit/wear schedule, spouse verbalized  understanding, handout in room  -     Row Name 03/19/22 1050          Grooming Assessment/Training    [place] Level (Grooming) standby assist;hair care, combing/brushing  -     Position (Grooming) sink side  -           User Key  (r) = Recorded By, (t) = Taken By, (c) = Cosigned By    Initials Name Provider Type     Elder Priest, OT Occupational Therapist               Obj/Interventions     Row Name 03/19/22 1054          Elbow/Forearm (Therapeutic Exercise)    Elbow/Forearm (Therapeutic Exercise) AROM (active range of motion)  -     Elbow/Forearm AROM (Therapeutic Exercise) left;flexion;extension;supination;pronation;10 repetitions  -     Row Name 03/19/22 1054          Wrist (Therapeutic Exercise)    Wrist (Therapeutic Exercise) AROM (active range of motion)  -     Wrist AROM (Therapeutic Exercise) left;flexion;extension;10 repetitions  -     Row Name 03/19/22 1054          Hand (Therapeutic Exercise)    Hand (Therapeutic Exercise) AROM (active range of motion)  -     Hand AROM/AAROM (Therapeutic Exercise) left;AROM (active range of motion);finger flexion;finger extension;10 repetitions  -     Row Name 03/19/22 1054          Motor Skills    Therapeutic Exercise elbow/forearm;wrist;other (see comments)  Reviewed HUMBERTO AROM HEP (wrist/hand/elbow only) with spouse and patient, handout in room, lynnette understanding  -     Row Name 03/19/22 1054          Balance    Balance Assessment sitting static balance;sitting dynamic balance;standing static balance  -     Static Sitting Balance independent  -     Dynamic Sitting Balance supervision  -     Position, Sitting Balance unsupported;sitting edge of bed  -     Static Standing Balance supervision  -     Dynamic Standing Balance supervision  -     Position/Device Used, Standing Balance unsupported  -     Balance Interventions sit to stand;sitting;standing  -     Comment, Balance no LOB during ADL completion  -           User  Key  (r) = Recorded By, (t) = Taken By, (c) = Cosigned By    Initials Name Provider Type    CS Elder Priest, OT Occupational Therapist               Goals/Plan    No documentation.                Clinical Impression     Row Name 03/19/22 1057          Pain Assessment    Pretreatment Pain Rating 0/10 - no pain  -CS     Posttreatment Pain Rating 0/10 - no pain  -CS     Pre/Posttreatment Pain Comment tolerated session  -CS     Additional Documentation Pain Scale: Word Pre/Post-Treatment (Group)  -[facility] Name 03/19/22 1057          Plan of Care Review    Plan of Care Reviewed With patient  -CS     Progress improving  -CS     Outcome Evaluation Pt C upon arrival - spouse present for all education. OT reviewed sling management/fit/wear schedule, R shoulder precautions during ADL completion, and RUE HEP, nerve block safety, and handout review. Spouse participated with doff/don of sling and axilla care. Will cont IPOT per POC as tolerated. Rec d/c to home with spouse assist and supervision.  -CS     Row Name 03/19/22 1057          Therapy Plan Review/Discharge Plan (OT)    Anticipated Discharge Disposition (OT) home with assist  -[facility] Name 03/19/22 1057          Vital Signs    Pre Systolic BP Rehab --  RN cleared for tx, VSS on RA  -CS     O2 Delivery Pre Treatment room air  -CS     O2 Delivery Intra Treatment room air  -CS     Pre Patient Position Sitting  -CS     Intra Patient Position Standing  -CS     Post Patient Position Sitting  -[facility] Name 03/19/22 1057          Positioning and Restraints    Post Treatment Position chair  -CS     In Chair reclined;sitting;call light within reach;exit alarm on;encouraged to call for assist;with family/caregiver;notified nsg;waffle cushion;legs elevated;on mechanical lift sling;heels elevated  -CS           User Key  (r) = Recorded By, (t) = Taken By, (c) = Cosigned By    Initials Name Provider Type    CS Elder Priest, OT Occupational Therapist                Outcome Measures     Row Name 03/19/22 1101          How much help from another is currently needed...    Putting on and taking off regular lower body clothing? 2  -CS     Bathing (including washing, rinsing, and drying) 2  -CS     Toileting (which includes using toilet bed pan or urinal) 2  -CS     Putting on and taking off regular upper body clothing 3  -CS     Taking care of personal grooming (such as brushing teeth) 3  -CS     Eating meals 3  -CS     AM-PAC 6 Clicks Score (OT) 15  -CS     Row Name 03/19/22 0835 03/19/22 0745       How much help from another person do you currently need...    Turning from your back to your side while in flat bed without using bedrails? 4  -DANIELLE 3  -MK    Moving from lying on back to sitting on the side of a flat bed without bedrails? 4  -DANIELLE 3  -MK    Moving to and from a bed to a chair (including a wheelchair)? 4  -DANIELLE 3  -MK    Standing up from a chair using your arms (e.g., wheelchair, bedside chair)? 4  -DANIELLE 3  -MK    Climbing 3-5 steps with a railing? 3  -DANIELLE 3  -MK    To walk in hospital room? 3  -DANIELLE 3  -MK    AM-PAC 6 Clicks Score (PT) 22  -DANIELLE 18  -MK    Row Name 03/19/22 1101 03/19/22 0835       Functional Assessment    Outcome Measure Options AM-PAC 6 Clicks Daily Activity (OT)  -CS AM-PAC 6 Clicks Basic Mobility (PT)  -DANIELLE          User Key  (r) = Recorded By, (t) = Taken By, (c) = Cosigned By    Initials Name Provider Type    Kimberly Plunkett, RN Registered Nurse    Damien Chen, PT Physical Therapist    Elder Molina OT Occupational Therapist                Occupational Therapy Education                 Title: PT OT SLP Therapies (Done)     Topic: Occupational Therapy (Done)     Point: ADL training (Done)     Description:   Instruct learner(s) on proper safety adaptation and remediation techniques during self care or transfers.   Instruct in proper use of assistive devices.              Learning Progress Summary           Patient Nonacceptance, E,D,H, VU,DU by  CS at 3/19/2022 1102    Acceptance, E,D, VU,NR by TB at 3/18/2022 1809   Family Acceptance, E,D, VU,NR by TB at 3/18/2022 1809   Significant Other Nonacceptance, E,D,H, VU,DU by CS at 3/19/2022 1102                   Point: Home exercise program (Done)     Description:   Instruct learner(s) on appropriate technique for monitoring, assisting and/or progressing therapeutic exercises/activities.              Learning Progress Summary           Patient Nonacceptance, E,D,H, VU,DU by CS at 3/19/2022 1102    Acceptance, E,D, VU,NR by TB at 3/18/2022 1809   Family Acceptance, E,D, VU,NR by TB at 3/18/2022 1809   Significant Other Nonacceptance, E,D,H, VU,DU by CS at 3/19/2022 1102                   Point: Precautions (Done)     Description:   Instruct learner(s) on prescribed precautions during self-care and functional transfers.              Learning Progress Summary           Patient Nonacceptance, E,D,H, VU,DU by CS at 3/19/2022 1102    Acceptance, E,D, VU,NR by TB at 3/18/2022 1809   Family Acceptance, E,D, VU,NR by TB at 3/18/2022 1809   Significant Other Nonacceptance, E,D,H, VU,DU by CS at 3/19/2022 1102                   Point: Body mechanics (Done)     Description:   Instruct learner(s) on proper positioning and spine alignment during self-care, functional mobility activities and/or exercises.              Learning Progress Summary           Patient Nonacceptance, E,D,H, VU,DU by CS at 3/19/2022 1102   Significant Other Nonacceptance, E,D,H, VU,DU by CS at 3/19/2022 1102                               User Key     Initials Effective Dates Name Provider Type Discipline     06/16/21 -  Magaly Méndez, OT Occupational Therapist OT     06/16/21 -  Elder Priest OT Occupational Therapist OT              OT Recommendation and Plan     Plan of Care Review  Plan of Care Reviewed With: patient  Progress: improving  Outcome Evaluation: Pt [facility] upon arrival - spouse present for all education. OT reviewed sling  management/fit/wear schedule, R shoulder precautions during ADL completion, and RUE HEP, nerve block safety, and handout review. Spouse participated with doff/don of sling and axilla care. Will cont IPOT per POC as tolerated. Rec d/c to home with spouse assist and supervision.     Time Calculation:    Time Calculation- OT     Row Name 03/19/22 1103             Time Calculation- OT    OT Start Time 1000  -CS      OT Received On 03/19/22  -CS      OT Goal Re-Cert Due Date 03/28/22  -CS              Timed Charges    45849 - OT Therapeutic Exercise Minutes 4  -CS      04076 - OT Therapeutic Activity Minutes 10  -CS      56201 - OT Self Care/Mgmt Minutes 26  -CS              Total Minutes    Timed Charges Total Minutes 40  -CS       Total Minutes 40  -CS            User Key  (r) = Recorded By, (t) = Taken By, (c) = Cosigned By    Initials Name Provider Type    CS Elder Priest, OT Occupational Therapist              Therapy Charges for Today     Code Description Service Date Service Provider Modifiers Qty    65217063873 HC OT SELF CARE/MGMT/TRAIN EA 15 MIN 3/19/2022 Elder Priest, OT GO 2    10965612770 HC OT THERAPEUTIC ACT EA 15 MIN 3/19/2022 Elder Priest, OT GO 1               Elder LAUREN Priest OT  3/19/2022

## 2022-03-20 NOTE — PROGRESS NOTES
KELVIN York    Nerve Cath Post Op Call    Patient Name: Racheal De La Vega  :  1950  MRN:  [id number]  Date of Discharge: 3/19/2022    Nerve Cath Post Op Call:    Catheter Plan:Patient called, No answer. Message left to call [facility] pain service for any questions or complaints  Patient/Family instructed to call ON CALL anesthesia provider for any questions or problems.  Patient Follow Up:

## 2022-03-21 ENCOUNTER — HOME CARE VISIT (OUTPATIENT)
Dept: HOME HEALTH SERVICES | Facility: HOME HEALTHCARE | Age: 72
End: 2022-03-21

## 2022-03-21 VITALS
DIASTOLIC BLOOD PRESSURE: 78 MMHG | SYSTOLIC BLOOD PRESSURE: 137 MMHG | OXYGEN SATURATION: 97 % | HEART RATE: 77 BPM | RESPIRATION RATE: 18 BRPM | TEMPERATURE: 98.2 F

## 2022-03-21 PROCEDURE — G0151 HHCP-SERV OF PT,EA 15 MIN: HCPCS

## 2022-03-21 NOTE — PROGRESS NOTES
KELVIN York    Nerve Cath Post Op Call    Patient Name: Racheal De La Vega  :  1950  MRN:  [id number]  Date of Discharge: 3/19/2022    Nerve Cath Post Op Call:    Analgesia:Excellent  Side Effects:None  Catheter Site:clean  Patient Controlled ON Q pump infusion rate: 6ml/hr  Catheter Plan:Will continue with plan at home without changes and The patient was instructed to call ON CALL Anesthesia provider for any questions or problems  Patient/Family instructed to call ON CALL anesthesia provider for any questions or problems.  Patient Follow Up:

## 2022-03-22 NOTE — PROGRESS NOTES
KELVIN York    Nerve Cath Post Op Call    Patient Name: Racheal De La Vega  :  1950  MRN:  [id number]  Date of Discharge: 3/19/2022    Nerve Cath Post Op Call:    Analgesia:Excellent  Pain Score:0/10  Side Effects:None  Catheter Site:clean  Patient Controlled ON Q pump infusion rate: 6ml/hr  Catheter Plan:Will continue with plan at home without changes and The patient was instructed to call ON CALL Anesthesia provider for any questions or problems  Patient/Family instructed to call ON CALL anesthesia provider for any questions or problems.  Patient Follow Up:    Grandson will be home tomorrow and he will remove it when he comes home.

## 2022-03-23 ENCOUNTER — HOME CARE VISIT (OUTPATIENT)
Dept: HOME HEALTH SERVICES | Facility: HOME HEALTHCARE | Age: 72
End: 2022-03-23

## 2022-03-23 VITALS
DIASTOLIC BLOOD PRESSURE: 74 MMHG | OXYGEN SATURATION: 98 % | RESPIRATION RATE: 19 BRPM | HEART RATE: 74 BPM | TEMPERATURE: 97.8 F | SYSTOLIC BLOOD PRESSURE: 137 MMHG

## 2022-03-23 PROCEDURE — G0151 HHCP-SERV OF PT,EA 15 MIN: HCPCS

## 2022-03-25 ENCOUNTER — HOME CARE VISIT (OUTPATIENT)
Dept: HOME HEALTH SERVICES | Facility: HOME HEALTHCARE | Age: 72
End: 2022-03-25

## 2022-03-25 VITALS — HEART RATE: 99 BPM | DIASTOLIC BLOOD PRESSURE: 86 MMHG | SYSTOLIC BLOOD PRESSURE: 126 MMHG | OXYGEN SATURATION: 98 %

## 2022-03-25 PROCEDURE — G0152 HHCP-SERV OF OT,EA 15 MIN: HCPCS

## 2022-03-25 NOTE — HOME HEALTH
72 y/o F s/p recent reverse total shoulder replacement. Pt. seen for OT soraya today and will benefit from continued OT services to increase left UE ROM/function per her surgeon protocol. Pt. will also benefit from suggestion made by OT to improve home safety (adding bathroom grab bars and a shower chair, etc.) and increasing independence with ADLs ( dressing/bathing, etc.) at home.

## 2022-03-28 ENCOUNTER — HOME CARE VISIT (OUTPATIENT)
Dept: HOME HEALTH SERVICES | Facility: HOME HEALTHCARE | Age: 72
End: 2022-03-28

## 2022-03-28 VITALS — HEART RATE: 100 BPM | OXYGEN SATURATION: 95 % | DIASTOLIC BLOOD PRESSURE: 71 MMHG | SYSTOLIC BLOOD PRESSURE: 116 MMHG

## 2022-03-28 PROCEDURE — G0152 HHCP-SERV OF OT,EA 15 MIN: HCPCS

## 2022-03-28 NOTE — HOME HEALTH
Pt. tolerated OT session well. Pt. participated in 40 mins of theraptuic exercises/ADLs; Pt. demonstrated increased understanding of HEP and correct donning/doffing of her left UE ultra sling. Pt. very motivated/compliant with all guidelines/exercises.

## 2022-03-28 NOTE — CASE COMMUNICATION
Patient discharged from PT services secondary to met max potential.  Patient indep with written HEP and instructed in daily performance.  She will continue with OT services.

## 2022-03-31 ENCOUNTER — HOME CARE VISIT (OUTPATIENT)
Dept: HOME HEALTH SERVICES | Facility: HOME HEALTHCARE | Age: 72
End: 2022-03-31

## 2022-03-31 VITALS — SYSTOLIC BLOOD PRESSURE: 105 MMHG | OXYGEN SATURATION: 97 % | DIASTOLIC BLOOD PRESSURE: 69 MMHG | HEART RATE: 89 BPM

## 2022-03-31 PROCEDURE — G0152 HHCP-SERV OF OT,EA 15 MIN: HCPCS

## 2022-03-31 NOTE — HOME HEALTH
Pt. tolerated OT session well. Pt. participated in 31 mins of theraputic exercises. Pt. continues to demonstrate understanding of HEP and be complaint with exercises. Continued OT per POC.

## 2022-04-04 ENCOUNTER — HOME CARE VISIT (OUTPATIENT)
Dept: HOME HEALTH SERVICES | Facility: HOME HEALTHCARE | Age: 72
End: 2022-04-04

## 2022-04-07 ENCOUNTER — HOME CARE VISIT (OUTPATIENT)
Dept: HOME HEALTH SERVICES | Facility: HOME HEALTHCARE | Age: 72
End: 2022-04-07

## 2022-04-07 VITALS — SYSTOLIC BLOOD PRESSURE: 113 MMHG | OXYGEN SATURATION: 96 % | HEART RATE: 92 BPM | DIASTOLIC BLOOD PRESSURE: 63 MMHG

## 2022-04-07 PROCEDURE — G0152 HHCP-SERV OF OT,EA 15 MIN: HCPCS

## 2022-04-08 NOTE — HOME HEALTH
Pt. tolerated OT session well. Pt has met all home health OT goals and is being dicharged from OT at this time. Pt. is progressing to outpatient therapy per her surgeon protocol.

## 2024-01-02 ENCOUNTER — PRE-ADMISSION TESTING (OUTPATIENT)
Dept: PREADMISSION TESTING | Facility: HOSPITAL | Age: 74
End: 2024-01-02
Payer: MEDICARE

## 2024-01-02 ENCOUNTER — HOSPITAL ENCOUNTER (OUTPATIENT)
Dept: GENERAL RADIOLOGY | Facility: HOSPITAL | Age: 74
Discharge: HOME | End: 2024-01-02
Payer: MEDICARE

## 2024-01-02 VITALS — WEIGHT: 161.82 LBS | BODY MASS INDEX: 27.63 KG/M2 | HEIGHT: 64 IN

## 2024-01-02 DIAGNOSIS — Z01.89 LABORATORY TEST: Primary | ICD-10-CM

## 2024-01-02 LAB
ABO GROUP BLD: NORMAL
ALBUMIN SERPL-MCNC: 4.3 G/DL (ref 3.5–5.2)
ALBUMIN/GLOB SERPL: 1.7 G/DL
ALP SERPL-CCNC: 71 U/L (ref 39–117)
ALT SERPL W P-5'-P-CCNC: 11 U/L (ref 1–33)
ANION GAP SERPL CALCULATED.3IONS-SCNC: 9 MMOL/L (ref 5–15)
AST SERPL-CCNC: 19 U/L (ref 1–32)
BILIRUB SERPL-MCNC: 0.3 MG/DL (ref 0–1.2)
BUN SERPL-MCNC: 11 MG/DL (ref 8–23)
BUN/CREAT SERPL: 14.5 (ref 7–25)
CALCIUM SPEC-SCNC: 9.4 MG/DL (ref 8.6–10.5)
CHLORIDE SERPL-SCNC: 100 MMOL/L (ref 98–107)
CO2 SERPL-SCNC: 28 MMOL/L (ref 22–29)
CREAT SERPL-MCNC: 0.76 MG/DL (ref 0.57–1)
DEPRECATED RDW RBC AUTO: 42.9 FL (ref 37–54)
EGFRCR SERPLBLD CKD-EPI 2021: 82.9 ML/MIN/1.73
ERYTHROCYTE [DISTWIDTH] IN BLOOD BY AUTOMATED COUNT: 12.4 % (ref 12.3–15.4)
GLOBULIN UR ELPH-MCNC: 2.6 GM/DL
GLUCOSE SERPL-MCNC: 111 MG/DL (ref 65–99)
HBA1C MFR BLD: 5.3 % (ref 4.8–5.6)
HCT VFR BLD AUTO: 43.9 % (ref 34–46.6)
HGB BLD-MCNC: 14 G/DL (ref 12–15.9)
MCH RBC QN AUTO: 30 PG (ref 26.6–33)
MCHC RBC AUTO-ENTMCNC: 31.9 G/DL (ref 31.5–35.7)
MCV RBC AUTO: 94 FL (ref 79–97)
PLATELET # BLD AUTO: 296 10*3/MM3 (ref 140–450)
PMV BLD AUTO: 9.5 FL (ref 6–12)
POTASSIUM SERPL-SCNC: 4 MMOL/L (ref 3.5–5.2)
PROT SERPL-MCNC: 6.9 G/DL (ref 6–8.5)
QT INTERVAL: 348 MS
QTC INTERVAL: 370 MS
RBC # BLD AUTO: 4.67 10*6/MM3 (ref 3.77–5.28)
RH BLD: POSITIVE
SODIUM SERPL-SCNC: 137 MMOL/L (ref 136–145)
WBC NRBC COR # BLD AUTO: 10.6 10*3/MM3 (ref 3.4–10.8)

## 2024-01-02 PROCEDURE — 86900 BLOOD TYPING SEROLOGIC ABO: CPT

## 2024-01-02 PROCEDURE — 85027 COMPLETE CBC AUTOMATED: CPT

## 2024-01-02 PROCEDURE — 86901 BLOOD TYPING SEROLOGIC RH(D): CPT

## 2024-01-02 PROCEDURE — 80053 COMPREHEN METABOLIC PANEL: CPT

## 2024-01-02 PROCEDURE — 71046 X-RAY EXAM CHEST 2 VIEWS: CPT

## 2024-01-02 PROCEDURE — 83036 HEMOGLOBIN GLYCOSYLATED A1C: CPT

## 2024-01-02 PROCEDURE — 93005 ELECTROCARDIOGRAM TRACING: CPT

## 2024-01-02 PROCEDURE — 36415 COLL VENOUS BLD VENIPUNCTURE: CPT

## 2024-01-02 RX ORDER — ROSUVASTATIN CALCIUM 5 MG/1
5 TABLET, COATED ORAL NIGHTLY
COMMUNITY

## 2024-01-02 RX ORDER — ALBUTEROL SULFATE 90 UG/1
1 AEROSOL, METERED RESPIRATORY (INHALATION) DAILY
COMMUNITY

## 2024-01-02 NOTE — PAT
An arrival time for procedure was not provided during PAT visit. If patient had any questions or concerns about their arrival time, they were instructed to contact their surgeon/physician.  Additionally, if the patient referred to an arrival time that was acquired from their my chart account, patient was encouraged to verify that time with their surgeon/physician. Arrival times are NOT provided in Pre Admission Testing Department.    Patient viewed general PAT education video as instructed in their preoperative information received from their surgeon.  Patient stated the general PAT education video was viewed in its entirety and survey completed.  Copies of PAT general education handouts (Incentive Spirometry, Meds to Beds Program, Patient Belongings, Pre-op skin preparation instructions, Blood Glucose testing, Visitor policy, Surgery FAQ, Code H) distributed to patient if not printed. Education related to the PAT pass and skin preparation for surgery (if applicable) completed in PAT as a reinforcement to PAT education video. Patient instructed to return PAT pass provided today as well as completed skin preparation sheet (if applicable) on the day of procedure.     Additionally if patient had not viewed video yet but intended to view it at home or in our waiting area, then referred them to the handout with QR code/link provided during PAT visit.  Instructed patient to complete survey after viewing the video in its entirety.  Encouraged patient/family to read PAT general education handouts thoroughly and notify PAT staff with any questions or concerns. Patient verbalized understanding of all information and priority content.    Patient denies any current skin issues.     Patient instructed to drink 20 ounces of Gatorade or Gatorlyte (if diabetic) and it needs to be completed 1 hour (for Main OR patients) or 2 hours (scheduled  section & [facility] patients) before given arrival time for procedure (NO RED  "Gatorade and NO Gatorade Zero).    Patient verbalized understanding.    Ten (8 ounce) Impact Advanced Recovery Nutritional Drinks distributed to patient from Pre Admission Testing Department.  Verbal and written instructions given that patient must consume two (8 ounce) Impact drinks a day for five days before surgery.  Flavoring tip sheet provided as well the brochure \"Go in Stronger. Get Home Sooner\" that explains benefits of nutritional drinks to enhance recovery. Patient/family verbalized understanding.     It was noted during Pre Admission Testing that patient was wearing some form of fingernail polish (gel/regular) and/or acrylic/artificial nails.  Patient was told that polish and/or artificial nails must be removed for surgery.  If a patient had recent manicure, and would rather not remove polish or artificial nails. Then the minimum requirement is that the polish/artificial nails must be removed from the middle finger on each hand.  Patient verbalized understanding.    If patient was having surgery on an upper extremity, then the patient was instructed that fingernail polish/artificial fingernails must be removed for surgery.  NO EXCEPTIONS.  Patient verbalized understanding.    If patient was having surgery on a lower extremity, then the patient was instructed that toenail polish on both extremities must be removed for surgery.  NO EXCEPTIONS. Patient verbalized understanding.    Too early to draw type and screen in PAT.  Please obtain blood bank specimen in pre-op on the day of surgery.    Patient to apply Chlorhexadine wipes  to surgical area (as instructed) the night before procedure and the AM of procedure. Wipes provided.    Patient directed to Radiology Department for CXR after Pre Admission Testing Appointment.     "

## 2024-01-03 ENCOUNTER — PREP FOR SURGERY (OUTPATIENT)
Dept: OTHER | Facility: HOSPITAL | Age: 74
End: 2024-01-03
Payer: MEDICARE

## 2024-01-08 ENCOUNTER — ANESTHESIA EVENT (OUTPATIENT)
Dept: PERIOP | Facility: HOSPITAL | Age: 74
End: 2024-01-08
Payer: MEDICARE

## 2024-01-08 RX ORDER — FAMOTIDINE 10 MG/ML
20 INJECTION, SOLUTION INTRAVENOUS ONCE
Status: CANCELLED | OUTPATIENT
Start: 2024-01-08 | End: 2024-01-08

## 2024-01-09 ENCOUNTER — ANESTHESIA EVENT CONVERTED (OUTPATIENT)
Dept: ANESTHESIOLOGY | Facility: HOSPITAL | Age: 74
End: 2024-01-09
Payer: MEDICARE

## 2024-01-09 ENCOUNTER — HOSPITAL ENCOUNTER (INPATIENT)
Facility: HOSPITAL | Age: 74
LOS: 2 days | Discharge: HOME | End: 2024-01-11
Attending: COLON & RECTAL SURGERY | Admitting: COLON & RECTAL SURGERY
Payer: MEDICARE

## 2024-01-09 ENCOUNTER — ANESTHESIA (OUTPATIENT)
Dept: PERIOP | Facility: HOSPITAL | Age: 74
End: 2024-01-09
Payer: MEDICARE

## 2024-01-09 DIAGNOSIS — Z90.49 STATUS POST LAPAROSCOPIC COLECTOMY: Primary | ICD-10-CM

## 2024-01-09 DIAGNOSIS — N82.4 COLOVAGINAL FISTULA: ICD-10-CM

## 2024-01-09 PROBLEM — E78.5 HYPERLIPIDEMIA: Status: ACTIVE | Noted: 2024-01-09

## 2024-01-09 LAB
ABO GROUP BLD: NORMAL
BLD GP AB SCN SERPL QL: NEGATIVE
GLUCOSE BLDC GLUCOMTR-MCNC: 121 MG/DL (ref 70–130)
GLUCOSE BLDC GLUCOMTR-MCNC: 266 MG/DL (ref 70–130)
HCT VFR BLD AUTO: 38 % (ref 34–46.6)
HGB BLD-MCNC: 12.1 G/DL (ref 12–15.9)
RH BLD: POSITIVE
T&S EXPIRATION DATE: NORMAL

## 2024-01-09 PROCEDURE — 25010000002 INDOCYANINE GREEN 25 MG RECONSTITUTED SOLUTION: Performed by: COLON & RECTAL SURGERY

## 2024-01-09 PROCEDURE — 25010000002 DEXAMETHASONE PER 1 MG: Performed by: NURSE ANESTHETIST, CERTIFIED REGISTERED

## 2024-01-09 PROCEDURE — 0DBNFZZ EXCISION OF SIGMOID COLON, VIA NATURAL OR ARTIFICIAL OPENING WITH PERCUTANEOUS ENDOSCOPIC ASSISTANCE: ICD-10-PCS | Performed by: COLON & RECTAL SURGERY

## 2024-01-09 PROCEDURE — 0DJD8ZZ INSPECTION OF LOWER INTESTINAL TRACT, VIA NATURAL OR ARTIFICIAL OPENING ENDOSCOPIC: ICD-10-PCS | Performed by: COLON & RECTAL SURGERY

## 2024-01-09 PROCEDURE — 86900 BLOOD TYPING SEROLOGIC ABO: CPT | Performed by: COLON & RECTAL SURGERY

## 2024-01-09 PROCEDURE — 25010000002 SUGAMMADEX 200 MG/2ML SOLUTION: Performed by: NURSE ANESTHETIST, CERTIFIED REGISTERED

## 2024-01-09 PROCEDURE — 86850 RBC ANTIBODY SCREEN: CPT | Performed by: COLON & RECTAL SURGERY

## 2024-01-09 PROCEDURE — 25010000002 ERTAPENEM PER 500 MG: Performed by: COLON & RECTAL SURGERY

## 2024-01-09 PROCEDURE — 25810000003 LACTATED RINGERS PER 1000 ML: Performed by: ANESTHESIOLOGY

## 2024-01-09 PROCEDURE — 25010000002 HYDROMORPHONE 1 MG/ML SOLUTION

## 2024-01-09 PROCEDURE — C1769 GUIDE WIRE: HCPCS | Performed by: COLON & RECTAL SURGERY

## 2024-01-09 PROCEDURE — 0DNW4ZZ RELEASE PERITONEUM, PERCUTANEOUS ENDOSCOPIC APPROACH: ICD-10-PCS | Performed by: COLON & RECTAL SURGERY

## 2024-01-09 PROCEDURE — 86901 BLOOD TYPING SEROLOGIC RH(D): CPT | Performed by: COLON & RECTAL SURGERY

## 2024-01-09 PROCEDURE — 0UQG4ZZ REPAIR VAGINA, PERCUTANEOUS ENDOSCOPIC APPROACH: ICD-10-PCS | Performed by: COLON & RECTAL SURGERY

## 2024-01-09 PROCEDURE — 25010000002 BUPIVACAINE (PF) 0.25 % SOLUTION: Performed by: NURSE ANESTHETIST, CERTIFIED REGISTERED

## 2024-01-09 PROCEDURE — 63710000001 INSULIN LISPRO (HUMAN) PER 5 UNITS: Performed by: COLON & RECTAL SURGERY

## 2024-01-09 PROCEDURE — 25010000002 HEPARIN (PORCINE) PER 1000 UNITS: Performed by: COLON & RECTAL SURGERY

## 2024-01-09 PROCEDURE — 25010000002 DEXAMETHASONE SODIUM PHOSPHATE 10 MG/ML SOLUTION: Performed by: NURSE ANESTHETIST, CERTIFIED REGISTERED

## 2024-01-09 PROCEDURE — 25810000003 SODIUM CHLORIDE PER 500 ML: Performed by: COLON & RECTAL SURGERY

## 2024-01-09 PROCEDURE — 85018 HEMOGLOBIN: CPT | Performed by: COLON & RECTAL SURGERY

## 2024-01-09 PROCEDURE — 93005 ELECTROCARDIOGRAM TRACING: CPT | Performed by: COLON & RECTAL SURGERY

## 2024-01-09 PROCEDURE — 0T788DZ DILATION OF BILATERAL URETERS WITH INTRALUMINAL DEVICE, VIA NATURAL OR ARTIFICIAL OPENING ENDOSCOPIC: ICD-10-PCS | Performed by: UROLOGY

## 2024-01-09 PROCEDURE — 52332 CYSTOSCOPY AND TREATMENT: CPT | Performed by: UROLOGY

## 2024-01-09 PROCEDURE — 25010000002 FENTANYL CITRATE (PF) 50 MCG/ML SOLUTION

## 2024-01-09 PROCEDURE — C1758 CATHETER, URETERAL: HCPCS | Performed by: COLON & RECTAL SURGERY

## 2024-01-09 PROCEDURE — 85014 HEMATOCRIT: CPT | Performed by: COLON & RECTAL SURGERY

## 2024-01-09 PROCEDURE — 8E0W4CZ ROBOTIC ASSISTED PROCEDURE OF TRUNK REGION, PERCUTANEOUS ENDOSCOPIC APPROACH: ICD-10-PCS | Performed by: COLON & RECTAL SURGERY

## 2024-01-09 PROCEDURE — 25010000002 PROPOFOL 10 MG/ML EMULSION: Performed by: NURSE ANESTHETIST, CERTIFIED REGISTERED

## 2024-01-09 PROCEDURE — 25010000002 FENTANYL CITRATE (PF) 100 MCG/2ML SOLUTION: Performed by: NURSE ANESTHETIST, CERTIFIED REGISTERED

## 2024-01-09 PROCEDURE — 25010000002 MORPHINE PER 10 MG: Performed by: COLON & RECTAL SURGERY

## 2024-01-09 PROCEDURE — 25810000003 LACTATED RINGERS PER 1000 ML: Performed by: NURSE ANESTHETIST, CERTIFIED REGISTERED

## 2024-01-09 PROCEDURE — 88307 TISSUE EXAM BY PATHOLOGIST: CPT | Performed by: COLON & RECTAL SURGERY

## 2024-01-09 PROCEDURE — 93010 ELECTROCARDIOGRAM REPORT: CPT | Performed by: INTERNAL MEDICINE

## 2024-01-09 PROCEDURE — 25010000002 INDOCYANINE GREEN 25 MG RECONSTITUTED SOLUTION: Performed by: NURSE ANESTHETIST, CERTIFIED REGISTERED

## 2024-01-09 PROCEDURE — 82948 REAGENT STRIP/BLOOD GLUCOSE: CPT

## 2024-01-09 PROCEDURE — 25010000002 ONDANSETRON PER 1 MG: Performed by: NURSE ANESTHETIST, CERTIFIED REGISTERED

## 2024-01-09 DEVICE — ECHELON CIRCULAR POWERED STAPLER
Type: IMPLANTABLE DEVICE | Site: SIGMOID COLON | Status: FUNCTIONAL
Brand: ECHELON CIRCULAR

## 2024-01-09 DEVICE — DEV CONTRL TISS STRATAFIX SPIRAL PDS PLS 3/0 0 15CM VIL: Type: IMPLANTABLE DEVICE | Site: SIGMOID COLON | Status: FUNCTIONAL

## 2024-01-09 RX ORDER — TRAMADOL HYDROCHLORIDE 50 MG/1
50 TABLET ORAL EVERY 6 HOURS PRN
Status: DISCONTINUED | OUTPATIENT
Start: 2024-01-09 | End: 2024-01-11 | Stop reason: HOSPADM

## 2024-01-09 RX ORDER — DEXTROSE MONOHYDRATE, SODIUM CHLORIDE, AND POTASSIUM CHLORIDE 50; 1.49; 4.5 G/1000ML; G/1000ML; G/1000ML
100 INJECTION, SOLUTION INTRAVENOUS CONTINUOUS
Status: DISCONTINUED | OUTPATIENT
Start: 2024-01-09 | End: 2024-01-10

## 2024-01-09 RX ORDER — ENOXAPARIN SODIUM 100 MG/ML
40 INJECTION SUBCUTANEOUS DAILY
Status: DISCONTINUED | OUTPATIENT
Start: 2024-01-10 | End: 2024-01-11 | Stop reason: HOSPADM

## 2024-01-09 RX ORDER — ROCURONIUM BROMIDE 10 MG/ML
INJECTION, SOLUTION INTRAVENOUS AS NEEDED
Status: DISCONTINUED | OUTPATIENT
Start: 2024-01-09 | End: 2024-01-09 | Stop reason: SURG

## 2024-01-09 RX ORDER — DEXAMETHASONE SODIUM PHOSPHATE 4 MG/ML
INJECTION, SOLUTION INTRA-ARTICULAR; INTRALESIONAL; INTRAMUSCULAR; INTRAVENOUS; SOFT TISSUE AS NEEDED
Status: DISCONTINUED | OUTPATIENT
Start: 2024-01-09 | End: 2024-01-09 | Stop reason: SURG

## 2024-01-09 RX ORDER — HEPARIN SODIUM 5000 [USP'U]/ML
5000 INJECTION, SOLUTION INTRAVENOUS; SUBCUTANEOUS ONCE
Status: COMPLETED | OUTPATIENT
Start: 2024-01-09 | End: 2024-01-09

## 2024-01-09 RX ORDER — NALOXONE HCL 0.4 MG/ML
0.4 VIAL (ML) INJECTION
Status: DISCONTINUED | OUTPATIENT
Start: 2024-01-09 | End: 2024-01-11 | Stop reason: HOSPADM

## 2024-01-09 RX ORDER — DIAZEPAM 5 MG/1
5 TABLET ORAL EVERY 6 HOURS PRN
Status: DISCONTINUED | OUTPATIENT
Start: 2024-01-09 | End: 2024-01-11 | Stop reason: HOSPADM

## 2024-01-09 RX ORDER — MAGNESIUM HYDROXIDE 1200 MG/15ML
LIQUID ORAL AS NEEDED
Status: DISCONTINUED | OUTPATIENT
Start: 2024-01-09 | End: 2024-01-09 | Stop reason: HOSPADM

## 2024-01-09 RX ORDER — BUPIVACAINE HYDROCHLORIDE 2.5 MG/ML
INJECTION, SOLUTION EPIDURAL; INFILTRATION; INTRACAUDAL
Status: COMPLETED | OUTPATIENT
Start: 2024-01-09 | End: 2024-01-09

## 2024-01-09 RX ORDER — ONDANSETRON 2 MG/ML
4 INJECTION INTRAMUSCULAR; INTRAVENOUS EVERY 6 HOURS PRN
Status: DISCONTINUED | OUTPATIENT
Start: 2024-01-09 | End: 2024-01-11 | Stop reason: HOSPADM

## 2024-01-09 RX ORDER — ALVIMOPAN 12 MG/1
12 CAPSULE ORAL 2 TIMES DAILY
Status: DISCONTINUED | OUTPATIENT
Start: 2024-01-10 | End: 2024-01-09

## 2024-01-09 RX ORDER — MIDAZOLAM HYDROCHLORIDE 1 MG/ML
0.5 INJECTION INTRAMUSCULAR; INTRAVENOUS
Status: DISCONTINUED | OUTPATIENT
Start: 2024-01-09 | End: 2024-01-09 | Stop reason: HOSPADM

## 2024-01-09 RX ORDER — ROSUVASTATIN CALCIUM 10 MG/1
5 TABLET, COATED ORAL NIGHTLY
Status: DISCONTINUED | OUTPATIENT
Start: 2024-01-09 | End: 2024-01-11 | Stop reason: HOSPADM

## 2024-01-09 RX ORDER — ALVIMOPAN 12 MG/1
12 CAPSULE ORAL ONCE
Status: COMPLETED | OUTPATIENT
Start: 2024-01-09 | End: 2024-01-09

## 2024-01-09 RX ORDER — INDOCYANINE GREEN AND WATER 25 MG
KIT INJECTION AS NEEDED
Status: DISCONTINUED | OUTPATIENT
Start: 2024-01-09 | End: 2024-01-09 | Stop reason: HOSPADM

## 2024-01-09 RX ORDER — SODIUM CHLORIDE 0.9 % (FLUSH) 0.9 %
10 SYRINGE (ML) INJECTION AS NEEDED
Status: DISCONTINUED | OUTPATIENT
Start: 2024-01-09 | End: 2024-01-09 | Stop reason: HOSPADM

## 2024-01-09 RX ORDER — SODIUM CHLORIDE, SODIUM LACTATE, POTASSIUM CHLORIDE, CALCIUM CHLORIDE 600; 310; 30; 20 MG/100ML; MG/100ML; MG/100ML; MG/100ML
INJECTION, SOLUTION INTRAVENOUS CONTINUOUS PRN
Status: DISCONTINUED | OUTPATIENT
Start: 2024-01-09 | End: 2024-01-09 | Stop reason: SURG

## 2024-01-09 RX ORDER — IBUPROFEN 600 MG/1
1 TABLET ORAL
Status: DISCONTINUED | OUTPATIENT
Start: 2024-01-09 | End: 2024-01-11 | Stop reason: HOSPADM

## 2024-01-09 RX ORDER — PREGABALIN 75 MG/1
75 CAPSULE ORAL ONCE
Status: COMPLETED | OUTPATIENT
Start: 2024-01-09 | End: 2024-01-09

## 2024-01-09 RX ORDER — DEXAMETHASONE SODIUM PHOSPHATE 10 MG/ML
INJECTION, SOLUTION INTRAMUSCULAR; INTRAVENOUS
Status: COMPLETED | OUTPATIENT
Start: 2024-01-09 | End: 2024-01-09

## 2024-01-09 RX ORDER — SODIUM CHLORIDE 0.9 % (FLUSH) 0.9 %
10 SYRINGE (ML) INJECTION EVERY 12 HOURS SCHEDULED
Status: DISCONTINUED | OUTPATIENT
Start: 2024-01-09 | End: 2024-01-09 | Stop reason: HOSPADM

## 2024-01-09 RX ORDER — INDOCYANINE GREEN AND WATER 25 MG
KIT INJECTION AS NEEDED
Status: DISCONTINUED | OUTPATIENT
Start: 2024-01-09 | End: 2024-01-09 | Stop reason: SURG

## 2024-01-09 RX ORDER — ACETAMINOPHEN 500 MG
1000 TABLET ORAL ONCE
Status: COMPLETED | OUTPATIENT
Start: 2024-01-09 | End: 2024-01-09

## 2024-01-09 RX ORDER — ONDANSETRON 2 MG/ML
INJECTION INTRAMUSCULAR; INTRAVENOUS AS NEEDED
Status: DISCONTINUED | OUTPATIENT
Start: 2024-01-09 | End: 2024-01-09 | Stop reason: SURG

## 2024-01-09 RX ORDER — NICOTINE POLACRILEX 4 MG
15 LOZENGE BUCCAL
Status: DISCONTINUED | OUTPATIENT
Start: 2024-01-09 | End: 2024-01-11 | Stop reason: HOSPADM

## 2024-01-09 RX ORDER — PROPOFOL 10 MG/ML
VIAL (ML) INTRAVENOUS AS NEEDED
Status: DISCONTINUED | OUTPATIENT
Start: 2024-01-09 | End: 2024-01-09 | Stop reason: SURG

## 2024-01-09 RX ORDER — HYDROMORPHONE HYDROCHLORIDE 1 MG/ML
0.5 INJECTION, SOLUTION INTRAMUSCULAR; INTRAVENOUS; SUBCUTANEOUS
Status: DISCONTINUED | OUTPATIENT
Start: 2024-01-09 | End: 2024-01-09

## 2024-01-09 RX ORDER — ACETAMINOPHEN 500 MG
1000 TABLET ORAL EVERY 6 HOURS
Status: DISPENSED | OUTPATIENT
Start: 2024-01-09 | End: 2024-01-11

## 2024-01-09 RX ORDER — SODIUM CHLORIDE 9 MG/ML
INJECTION, SOLUTION INTRAVENOUS AS NEEDED
Status: DISCONTINUED | OUTPATIENT
Start: 2024-01-09 | End: 2024-01-09 | Stop reason: HOSPADM

## 2024-01-09 RX ORDER — LIDOCAINE HYDROCHLORIDE 10 MG/ML
INJECTION, SOLUTION EPIDURAL; INFILTRATION; INTRACAUDAL; PERINEURAL AS NEEDED
Status: DISCONTINUED | OUTPATIENT
Start: 2024-01-09 | End: 2024-01-09 | Stop reason: SURG

## 2024-01-09 RX ORDER — FAMOTIDINE 20 MG/1
20 TABLET, FILM COATED ORAL ONCE
Status: COMPLETED | OUTPATIENT
Start: 2024-01-09 | End: 2024-01-09

## 2024-01-09 RX ORDER — ACETAMINOPHEN 500 MG
1000 TABLET ORAL EVERY 6 HOURS PRN
Status: DISCONTINUED | OUTPATIENT
Start: 2024-01-09 | End: 2024-01-11 | Stop reason: HOSPADM

## 2024-01-09 RX ORDER — LABETALOL HYDROCHLORIDE 5 MG/ML
10 INJECTION, SOLUTION INTRAVENOUS EVERY 4 HOURS PRN
Status: DISCONTINUED | OUTPATIENT
Start: 2024-01-09 | End: 2024-01-11 | Stop reason: HOSPADM

## 2024-01-09 RX ORDER — MORPHINE SULFATE 2 MG/ML
2 INJECTION, SOLUTION INTRAMUSCULAR; INTRAVENOUS EVERY 4 HOURS PRN
Status: DISCONTINUED | OUTPATIENT
Start: 2024-01-09 | End: 2024-01-11 | Stop reason: HOSPADM

## 2024-01-09 RX ORDER — DEXTROSE MONOHYDRATE 25 G/50ML
25 INJECTION, SOLUTION INTRAVENOUS
Status: DISCONTINUED | OUTPATIENT
Start: 2024-01-09 | End: 2024-01-11 | Stop reason: HOSPADM

## 2024-01-09 RX ORDER — ALBUTEROL SULFATE 90 UG/1
1 AEROSOL, METERED RESPIRATORY (INHALATION) DAILY
Status: DISCONTINUED | OUTPATIENT
Start: 2024-01-09 | End: 2024-01-10

## 2024-01-09 RX ORDER — FENTANYL CITRATE 50 UG/ML
50 INJECTION, SOLUTION INTRAMUSCULAR; INTRAVENOUS
Status: DISCONTINUED | OUTPATIENT
Start: 2024-01-09 | End: 2024-01-09

## 2024-01-09 RX ORDER — PHENYLEPHRINE HCL IN 0.9% NACL 1 MG/10 ML
SYRINGE (ML) INTRAVENOUS AS NEEDED
Status: DISCONTINUED | OUTPATIENT
Start: 2024-01-09 | End: 2024-01-09 | Stop reason: SURG

## 2024-01-09 RX ORDER — SODIUM CHLORIDE, SODIUM LACTATE, POTASSIUM CHLORIDE, CALCIUM CHLORIDE 600; 310; 30; 20 MG/100ML; MG/100ML; MG/100ML; MG/100ML
9 INJECTION, SOLUTION INTRAVENOUS CONTINUOUS
Status: DISCONTINUED | OUTPATIENT
Start: 2024-01-09 | End: 2024-01-09

## 2024-01-09 RX ORDER — LIDOCAINE HYDROCHLORIDE 10 MG/ML
0.5 INJECTION, SOLUTION EPIDURAL; INFILTRATION; INTRACAUDAL; PERINEURAL ONCE AS NEEDED
Status: COMPLETED | OUTPATIENT
Start: 2024-01-09 | End: 2024-01-09

## 2024-01-09 RX ORDER — INSULIN LISPRO 100 [IU]/ML
0-7 INJECTION, SOLUTION INTRAVENOUS; SUBCUTANEOUS
Status: DISCONTINUED | OUTPATIENT
Start: 2024-01-09 | End: 2024-01-11 | Stop reason: HOSPADM

## 2024-01-09 RX ORDER — MAGNESIUM OXIDE 400 MG/1
400 TABLET ORAL DAILY
COMMUNITY

## 2024-01-09 RX ORDER — FENTANYL CITRATE 50 UG/ML
INJECTION, SOLUTION INTRAMUSCULAR; INTRAVENOUS
Status: COMPLETED
Start: 2024-01-09 | End: 2024-01-09

## 2024-01-09 RX ORDER — SODIUM CHLORIDE 9 MG/ML
40 INJECTION, SOLUTION INTRAVENOUS AS NEEDED
Status: DISCONTINUED | OUTPATIENT
Start: 2024-01-09 | End: 2024-01-09 | Stop reason: HOSPADM

## 2024-01-09 RX ORDER — FENTANYL CITRATE 50 UG/ML
INJECTION, SOLUTION INTRAMUSCULAR; INTRAVENOUS AS NEEDED
Status: DISCONTINUED | OUTPATIENT
Start: 2024-01-09 | End: 2024-01-09 | Stop reason: SURG

## 2024-01-09 RX ADMIN — DEXAMETHASONE SODIUM PHOSPHATE 8 MG: 4 INJECTION, SOLUTION INTRAMUSCULAR; INTRAVENOUS at 07:46

## 2024-01-09 RX ADMIN — ALVIMOPAN 12 MG: 12 CAPSULE ORAL at 07:23

## 2024-01-09 RX ADMIN — ACETAMINOPHEN 1000 MG: 500 TABLET ORAL at 07:15

## 2024-01-09 RX ADMIN — HYDROMORPHONE HYDROCHLORIDE 0.5 MG: 1 INJECTION, SOLUTION INTRAMUSCULAR; INTRAVENOUS; SUBCUTANEOUS at 10:35

## 2024-01-09 RX ADMIN — POTASSIUM CHLORIDE, DEXTROSE MONOHYDRATE AND SODIUM CHLORIDE 100 ML/HR: 150; 5; 450 INJECTION, SOLUTION INTRAVENOUS at 15:41

## 2024-01-09 RX ADMIN — SODIUM CHLORIDE, POTASSIUM CHLORIDE, SODIUM LACTATE AND CALCIUM CHLORIDE: 600; 310; 30; 20 INJECTION, SOLUTION INTRAVENOUS at 07:31

## 2024-01-09 RX ADMIN — DEXAMETHASONE SODIUM PHOSPHATE 4 MG: 10 INJECTION, SOLUTION INTRAMUSCULAR; INTRAVENOUS at 07:41

## 2024-01-09 RX ADMIN — INSULIN LISPRO 4 UNITS: 100 INJECTION, SOLUTION INTRAVENOUS; SUBCUTANEOUS at 18:27

## 2024-01-09 RX ADMIN — FENTANYL CITRATE 100 MCG: 50 INJECTION, SOLUTION INTRAMUSCULAR; INTRAVENOUS at 07:33

## 2024-01-09 RX ADMIN — PROPOFOL 150 MG: 10 INJECTION, EMULSION INTRAVENOUS at 07:35

## 2024-01-09 RX ADMIN — SODIUM CHLORIDE, POTASSIUM CHLORIDE, SODIUM LACTATE AND CALCIUM CHLORIDE: 600; 310; 30; 20 INJECTION, SOLUTION INTRAVENOUS at 10:02

## 2024-01-09 RX ADMIN — HYDROMORPHONE HYDROCHLORIDE 0.5 MG: 1 INJECTION, SOLUTION INTRAMUSCULAR; INTRAVENOUS; SUBCUTANEOUS at 13:08

## 2024-01-09 RX ADMIN — HYDROMORPHONE HYDROCHLORIDE 0.5 MG: 1 INJECTION, SOLUTION INTRAMUSCULAR; INTRAVENOUS; SUBCUTANEOUS at 12:09

## 2024-01-09 RX ADMIN — FAMOTIDINE 20 MG: 20 TABLET, FILM COATED ORAL at 07:15

## 2024-01-09 RX ADMIN — LIDOCAINE HYDROCHLORIDE 0.5 ML: 10 INJECTION, SOLUTION EPIDURAL; INFILTRATION; INTRACAUDAL; PERINEURAL at 07:16

## 2024-01-09 RX ADMIN — HEPARIN SODIUM 5000 UNITS: 5000 INJECTION, SOLUTION INTRAVENOUS; SUBCUTANEOUS at 07:15

## 2024-01-09 RX ADMIN — PREGABALIN 75 MG: 75 CAPSULE ORAL at 07:15

## 2024-01-09 RX ADMIN — ACETAMINOPHEN 1000 MG: 500 TABLET ORAL at 15:41

## 2024-01-09 RX ADMIN — ROCURONIUM BROMIDE 40 MG: 10 SOLUTION INTRAVENOUS at 07:35

## 2024-01-09 RX ADMIN — LIDOCAINE HYDROCHLORIDE 50 MG: 10 INJECTION, SOLUTION EPIDURAL; INFILTRATION; INTRACAUDAL; PERINEURAL at 07:35

## 2024-01-09 RX ADMIN — INDOCYANINE GREEN AND WATER 12.5 MG: KIT at 08:51

## 2024-01-09 RX ADMIN — FENTANYL CITRATE 50 MCG: 50 INJECTION, SOLUTION INTRAMUSCULAR; INTRAVENOUS at 10:23

## 2024-01-09 RX ADMIN — Medication 100 MCG: at 07:40

## 2024-01-09 RX ADMIN — BUPIVACAINE HYDROCHLORIDE 60 ML: 2.5 INJECTION, SOLUTION EPIDURAL; INFILTRATION; INTRACAUDAL; PERINEURAL at 07:41

## 2024-01-09 RX ADMIN — MORPHINE SULFATE 2 MG: 2 INJECTION, SOLUTION INTRAMUSCULAR; INTRAVENOUS at 23:14

## 2024-01-09 RX ADMIN — ACETAMINOPHEN 1000 MG: 500 TABLET ORAL at 21:33

## 2024-01-09 RX ADMIN — SODIUM CHLORIDE, POTASSIUM CHLORIDE, SODIUM LACTATE AND CALCIUM CHLORIDE 9 ML/HR: 600; 310; 30; 20 INJECTION, SOLUTION INTRAVENOUS at 07:16

## 2024-01-09 RX ADMIN — ROSUVASTATIN 5 MG: 10 TABLET, FILM COATED ORAL at 21:33

## 2024-01-09 RX ADMIN — ERTAPENEM SODIUM 1000 MG: 1 INJECTION, POWDER, LYOPHILIZED, FOR SOLUTION INTRAMUSCULAR; INTRAVENOUS at 08:06

## 2024-01-09 RX ADMIN — ONDANSETRON 4 MG: 2 INJECTION INTRAMUSCULAR; INTRAVENOUS at 10:07

## 2024-01-09 RX ADMIN — SUGAMMADEX 150 MG: 100 INJECTION, SOLUTION INTRAVENOUS at 10:09

## 2024-01-09 NOTE — ANESTHESIA PREPROCEDURE EVALUATION
Anesthesia Evaluation     Patient summary reviewed and Nursing notes reviewed   no history of anesthetic complications:   NPO Solid Status: > 8 hours  NPO Liquid Status: > 2 hours           Airway   Mallampati: II  TM distance: >3 FB  Neck ROM: full  No difficulty expected  Comment: H/o intubation: Dlx1, meyers 2, grade 1 view  Dental    (+) upper dentures    Pulmonary - normal exam   (+) asthma,  (-) sleep apnea  Cardiovascular - normal exam  Exercise tolerance: good (4-7 METS)    ECG reviewed    (+) hyperlipidemia  (-) dysrhythmias, angina, orthopnea, CARABALLO      Neuro/Psych  (-) seizures, CVA  GI/Hepatic/Renal/Endo    (-) liver disease, no renal disease, diabetes, no thyroid disorder    Musculoskeletal     Abdominal    Substance History      OB/GYN          Other   arthritis,                       Anesthesia Plan    ASA 3     general with block     (TAPs)  intravenous induction     Anesthetic plan, risks, benefits, and alternatives have been provided, discussed and informed consent has been obtained with: patient.    Use of blood products discussed with  Consented to blood products.    Plan discussed with CRNA.        CODE STATUS:

## 2024-01-09 NOTE — OP NOTE
Cystourethroscopy & Intraoperative Bilateral Catheter Placement Operative Report    Patient Name:  Racheal De La Vega  YOB: 1950    Date of Surgery:  1/9/2024     Indications:  73-year-old female with history of diverticulitis and colovaginal fistula.  She presents today for low anterior resection, urology consulted intraoperatively for ureteral identification catheter placement.    Pre-op Diagnosis:   Diverticulitis  Colovaginal fistula       Post-Op Diagnosis Codes:   Diverticulitis  Colovaginal fistula    Procedure/CPT® Codes: 17370, 33592    1. CYSTOSCOPY BILATERAL URETERAL CATHETER/STENT INSERTION  2. MODIFIER 50, BILATERAL STENT PLACEMENT  3. FALCON CATHETER INSERTION  CHANGE    Staff:  Surgeon(s):  Yordna Reynoso MD      Anesthesia: General with Block    Estimated Blood Loss: none    Implants:    Implant Name Type Inv. Item Serial No.  Lot No. LRB No. Used Action   DEV CONTRL TISS STRATAFIX SPIRAL PDS PLS 3/0 0 15CM DASIA - [id number] Implant DEV CONTRL TISS STRATAFIX SPIRAL PDS PLS 3/0 0 15CM DASIA  [employer]  DIV OF J AND J N/R N/A 2 Implanted   STPLR PWR ECHELONCIRCULAR CRV 3D PRELD 29MM - [id number] Implant STPLR PWR ECHELONCIRCULAR CRV 3D PRELD 29MM  [employer]  DIV OF J AND J [id number] N/A 1 Implanted       Specimen:          Specimens       ID Source Type Tests Collected By Collected At Frozen?    A Large Intestine, Sigmoid Colon Tissue TISSUE PATHOLOGY EXAM   Prachi Helton MD 1/9/24 0904     Description: SIGMOID, UPPER RECTUM, ANASTOMTIC RINGS    This specimen was not marked as sent.                Findings:   Normal urinary bladder  Insertion of bilateral 5 Chinese ureteral identification catheter.  Injection of ICG.  Insertion of 16 Chinese Falcon catheter    Complications: None immediate    Description of Procedure:   The patient was identified in the preoperative holding area where informed consent was reviewed and signed. The patient was transported to the  operating room per anesthesia and placed supine on the operating table. Smooth endotracheal intubation was performed without issue after administration of general anesthesia. The patient was then placed in the dorsal lithotomy position where genitals were prepped and draped in the usual sterile fashion. A brief timeout was performed identifying the correct patient procedure and laterality. Perioperative antibiotics were administered. All pressure points were padded.      Procedure began by inserting a 22 English cystoscope atraumatically per the patient's urethra. Upon entering the bladder formal cystoscopy was performed identifying bilateral orthotopic ureteral orifices and no evidence of tumor, stone, foreign body. Attention was then turned to the right ureteral orifice. A 5 English open ended ureteral catheter was inserted into the distal ureter and passed up to the level right renal collecting system without difficulty.  10 mL ICG instilled through the ureteral catheter.  Attention was then turned to the left ureteral orifice.  A 5 English open ended ureteral catheter was inserted into the distal ureter and passed up to the level left renal collecting system without difficulty.   10 mL ICG instilled through the ureteral catheter.      A 16F catheter was placed.  10 mL placed in the balloon.  The ureteral catheters were affixed to the Faust catheter.     Patient remained under general anesthesia.  The patient will continue scheduled procedure with colorectal service, Dr. Sunitha MD. Urology service available to assist in any way during the procedure.    Yordan Reynoso MD     Date: 1/9/2024  Time: 09:48 EST

## 2024-01-09 NOTE — ANESTHESIA PROCEDURE NOTES
"Peripheral Block      Patient reassessed immediately prior to procedure    Patient location during procedure: OR  Reason for block: at surgeon's request and post-op pain management  Performed by  CRNA/CAA: Nataliia Edouard CRNA  Preanesthetic Checklist  Completed: patient identified, IV checked, site marked, risks and benefits discussed, surgical consent, monitors and equipment checked, pre-op evaluation and timeout performed  Prep:  Pt Position: supine  Sterile barriers:cap, gloves, mask and washed/disinfected hands  Prep: ChloraPrep  Patient monitoring: blood pressure monitoring, continuous pulse oximetry and EKG  Procedure    Sedation: yes  Performed under: general  Guidance:ultrasound guided  Images:still images obtained, printed/placed on chart    Laterality:Bilateral  Block Type:TAP  Injection Technique:single-shot  Needle Type:short-bevel and echogenic  Needle Gauge:20 G  Resistance on Injection: none    Medications Used: dexamethasone sodium phosphate injection - Injection   4 mg - 1/9/2024 7:41:00 AM  bupivacaine PF (MARCAINE) 0.25 % injection - Injection   60 mL - 1/9/2024 7:41:00 AM      Medications  Comment:Block Injection:  LA dose divided between Right and Left block        Post Assessment  Injection Assessment: negative aspiration for heme, incremental injection and no paresthesia on injection  Patient Tolerance:comfortable throughout block  Complications:no  Additional Notes    Subcostal TAPs    A high-frequency linear transducer, with sterile cover, was placed sub-xiphoid to identify [place], right and left Rectus Abdominus Muscles (BECKY). The transducer was moved either right or left subcostally to identify the BECKY and the Transverse Abdominus Muscle (WARNER). The insertion site was prepped in sterile fashion and then localized with 2-5 ml of 1% Lidocaine. Using ultrasound-guidance, a 20-gauge B-Mariscal 4\" Ultraplex 360 non-stimulating echogenic needle was advanced in plane, from medial to lateral, until " "the tip of the needle was in the fascial plane between the BECKY and WARNER. 1-3ml of preservative free normal saline was used to hydro-dissect the fascial planes. After the fascial plane was verified, the local anesthetic (LA) was injected. The procedure was repeated on the opposite side for bilateral coverage. Aspiration every 5 ml to prevent intravascular injection. Injection was completed with negative aspiration of blood and negative intravascular injection. Injection pressures were normal with minimal resistance. The subcostal approach to the TAP nerve block ideally anesthetizes the intercostal nerves T6-T9.     Mid-Axillary/Lateral TAPs    A high-frequency linear transducer, with sterile cover, was placed in the midaxillary line between the ASIS and costal margin. The External Oblique Muscle (EOM), Internal Oblique Muscle (IOM), Transverse Abdominus Muscle (WARNER), and Peritoneum were identified. The insertion site was prepped in sterile fashion and then localized with 2-5 ml of 1% Lidocaine. Using ultrasound-guidance, a 20-gauge B-Mariscal 4\" Ultraplex 360 non-stimulating echogenic needle was advanced in plane, from medial to lateral, until the tip of the needle was in the fascial plane between the IOM and WARNER. 1-3ml of preservative free normal saline was used to hydro-dissect the fascial planes. After the fascial plane was verified, the local anesthetic (LA) was injected. The procedure was repeated on the opposite side for bilateral coverage. Aspiration every 5 ml to prevent intravascular injection. Injection was completed with negative aspiration of blood and negative intravascular injection. Injection pressures were normal with minimal resistance. Midaxillary TAPs should reach intercostal nerves T10- T11 and the subcostal nerve T12.              "

## 2024-01-09 NOTE — ANESTHESIA PROCEDURE NOTES
Airway  Urgency: elective    Date/Time: 1/9/2024 7:36 AM  Airway not difficult    General Information and Staff    Patient location during procedure: OR  CRNA/CAA: Nataliia Edouard CRNA    Indications and Patient Condition  Indications for airway management: airway protection    Preoxygenated: yes  MILS not maintained throughout  Mask difficulty assessment: 1 - vent by mask    Final Airway Details  Final airway type: endotracheal airway      Successful airway: ETT  Cuffed: yes   Successful intubation technique: direct laryngoscopy  Endotracheal tube insertion site: oral  Blade: Gio  Blade size: 3  ETT size (mm): 7.0  Cormack-Lehane Classification: grade I - full view of glottis  Placement verified by: chest auscultation and capnometry   Measured from: lips  ETT/EBT  to lips (cm): 20  Number of attempts at approach: 1  Assessment: lips, teeth, and gum same as pre-op and atraumatic intubation    Additional Comments  Negative epigastric sounds, Breath sound equal bilaterally with symmetric chest rise and fall

## 2024-01-09 NOTE — PLAN OF CARE
Problem: Adult Inpatient Plan of Care  Goal: Plan of Care Review  Outcome: Ongoing, Progressing  Flowsheets (Taken 1/9/2024 9550)  Progress: improving  Plan of Care Reviewed With: patient  Goal: Patient-Specific Goal (Individualized)  Outcome: Ongoing, Progressing  Goal: Absence of Hospital-Acquired Illness or Injury  Outcome: Ongoing, Progressing  Goal: Optimal Comfort and Wellbeing  Outcome: Ongoing, Progressing  Goal: Readiness for Transition of Care  Outcome: Ongoing, Progressing     Problem: Pain Acute  Goal: Acceptable Pain Control and Functional Ability  Outcome: Ongoing, Progressing     Problem: Infection  Goal: Absence of Infection Signs and Symptoms  Outcome: Ongoing, Progressing     Problem: Fall Injury Risk  Goal: Absence of Fall and Fall-Related Injury  Outcome: Ongoing, Progressing   Goal Outcome Evaluation:  Plan of Care Reviewed With: patient        Progress: improving

## 2024-01-09 NOTE — ANESTHESIA POSTPROCEDURE EVALUATION
Patient: Racheal De La Vega    Procedure Summary       Date: 01/09/24 Room / Location:  [facility] [address] [place] [address] [facility]    Anesthesia Start: 0731 Anesthesia Stop:     Procedures:       ROBOTIC LOW ANTERIOR RESECTION, TRANSANAL EXTRACTION OF SPECIMEN, TAKEDOWN OF COLOVAGINAL FISTULA (Abdomen)      CYSTOSCOPY TEMPORARY PLACEMENT BILATERAL URETERAL CATHETER (Bladder) Diagnosis:     Surgeons: Prachi Helton MD; Yordan Reynoso MD Provider: Edgardo Vasquez Jr., MD    Anesthesia Type: general with block ASA Status: 3            Anesthesia Type: general with block    Vitals  Vitals Value Taken Time   BP     Temp     Pulse 73 01/09/24 1020   Resp     SpO2 98 % 01/09/24 1020   Vitals shown include unfiled device data.    /70  TEMP 98.1      Post Anesthesia Care and Evaluation    Patient location during evaluation: PACU  Patient participation: complete - patient participated  Level of consciousness: awake and alert  Pain management: adequate    Airway patency: patent  Anesthetic complications: No anesthetic complications  PONV Status: none  Cardiovascular status: hemodynamically stable and acceptable  Respiratory status: nonlabored ventilation, acceptable and nasal cannula  Hydration status: acceptable

## 2024-01-09 NOTE — H&P
Admission HP     Patient Name: Racheal De La Vega  MRN: [id number]  : 1950  DOS: 2024    Attending: Prachi Helton MD    Primary Care Provider: Charlie Dominguez MD      Patient Care Team:  Charlie Dominguez MD as PCP - General (General Practice)  Magaly Méndez OT as Occupational Therapist (Occupational Therapy)    Chief complaint: Diverticulitis/colovaginal fistula    Subjective   Patient is a pleasant 73 y.o. female presented for scheduled surgery by Dr. Helton.    Patient with history of diverticulitis and colovaginal fistula.  Today she underwent  Robotic low anterior resection with transanal extraction of the specimen, Robotic lysis of adhesions with colovaginal fistula takedown and rigid proctoscopy.  She also underwent cystoscopy with bilateral ureteral catheter/stent insertion and Faust catheter insertion/changed by Dr. Reynoso.    She tolerated surgery well, is admitted for further management.    I saw her in PACU where she is doing well with good pain control no complains of nausea, no shortness of breath.    She is known to me from prior hospitalization for orthopedic surgeries.    Allergies:  No Known Allergies    Meds:  Medications Prior to Admission   Medication Sig Dispense Refill Last Dose    albuterol sulfate  (90 Base) MCG/ACT inhaler Inhale 1 puff Daily.   2024    Biotin 38801 MCG tablet Take 1 tablet by mouth Daily.   Past Month    Cholecalciferol (vitamin D3) 125 MCG (5000 UT) tablet tablet Take 1 tablet by mouth Daily.   2024    magnesium oxide (MAG-OX) 400 MG tablet Take 1 tablet by mouth Daily.   2024    rosuvastatin (CRESTOR) 5 MG tablet Take 1 tablet by mouth Every Night.   2024    acetaminophen (TYLENOL) 500 MG tablet Take 2 tablets by mouth Every 6 (Six) Hours As Needed for Mild Pain.       estradiol (VIVELLE-DOT) 0.025 MG/24HR patch Place 1 patch on the skin as directed by provider 2 (Two) Times a Week.       Estriol-Estradiol (Bi-Est  "50:50) cream Place  on the skin as directed by provider.        Past Medical History:   Diagnosis Date    Asthma     Colon polyp     Diverticulosis     Glaucoma     Headache     Hyperlipidemia      Past Surgical History:   Procedure Laterality Date    BLADDER SURGERY      PROLAPSED BLADDER    CATARACT EXTRACTION, BILATERAL      COLONOSCOPY      GLAUCOMA SURGERY Bilateral     JOINT REPLACEMENT Left     SHOULDER    RETINAL DETACHMENT SURGERY Bilateral     TOE SURGERY Left     FIRST TOE    TOTAL KNEE ARTHROPLASTY Right 2014    TOTAL SHOULDER ARTHROPLASTY Left 2022    Procedure: TOTAL REVERSE SHOULDER ARTHROPLASTY LEFT;  Surgeon: Alen Santacruz MD;  Location: [facility];  Service: Orthopedics;  Laterality: Left;     Family History   Problem Relation Age of Onset    Heart disease Mother     Heart disease Father     Stroke Father      Social History     Tobacco Use    Smoking status: Former     Packs/day: 0.25     Years: 20.00     Additional pack years: 0.00     Total pack years: 5.00     Types: Cigarettes     Quit date:      Years since quittin.0    Smokeless tobacco: Never   Vaping Use    Vaping Use: Never used   Substance Use Topics    Alcohol use: No    Drug use: No       Review of Systems  Pertinent items are noted in HPI    Vital Signs  /65 (BP Location: Right arm, Patient Position: Lying)   Pulse 94   Temp 98.1 °F (36.7 °C) (Temporal)   Resp 14   Ht 162.6 cm (64\")   Wt 73.4 kg (161 lb 13.1 oz)   SpO2 96%   BMI 27.78 kg/m²     Physical Exam:    General Appearance:    Alert, cooperative, in no acute distress   Head:    Normocephalic, without obvious abnormality, atraumatic   Eyes:            Lids and lashes normal, conjunctivae and sclerae normal, no   icterus, no pallor, corneas clear   Ears:    Ears appear intact with no abnormalities noted   Throat:   No oral lesions, no thrush, oral mucosa moist   Neck:   No adenopathy, supple, trachea midline, no thyromegaly       "   Lungs:     Clear to auscultation,respirations regular, even and       unlabored. No wheezes or rales.    Heart:    Regular rhythm and normal rate, normal S1 and S2, no murmur    Abdomen:      Soft and benign with clean incisions   Genitalia:    Deferred  Faust catheter with dye extending to the urine.   Extremities:   Moves all extremities well, no edema, no cyanosis, no              redness   Pulses:   Pulses palpable and equal bilaterally   Skin:   No bleeding, bruising or rash   Neurologic:   Cranial nerves 2 - 12 grossly intact, no gross deficit     Results from last 7 days   Lab Units 01/09/24  1056 01/02/24  1505   WBC 10*3/mm3  --  10.60   HEMOGLOBIN g/dL 12.1 14.0   HEMATOCRIT % 38.0 43.9   PLATELETS 10*3/mm3  --  296     Results from last 7 days   Lab Units 01/02/24  1505   SODIUM mmol/L 137   POTASSIUM mmol/L 4.0   CHLORIDE mmol/L 100   CO2 mmol/L 28.0   BUN mg/dL 11   CREATININE mg/dL 0.76   CALCIUM mg/dL 9.4   BILIRUBIN mg/dL 0.3   ALK PHOS U/L 71   ALT (SGPT) U/L 11   AST (SGOT) U/L 19   GLUCOSE mg/dL 111*     Lab Results   Component Value Date    HGBA1C 5.30 01/02/2024       Assessment and Plan:       Status post laparoscopic colectomy, robotic LAR, lysis of adhesions with colovaginal fistula takedown    Colovaginal fistula    Hyperlipidemia      Plan  1. Ambulation, several times daily  2. Pain control-prns, tap block, multimodal approach.   3. IS-encourage  4. DVT proph- Mechanical, subcutaneous Lovenox.  5. Bowel regimen, alvimopan  6. Resume home medications as appropriate  7. Monitor post-op labs  8. DC planning   9. Diet, Clears, advance diet as tolerated.  IVF initially, monitor volume status.    -Monitor for spontaneous voiding after Faust removal tomorrow.    -Dyslipidemia:  Resume home regimen , statin ( formulary substitution when appropriate).    Discussed with patient and her children at bedside plan for admission for further management.  All questions were answered.    Paige  disclaimer:  Part of this encounter note is an electronic transcription/translation of spoken language to printed text. The electronic translation of spoken language may permit erroneous, or at times, nonsensical words or phrases to be inadvertently transcribed; Although I have reviewed the note for such errors, some may still exist.    Linda Martin MD  01/09/24  14:20 EST

## 2024-01-09 NOTE — OP NOTE
[facility]  OPERATIVE REPORT      Racheal De La Vega  1/9/2024    Pre-op Diagnosis:   History of sigmoid diverticulitis.    Colovaginal fistula.    Chronic constipation.      Post-op Diagnosis:    History of sigmoid diverticulitis.    Colovaginal fistula.    Chronic constipation.    Procedure(s) Performed:  Robotic low anterior resection with transanal extraction of the specimen.    Robotic lysis of adhesions with colovaginal fistula takedown.    Rigid proctoscopy.        Surgeon(s):  Prachi Helton MD Stark, Timothy, MD    Anesthesia: General with Block    Staff:   Circulator: India Hdz RN; Fina Gudino RN  Scrub Person: Cadence Ngo  Nursing Assistant: Melania Lerma  Assistant: Galdino Reynoso PA    Assistant: Galdino Reynoso PA was responsible for performing the following activities: Retraction, Suction, Irrigation, Suturing, Closing, Placing Dressing, and Held/Positioned Camera and their skilled assistance was necessary for the success of this case.     This procedure was not performed to treat colon cancer through resection         Estimated Blood Loss: minimal    Urine Voided: 900 mL    Specimens:                Specimens       ID Source Type Tests Collected By Collected At Frozen?    A Large Intestine, Sigmoid Colon Tissue TISSUE PATHOLOGY EXAM   Prachi Helton MD 1/9/24 0920     Description: SIGMOID, UPPER RECTUM, ANASTOMTIC RINGS                  Drains:   Urethral Catheter Silicone 16 Fr. (Active)   Daily Indications Selected surgeries ( tract, abdomen) 01/09/24 1023   Site Assessment Skin intact;Clean 01/09/24 1017   Collection Container Standard drainage bag 01/09/24 1023   Securement Method Securing device 01/09/24 1023       Findings: Colovaginal fistula from the mid sigmoid colon to the vaginal cuff.  Negative air leak test.    Complications: None.    Procedure in Detail:     Lithotomy position.    Cystoscopy and bilateral ureteral catheters with  isocyanate green dye will be dictated separately by Dr. Yordan Reynoso.    Operative site was prepped and draped in the usual sterile fashion.  Veress needle abdominal entry Larson's port was performed.  Saline drop test was reassuring.  Initial pressures were low.  The abdomen was insufflated to 15 mmHg pressure.    The patient was placed in Trendelenburg position to 25 degrees and 9 degrees right side tilt.        Optiview trocar was used to gain entrance in the abdomen without complication.  The Veress needle site was inspected and there was no evidence of injury.    Robot was then docked after 4 additional 8mm robot ports were placed under direct visualization.    Pathology noted in the mid to distal sigmoid colon with adhesions in the pelvis.  Specifically to the right pelvic sidewall and the vaginal cuff.   Tedious dissection was performed to take the colon off of the vagina.  The line of Toldt was then incised.    The left ureter was identified and kept out of the plane of dissection at all times.     EEA sizers were used to choose a 28 mm circular stapler.    Rectum transected below where the taenia splayed, below the sacral promontory with the vessel seal device.  The mesentery of the colon was transected using the vessel sealer device up to the level of the healthy spot on the descending colon, taking care to stay close to the colon.    A healthy location on the descending colon was chosen for a point of transection. This was performed using the vessel sealer device.  Isocyanate green was used to confirm good fluorescence at the descending colon conduit as well as the rectum.    A small pliable Efren wound protector was then placed through the rectum.  The rectum was dilated with a large sizer.  The specimen was spatulated and removed from the rectum.    The anvil placed in the proximal colon and secured with a pursestring of 3-0 V strata fix.  Another pursestring was placed on the rectum and the stapler  was placed into the rectum and the spike was opened and the pursestring was secured around the spike.    Anastomosis was performed.   28 mm Covidien stapler and was used.    Rigid proctoscopy performed anastomosis under saline irrigation.  Anastomosis was found to be hemostatic, airtight, and circumferentially intact.  I did put several interrupted figure-of-eight 3-0 Vicryl sutures around the anastomosis for reinforcement.  Specifically, I put a running Vicryl to exclude the anastomosis from the peritoneal cavity and initially the vaginal cuff was high.    The anastomosis was inspected and found to be tension-free.    Abdomen was inspected for hemostasis.  Achieved.    Abdomen was inspected for other pathology. None noted.       Skin closed with monocryl. Exofin dressing applied.     Ureteral catheters were removed intact.    All counts were announced as correct at the end of the case. Patient tolerated procedure well, extubated in the operating room and transferred to recovery room in stable condition.        Prachi Helton MD     Date: 1/9/2024  Time: 10:40 EST

## 2024-01-09 NOTE — H&P
Pre-Op H&P  Racheal De La Vega  [id number]  1950    Chief complaint: Diverticulitis    HPI:  Patient is a 73 y.o.female who presents with a 3-4 history of diverticulitis. Abdominal pain became worse 2 months ago. She denies previous treatment for her diverticulitis. She does admit to recent worsening wheezing for which she was diagnosed with asthma. No other recnet changes in her general health.     Review of Systems:  General ROS: negative for chills, fever or skin lesions;  No changes since last office visit.  Neg for recent sick exposure  Cardiovascular ROS: no chest pain or dyspnea on exertion  Respiratory ROS: positive for wheezing    Allergies: No Known Allergies    Home Meds:    No current facility-administered medications on file prior to encounter.     Current Outpatient Medications on File Prior to Encounter   Medication Sig Dispense Refill    acetaminophen (TYLENOL) 500 MG tablet Take 2 tablets by mouth Every 6 (Six) Hours As Needed for Mild Pain.      Biotin 50430 MCG tablet Take 1 tablet by mouth Daily.      Cholecalciferol (vitamin D3) 125 MCG (5000 UT) tablet tablet Take 1 tablet by mouth Daily.      estradiol (VIVELLE-DOT) 0.025 MG/24HR patch Place 1 patch on the skin as directed by provider 2 (Two) Times a Week.         PMH:   Past Medical History:   Diagnosis Date    Asthma     Colon polyp     Diverticulosis     Glaucoma     Headache     Hyperlipidemia      PSH:    Past Surgical History:   Procedure Laterality Date    BLADDER SURGERY      PROLAPSED BLADDER    CATARACT EXTRACTION, BILATERAL      COLONOSCOPY      GLAUCOMA SURGERY Bilateral     JOINT REPLACEMENT Left 1992    SHOULDER    RETINAL DETACHMENT SURGERY Bilateral     TOE SURGERY Left     FIRST TOE    TOTAL KNEE ARTHROPLASTY Right 2014    TOTAL SHOULDER ARTHROPLASTY Left 03/18/2022    Procedure: TOTAL REVERSE SHOULDER ARTHROPLASTY LEFT;  Surgeon: Alen Santacruz MD;  Location: [facility];  Service: Orthopedics;  Laterality:  Left;       Immunization History:  Influenza: no  Pneumococcal: no  Tetanus: no    Social History:   Tobacco:   Social History     Tobacco Use   Smoking Status Former    Packs/day: 0.25    Years: 20.00    Additional pack years: 0.00    Total pack years: 5.00    Types: Cigarettes    Quit date:     Years since quittin.0   Smokeless Tobacco Never      Alcohol:     Social History     Substance and Sexual Activity   Alcohol Use No       Vitals:           There were no vitals taken for this visit.    Physical Exam:  General Appearance:    Alert, cooperative, no distress, appears stated age   Head:    Normocephalic, without obvious abnormality, atraumatic   Lungs:     Clear to auscultation bilaterally, respirations unlabored    Heart:   Regular rate and rhythm, S1 and S2 normal, no murmur, rub    or gallop    Abdomen:    Soft, nontender.  +bowel sounds   Breast Exam:    deferred   Genitalia:    deferred   Extremities:   Extremities normal, atraumatic, no cyanosis or edema   Skin:   Skin color, texture, turgor normal, no rashes or lesions   Neurologic:   Grossly intact   Results Review  LABS:  Lab Results   Component Value Date    WBC 10.60 2024    HGB 14.0 2024    HCT 43.9 2024    MCV 94.0 2024     2024    NEUTROABS 12.89 (H) 2022    GLUCOSE 111 (H) 2024    BUN 11 2024    CREATININE 0.76 2024    EGFRIFNONA 79 2021     2024    K 4.0 2024     2024    CO2 28.0 2024    CALCIUM 9.4 2024    ALBUMIN 4.3 2024    AST 19 2024    ALT 11 2024    BILITOT 0.3 2024    PTT 30.2 2022    INR 1.03 2022       RADIOLOGY:  No radiology results for the last 3 days     I reviewed the patient's new clinical results.  CBC, Chem profile, CXR, EKG on chart    Cancer Staging (if applicable)  Cancer Patient: __ yes _x_no __unknown; If yes, clinical stage T:__ N:__M:__, stage group or  __N/A    Impression:  Diverticulitis with colo-vaginal fistula    Plan: For robotic low anterior resection, transanal extraction of specimen.  Takedown of colo-vaginal fistula. Temporary placement of bilateral ureteral catheters today      RUI Swanson   01/09/24   6:43 AM EST

## 2024-01-10 PROBLEM — D72.829 LEUKOCYTOSIS: Status: ACTIVE | Noted: 2024-01-10

## 2024-01-10 LAB
ANION GAP SERPL CALCULATED.3IONS-SCNC: 7 MMOL/L (ref 5–15)
BASOPHILS # BLD AUTO: 0.01 10*3/MM3 (ref 0–0.2)
BASOPHILS NFR BLD AUTO: 0.1 % (ref 0–1.5)
BUN SERPL-MCNC: 11 MG/DL (ref 8–23)
BUN/CREAT SERPL: 14.9 (ref 7–25)
CALCIUM SPEC-SCNC: 8.8 MG/DL (ref 8.6–10.5)
CHLORIDE SERPL-SCNC: 101 MMOL/L (ref 98–107)
CO2 SERPL-SCNC: 27 MMOL/L (ref 22–29)
CREAT SERPL-MCNC: 0.74 MG/DL (ref 0.57–1)
CYTO UR: NORMAL
DEPRECATED RDW RBC AUTO: 43.6 FL (ref 37–54)
EGFRCR SERPLBLD CKD-EPI 2021: 85.6 ML/MIN/1.73
EOSINOPHIL # BLD AUTO: 0.01 10*3/MM3 (ref 0–0.4)
EOSINOPHIL NFR BLD AUTO: 0.1 % (ref 0.3–6.2)
ERYTHROCYTE [DISTWIDTH] IN BLOOD BY AUTOMATED COUNT: 12.5 % (ref 12.3–15.4)
GLUCOSE BLDC GLUCOMTR-MCNC: 110 MG/DL (ref 70–130)
GLUCOSE BLDC GLUCOMTR-MCNC: 119 MG/DL (ref 70–130)
GLUCOSE BLDC GLUCOMTR-MCNC: 81 MG/DL (ref 70–130)
GLUCOSE BLDC GLUCOMTR-MCNC: 94 MG/DL (ref 70–130)
GLUCOSE SERPL-MCNC: 131 MG/DL (ref 65–99)
HCT VFR BLD AUTO: 38.5 % (ref 34–46.6)
HGB BLD-MCNC: 12.3 G/DL (ref 12–15.9)
IMM GRANULOCYTES # BLD AUTO: 0.08 10*3/MM3 (ref 0–0.05)
IMM GRANULOCYTES NFR BLD AUTO: 0.5 % (ref 0–0.5)
LAB AP CASE REPORT: NORMAL
LAB AP CLINICAL INFORMATION: NORMAL
LYMPHOCYTES # BLD AUTO: 1.99 10*3/MM3 (ref 0.7–3.1)
LYMPHOCYTES NFR BLD AUTO: 11.2 % (ref 19.6–45.3)
MAGNESIUM SERPL-MCNC: 2 MG/DL (ref 1.6–2.4)
MCH RBC QN AUTO: 30.3 PG (ref 26.6–33)
MCHC RBC AUTO-ENTMCNC: 31.9 G/DL (ref 31.5–35.7)
MCV RBC AUTO: 94.8 FL (ref 79–97)
MONOCYTES # BLD AUTO: 1.18 10*3/MM3 (ref 0.1–0.9)
MONOCYTES NFR BLD AUTO: 6.7 % (ref 5–12)
NEUTROPHILS NFR BLD AUTO: 14.47 10*3/MM3 (ref 1.7–7)
NEUTROPHILS NFR BLD AUTO: 81.4 % (ref 42.7–76)
NRBC BLD AUTO-RTO: 0 /100 WBC (ref 0–0.2)
PATH REPORT.FINAL DX SPEC: NORMAL
PATH REPORT.GROSS SPEC: NORMAL
PLATELET # BLD AUTO: 257 10*3/MM3 (ref 140–450)
PMV BLD AUTO: 10.3 FL (ref 6–12)
POTASSIUM SERPL-SCNC: 4.3 MMOL/L (ref 3.5–5.2)
RBC # BLD AUTO: 4.06 10*6/MM3 (ref 3.77–5.28)
SODIUM SERPL-SCNC: 135 MMOL/L (ref 136–145)
WBC NRBC COR # BLD AUTO: 17.74 10*3/MM3 (ref 3.4–10.8)

## 2024-01-10 PROCEDURE — 94640 AIRWAY INHALATION TREATMENT: CPT

## 2024-01-10 PROCEDURE — 94664 DEMO&/EVAL PT USE INHALER: CPT

## 2024-01-10 PROCEDURE — 82948 REAGENT STRIP/BLOOD GLUCOSE: CPT

## 2024-01-10 PROCEDURE — 25010000002 ENOXAPARIN PER 10 MG: Performed by: COLON & RECTAL SURGERY

## 2024-01-10 PROCEDURE — 83735 ASSAY OF MAGNESIUM: CPT | Performed by: COLON & RECTAL SURGERY

## 2024-01-10 PROCEDURE — 80048 BASIC METABOLIC PNL TOTAL CA: CPT | Performed by: COLON & RECTAL SURGERY

## 2024-01-10 PROCEDURE — 85025 COMPLETE CBC W/AUTO DIFF WBC: CPT | Performed by: COLON & RECTAL SURGERY

## 2024-01-10 RX ORDER — ALBUTEROL SULFATE 2.5 MG/3ML
2.5 SOLUTION RESPIRATORY (INHALATION) EVERY 6 HOURS PRN
Status: DISCONTINUED | OUTPATIENT
Start: 2024-01-10 | End: 2024-01-11 | Stop reason: HOSPADM

## 2024-01-10 RX ADMIN — ACETAMINOPHEN 1000 MG: 500 TABLET ORAL at 20:28

## 2024-01-10 RX ADMIN — POTASSIUM CHLORIDE, DEXTROSE MONOHYDRATE AND SODIUM CHLORIDE 100 ML/HR: 150; 5; 450 INJECTION, SOLUTION INTRAVENOUS at 10:14

## 2024-01-10 RX ADMIN — ENOXAPARIN SODIUM 40 MG: 100 INJECTION SUBCUTANEOUS at 08:11

## 2024-01-10 RX ADMIN — POTASSIUM CHLORIDE, DEXTROSE MONOHYDRATE AND SODIUM CHLORIDE 100 ML/HR: 150; 5; 450 INJECTION, SOLUTION INTRAVENOUS at 00:00

## 2024-01-10 RX ADMIN — ALBUTEROL SULFATE 2.5 MG: 2.5 SOLUTION RESPIRATORY (INHALATION) at 10:33

## 2024-01-10 RX ADMIN — ACETAMINOPHEN 1000 MG: 500 TABLET ORAL at 14:28

## 2024-01-10 RX ADMIN — DIAZEPAM 5 MG: 5 TABLET ORAL at 20:28

## 2024-01-10 RX ADMIN — ROSUVASTATIN 5 MG: 10 TABLET, FILM COATED ORAL at 20:27

## 2024-01-10 RX ADMIN — ACETAMINOPHEN 1000 MG: 500 TABLET ORAL at 08:10

## 2024-01-10 NOTE — PLAN OF CARE
Goal Outcome Evaluation:                   Pt complained sharp, severe pain on left lower chest. Vital sign stable, normal sinus rhythm on EKG. Pain was immediately released by PRN Morphine.

## 2024-01-10 NOTE — PROGRESS NOTES
" progress note      Racheal De La Vega  [id number]  1950     LOS: 1 day     Attending: Prachi Helton MD    Primary Care Provider: Charlie Dominguez MD      Chief Complaint/Reason for visit:  Abd pain    Subjective   Doing well. Good pain control. Tolerating clears. Ambulating. Voiding. Had BM. Denies f/c/n/v/sob/cp.    Objective     Vital Signs    Visit Vitals  /63 (BP Location: Right arm, Patient Position: Lying)   Pulse 86   Temp 96.9 °F (36.1 °C) (Oral)   Resp 20   Ht 162.6 cm (64\")   Wt 75.7 kg (166 lb 14.4 oz)   SpO2 96%   BMI 28.65 kg/m²     Temp (24hrs), Av.6 °F (36.4 °C), Min:96.9 °F (36.1 °C), Max:98.2 °F (36.8 °C)      Nutrition: Clears, adv to full liquids    Respiratory: RA    Physical Exam:     General Appearance:    Alert, cooperative, in no acute distress   Head:    Normocephalic, without obvious abnormality, atraumatic    Lungs:     Normal effort, symmetric chest rise, no crepitus, clear to      auscultation bilaterally             Heart:    Regular rhythm and normal rate, normal S1 and S2   Abdomen:     Soft, expected tenderness. Lap sites CDI   Extremities:   No clubbing, cyanosis or edema.  No deformities.    Pulses:   Pulses palpable and equal bilaterally   Skin:   No bleeding, bruising or rash   Neurologic:   Moves all extremities with no obvious focal motor deficit.  Cranial nerves 2 - 12 grossly intact     Results Review:     I reviewed the patient's new clinical results.   Results from last 7 days   Lab Units 01/10/24  0448 24  1056   WBC 10*3/mm3 17.74*  --    HEMOGLOBIN g/dL 12.3 12.1   HEMATOCRIT % 38.5 38.0   PLATELETS 10*3/mm3 257  --      Results from last 7 days   Lab Units 01/10/24  0448   SODIUM mmol/L 135*   POTASSIUM mmol/L 4.3   CHLORIDE mmol/L 101   CO2 mmol/L 27.0   BUN mg/dL 11   CREATININE mg/dL 0.74   CALCIUM mg/dL 8.8   GLUCOSE mg/dL 131*     I reviewed the patient's new imaging including images and reports.    All medications reviewed. "   acetaminophen, 1,000 mg, Oral, Q6H  enoxaparin, 40 mg, Subcutaneous, Daily  insulin lispro, 0-7 Units, Subcutaneous, 4x Daily AC & at Bedtime  rosuvastatin, 5 mg, Oral, Nightly        Assessment & Plan     Status post laparoscopic colectomy, robotic LAR, lysis of adhesions with colovaginal fistula takedown    Postoperative pain    Colovaginal fistula    Hyperlipidemia    Leukocytosis, likely reactive      Plan  1. Ambulation  2. Pain control-prns   3. IS-encouraged  4. DVT proph- mechs/Lovenox  5. Bowel regimen  6. Adv to full liquid diet. Continue IVF   7. Monitor post-op labs  8. DC planning for home     -Dyslipidemia:  Resume home regimen , statin ( formulary substitution when appropriate).      NATALIE Boyer  01/10/24  11:28 EST

## 2024-01-10 NOTE — CASE MANAGEMENT/SOCIAL WORK
Discharge Planning Assessment  [facility]     Patient Name: Racheal De La Vega  MRN: [id number]  Today's Date: 1/10/2024    Admit Date: 1/9/2024    Plan: Home   Discharge Needs Assessment       Row Name 01/10/24 0930       Living Environment    People in Home spouse    Current Living Arrangements home    Potentially Unsafe Housing Conditions none    In the past 12 months has the electric, gas, oil, or water company threatened to shut off services in your home? No    Primary Care Provided by self    Provides Primary Care For no one    Family Caregiver if Needed none    Quality of Family Relationships supportive    Able to Return to Prior Arrangements yes       Resource/Environmental Concerns    Resource/Environmental Concerns none    Transportation Concerns none       Transportation Needs    In the past 12 months, has lack of transportation kept you from medical appointments or from getting medications? no    In the past 12 months, has lack of transportation kept you from meetings, work, or from getting things needed for daily living? No       Food Insecurity    Within the past 12 months, you worried that your food would run out before you got the money to buy more. Never true    Within the past 12 months, the food you bought just didn't last and you didn't have money to get more. Never true       Transition Planning    Patient/Family Anticipates Transition to home with family    Patient/Family Anticipated Services at Transition none    Transportation Anticipated family or friend will provide       Discharge Needs Assessment    Readmission Within the Last 30 Days no previous admission in last 30 days    Equipment Currently Used at Home none    Concerns to be Addressed discharge planning    Anticipated Changes Related to Illness none    Equipment Needed After Discharge none                   Discharge Plan       Row Name 01/10/24 0931       Plan    Plan Home    Patient/Family in Agreement with Plan yes    Plan  Comments Met with Mrs. De La Vega at the bedside to initiate discharge plan. Mrs. De La Vega lives in a [place] home with her . She is independent with activities of daily living and reports she is active at home. She has no DME and is not current with home health or outpatient services. She states she has had home health in the past but could not remember which agency she used. Her primary care provider is Dr. Charlie Dominguez. She denies problems with accessing medical care or obtaining medications. Her current discharge plan is home with her , and he will transport.  will continue to follow plan of care and assist with discharge planning needs as indicated.    Final Discharge Disposition Code 01 - home or self-care                  Continued Care and Services - Admitted Since 1/9/2024    Coordination has not been started for this encounter.          Demographic Summary       Row Name 01/10/24 0929       General Information    Admission Type inpatient    Arrived From home    Referral Source admission list    Reason for Consult discharge planning    Preferred Language English       Contact Information    Permission Granted to Share Info With     Contact Information Obtained for     Contact Information Comments Keyur De La Vega Spouse [phone]                   Functional Status       Row Name 01/10/24 0929       Functional Status    Usual Activity Tolerance excellent    Current Activity Tolerance good       Physical Activity    On average, how many days per week do you engage in moderate to strenuous exercise (like a brisk walk)? 5 days    On average, how many minutes do you engage in exercise at this level? 30 min    Number of minutes of exercise per week 150       Assessment of Health Literacy    How often do you have someone help you read hospital materials? Never    How often do you have problems learning about your medical condition because of difficulty  understanding written information? Never    How often do you have a problem understanding what is told to you about your medical condition? Never    How confident are you filling out medical forms by yourself? Extremely    Health Literacy Good       Functional Status, IADL    Medications independent    Meal Preparation independent    Housekeeping independent    Laundry independent    Shopping independent       Mental Status    General Appearance WDL WDL       Mental Status Summary    Recent Changes in Mental Status/Cognitive Functioning no changes                   Psychosocial    No documentation.                  Abuse/Neglect    No documentation.                  Legal    No documentation.                  Substance Abuse    No documentation.                  Patient Forms    No documentation.                     Anila Palomino RN

## 2024-01-10 NOTE — PROGRESS NOTES
Some serosanguineous drainage from the vagina.  Not surprising given fistula takedown yesterday.  This typically resolves within a couple weeks after surgery.  Otherwise doing well.  Eating.  Bowels are moving.  Pain is well-controlled.  I have saline locked her IV and recommended disposable underwear to manage her drainage symptoms at this point.  She would like to go home in the morning and I think that is very reasonable.

## 2024-01-11 VITALS
BODY MASS INDEX: 28.49 KG/M2 | HEIGHT: 64 IN | SYSTOLIC BLOOD PRESSURE: 160 MMHG | DIASTOLIC BLOOD PRESSURE: 98 MMHG | RESPIRATION RATE: 18 BRPM | HEART RATE: 96 BPM | WEIGHT: 166.9 LBS | OXYGEN SATURATION: 95 % | TEMPERATURE: 97 F

## 2024-01-11 LAB
BASOPHILS # BLD AUTO: 0.08 10*3/MM3 (ref 0–0.2)
BASOPHILS NFR BLD AUTO: 0.5 % (ref 0–1.5)
DEPRECATED RDW RBC AUTO: 42.2 FL (ref 37–54)
EOSINOPHIL # BLD AUTO: 0.29 10*3/MM3 (ref 0–0.4)
EOSINOPHIL NFR BLD AUTO: 1.7 % (ref 0.3–6.2)
ERYTHROCYTE [DISTWIDTH] IN BLOOD BY AUTOMATED COUNT: 12.6 % (ref 12.3–15.4)
HCT VFR BLD AUTO: 40.4 % (ref 34–46.6)
HGB BLD-MCNC: 13.2 G/DL (ref 12–15.9)
IMM GRANULOCYTES # BLD AUTO: 0.09 10*3/MM3 (ref 0–0.05)
IMM GRANULOCYTES NFR BLD AUTO: 0.5 % (ref 0–0.5)
LYMPHOCYTES # BLD AUTO: 4.58 10*3/MM3 (ref 0.7–3.1)
LYMPHOCYTES NFR BLD AUTO: 26.5 % (ref 19.6–45.3)
MCH RBC QN AUTO: 30 PG (ref 26.6–33)
MCHC RBC AUTO-ENTMCNC: 32.7 G/DL (ref 31.5–35.7)
MCV RBC AUTO: 91.8 FL (ref 79–97)
MONOCYTES # BLD AUTO: 1.04 10*3/MM3 (ref 0.1–0.9)
MONOCYTES NFR BLD AUTO: 6 % (ref 5–12)
NEUTROPHILS NFR BLD AUTO: 11.21 10*3/MM3 (ref 1.7–7)
NEUTROPHILS NFR BLD AUTO: 64.8 % (ref 42.7–76)
NRBC BLD AUTO-RTO: 0 /100 WBC (ref 0–0.2)
PLATELET # BLD AUTO: 302 10*3/MM3 (ref 140–450)
PMV BLD AUTO: 10 FL (ref 6–12)
RBC # BLD AUTO: 4.4 10*6/MM3 (ref 3.77–5.28)
WBC NRBC COR # BLD AUTO: 17.29 10*3/MM3 (ref 3.4–10.8)

## 2024-01-11 PROCEDURE — 85025 COMPLETE CBC W/AUTO DIFF WBC: CPT | Performed by: COLON & RECTAL SURGERY

## 2024-01-11 PROCEDURE — 25010000002 ERTAPENEM PER 500 MG: Performed by: COLON & RECTAL SURGERY

## 2024-01-11 PROCEDURE — 25010000002 ENOXAPARIN PER 10 MG: Performed by: COLON & RECTAL SURGERY

## 2024-01-11 RX ORDER — AMOXICILLIN AND CLAVULANATE POTASSIUM 875; 125 MG/1; MG/1
1 TABLET, FILM COATED ORAL 2 TIMES DAILY
Qty: 14 TABLET | Refills: 0 | Status: SHIPPED | OUTPATIENT
Start: 2024-01-11

## 2024-01-11 RX ORDER — TRAMADOL HYDROCHLORIDE 50 MG/1
50 TABLET ORAL EVERY 6 HOURS PRN
Qty: 30 TABLET | Refills: 0 | Status: SHIPPED | OUTPATIENT
Start: 2024-01-11

## 2024-01-11 RX ORDER — L. ACIDOPHILUS/L.BULGARICUS 1MM CELL
1 TABLET ORAL 2 TIMES DAILY
Qty: 20 TABLET | Refills: 0 | Status: SHIPPED | OUTPATIENT
Start: 2024-01-11

## 2024-01-11 RX ADMIN — ERTAPENEM 1000 MG: 1 INJECTION INTRAMUSCULAR; INTRAVENOUS at 12:17

## 2024-01-11 RX ADMIN — ENOXAPARIN SODIUM 40 MG: 100 INJECTION SUBCUTANEOUS at 08:42

## 2024-01-11 RX ADMIN — ACETAMINOPHEN 1000 MG: 500 TABLET ORAL at 08:42

## 2024-01-11 NOTE — DISCHARGE SUMMARY
Patient Name: Racheal De La Vega  MRN: [id number]  : 1950  DOS: 2024    Attending: Prachi Helton MD    Primary Care Provider: Charlie Dominguez MD    Date of Admission:.2024  5:26 AM    Date of Discharge:  2024    Discharge Diagnosis:   Status post laparoscopic colectomy, robotic LAR, lysis of adhesions with colovaginal fistula takedown    Postoperative pain    Colovaginal fistula    Hyperlipidemia    Leukocytosis, likely reactive      Hospital Course  At admit    Patient is a pleasant 73 y.o. female presented for scheduled surgery by Dr. Helton.     Patient with history of diverticulitis and colovaginal fistula.  Today she underwent  Robotic low anterior resection with transanal extraction of the specimen, Robotic lysis of adhesions with colovaginal fistula takedown and rigid proctoscopy.  She also underwent cystoscopy with bilateral ureteral catheter/stent insertion and Faust catheter insertion/changed by Dr. Reynoso.     She tolerated surgery well, is admitted for further management.     I saw her in PACU where she is doing well with good pain control no complains of nausea, no shortness of breath.     She is known to me from prior hospitalization for orthopedic surgeries.    After admit    She was provided pain medication as needed for pain control.  Pt received DVT prophylaxis with subcutaneous lovenox as well as mechanicals      Patient was started on clear liquid diet, this was advanced when she showed evidence of bowel function return.      Tolerated diet without difficulty.    During her stay, she  used an IS for atelectasis prophylaxis.    Home medications were resumed as appropriate, and labs were monitored and remained fairly stable.  Noting leukocytosis but no evidence clinically of active infection. She will be on 7 days of Augmentin and a probiotic per d/w .     With the progress she has made, pt is ready for DC home today.      Discussed with patient regarding plan  "and she shows understanding and agreement.       Procedures Performed  Procedure(s):  ROBOTIC LOW ANTERIOR RESECTION, TRANSANAL EXTRACTION OF SPECIMEN, TAKEDOWN OF COLOVAGINAL FISTULA  CYSTOSCOPY TEMPORARY PLACEMENT BILATERAL URETERAL CATHETER       Pertinent Test Results:    I reviewed the patient's new clinical results.   Results from last 7 days   Lab Units 24  0509 01/10/24  0448 24  1056   WBC 10*3/mm3 17.29* 17.74*  --    HEMOGLOBIN g/dL 13.2 12.3 12.1   HEMATOCRIT % 40.4 38.5 38.0   PLATELETS 10*3/mm3 302 257  --      Results from last 7 days   Lab Units 01/10/24  0448   SODIUM mmol/L 135*   POTASSIUM mmol/L 4.3   CHLORIDE mmol/L 101   CO2 mmol/L 27.0   BUN mg/dL 11   CREATININE mg/dL 0.74   CALCIUM mg/dL 8.8   GLUCOSE mg/dL 131*     I reviewed the patient's new imaging including images and reports.         Discharge Assessment:       Visit Vitals  /98 (BP Location: Right arm, Patient Position: Lying)   Pulse 96   Temp 97 °F (36.1 °C) (Axillary)   Resp 18   Ht 162.6 cm (64\")   Wt 75.7 kg (166 lb 14.4 oz)   SpO2 95%   BMI 28.65 kg/m²     Temp (24hrs), Av.7 °F (36.5 °C), Min:97 °F (36.1 °C), Max:98.2 °F (36.8 °C)      General Appearance:    Alert, cooperative, in no acute distress   Lungs:     Clear to auscultation,respirations regular, even and                   unlabored    Heart:    Regular rhythm and normal rate, normal S1 and S2    Abdomen:   Soft and benign with clean incisions   Extremities:   Moves all extremities well, no edema, no cyanosis, no              redness   Pulses:   Pulses palpable and equal bilaterally   Skin:   No bleeding, bruising or rash            Discharge Disposition:  Home.         Discharge Medications        New Medications        Instructions Start Date   amoxicillin-clavulanate 875-125 MG per tablet  Commonly known as: AUGMENTIN   1 tablet, Oral, 2 Times Daily      Lactobacillus Probiotic tablet   1 tablet, Oral, 2 Times Daily      traMADol 50 MG " tablet  Commonly known as: ULTRAM   50 mg, Oral, Every 6 Hours PRN             Continue These Medications        Instructions Start Date   acetaminophen 500 MG tablet  Commonly known as: TYLENOL   1,000 mg, Oral, Every 6 Hours PRN      albuterol sulfate  (90 Base) MCG/ACT inhaler  Commonly known as: PROVENTIL HFA;VENTOLIN HFA;PROAIR HFA   1 puff, Inhalation, Daily      Bi-Est 50:50 cream   Transdermal      Biotin 64309 MCG tablet   10,000 mcg, Oral, Daily      estradiol 0.025 MG/24HR patch  Commonly known as: VIVELLE-DOT   1 patch, Transdermal, 2 Times Weekly      magnesium oxide 400 MG tablet  Commonly known as: MAG-OX   400 mg, Oral, Daily      rosuvastatin 5 MG tablet  Commonly known as: CRESTOR   5 mg, Oral, Nightly      vitamin D3 125 MCG (5000 UT) tablet tablet   5,000 Units, Oral, Daily               Discharge Diet: Soft. Low fiber.     Activity at Discharge:   Ambulate.  Restrictions per colorectal surgery  Follow-up Appointments:  2 weeks with Prachi Helton MD.    Paige disclaimer:  Part of this encounter note is an electronic transcription/translation of spoken language to printed text. The electronic translation of spoken language may permit erroneous, or at times, nonsensical words or phrases to be inadvertently transcribed; Although I have reviewed the note for such errors, some may still exist.       Linda Martin MD  01/11/24  12:33 EST

## 2024-01-11 NOTE — PLAN OF CARE
Goal Outcome Evaluation:                   Pt was anxious in the beginning of shift but once quiet environment was set and taking Valium with her scheduled night meds, pt slept and rested well throughout shift.

## 2024-01-12 LAB
QT INTERVAL: 372 MS
QTC INTERVAL: 412 MS

## 2024-05-15 ENCOUNTER — ANESTHESIA EVENT (OUTPATIENT)
Dept: PERIOP | Facility: HOSPITAL | Age: 74
End: 2024-05-15
Payer: MEDICARE

## 2024-05-15 ENCOUNTER — PRE-ADMISSION TESTING (OUTPATIENT)
Dept: PREADMISSION TESTING | Facility: HOSPITAL | Age: 74
End: 2024-05-15
Payer: MEDICARE

## 2024-05-15 VITALS — HEIGHT: 64 IN | BODY MASS INDEX: 26.23 KG/M2 | WEIGHT: 153.66 LBS

## 2024-05-15 LAB
ALBUMIN SERPL-MCNC: 4.2 G/DL (ref 3.5–5.2)
ALBUMIN/GLOB SERPL: 1.6 G/DL
ALP SERPL-CCNC: 82 U/L (ref 39–117)
ALT SERPL W P-5'-P-CCNC: 14 U/L (ref 1–33)
ANION GAP SERPL CALCULATED.3IONS-SCNC: 10 MMOL/L (ref 5–15)
APTT PPP: 29.5 SECONDS (ref 22–39)
AST SERPL-CCNC: 21 U/L (ref 1–32)
BASOPHILS # BLD AUTO: 0.08 10*3/MM3 (ref 0–0.2)
BASOPHILS NFR BLD AUTO: 0.9 % (ref 0–1.5)
BILIRUB SERPL-MCNC: 0.3 MG/DL (ref 0–1.2)
BUN SERPL-MCNC: 12 MG/DL (ref 8–23)
BUN/CREAT SERPL: 15.8 (ref 7–25)
CALCIUM SPEC-SCNC: 9.4 MG/DL (ref 8.6–10.5)
CHLORIDE SERPL-SCNC: 98 MMOL/L (ref 98–107)
CO2 SERPL-SCNC: 27 MMOL/L (ref 22–29)
CREAT SERPL-MCNC: 0.76 MG/DL (ref 0.57–1)
DEPRECATED RDW RBC AUTO: 43.2 FL (ref 37–54)
EGFRCR SERPLBLD CKD-EPI 2021: 82.9 ML/MIN/1.73
EOSINOPHIL # BLD AUTO: 0.2 10*3/MM3 (ref 0–0.4)
EOSINOPHIL NFR BLD AUTO: 2.2 % (ref 0.3–6.2)
ERYTHROCYTE [DISTWIDTH] IN BLOOD BY AUTOMATED COUNT: 13.1 % (ref 12.3–15.4)
GLOBULIN UR ELPH-MCNC: 2.6 GM/DL
GLUCOSE SERPL-MCNC: 100 MG/DL (ref 65–99)
HBA1C MFR BLD: 5.5 % (ref 4.8–5.6)
HCT VFR BLD AUTO: 44.3 % (ref 34–46.6)
HGB BLD-MCNC: 14.1 G/DL (ref 12–15.9)
IMM GRANULOCYTES # BLD AUTO: 0.03 10*3/MM3 (ref 0–0.05)
IMM GRANULOCYTES NFR BLD AUTO: 0.3 % (ref 0–0.5)
INR PPP: 0.99 (ref 0.89–1.12)
LYMPHOCYTES # BLD AUTO: 2.6 10*3/MM3 (ref 0.7–3.1)
LYMPHOCYTES NFR BLD AUTO: 28.3 % (ref 19.6–45.3)
MCH RBC QN AUTO: 29.1 PG (ref 26.6–33)
MCHC RBC AUTO-ENTMCNC: 31.8 G/DL (ref 31.5–35.7)
MCV RBC AUTO: 91.5 FL (ref 79–97)
MONOCYTES # BLD AUTO: 0.64 10*3/MM3 (ref 0.1–0.9)
MONOCYTES NFR BLD AUTO: 7 % (ref 5–12)
NEUTROPHILS NFR BLD AUTO: 5.64 10*3/MM3 (ref 1.7–7)
NEUTROPHILS NFR BLD AUTO: 61.3 % (ref 42.7–76)
NRBC BLD AUTO-RTO: 0 /100 WBC (ref 0–0.2)
PLATELET # BLD AUTO: 336 10*3/MM3 (ref 140–450)
PMV BLD AUTO: 9.8 FL (ref 6–12)
POTASSIUM SERPL-SCNC: 4 MMOL/L (ref 3.5–5.2)
PROT SERPL-MCNC: 6.8 G/DL (ref 6–8.5)
PROTHROMBIN TIME: 13.2 SECONDS (ref 12.2–14.5)
RBC # BLD AUTO: 4.84 10*6/MM3 (ref 3.77–5.28)
SODIUM SERPL-SCNC: 135 MMOL/L (ref 136–145)
WBC NRBC COR # BLD AUTO: 9.19 10*3/MM3 (ref 3.4–10.8)

## 2024-05-15 PROCEDURE — 36415 COLL VENOUS BLD VENIPUNCTURE: CPT

## 2024-05-15 PROCEDURE — 85025 COMPLETE CBC W/AUTO DIFF WBC: CPT

## 2024-05-15 PROCEDURE — 80053 COMPREHEN METABOLIC PANEL: CPT

## 2024-05-15 PROCEDURE — 85730 THROMBOPLASTIN TIME PARTIAL: CPT

## 2024-05-15 PROCEDURE — 83036 HEMOGLOBIN GLYCOSYLATED A1C: CPT

## 2024-05-15 PROCEDURE — 85610 PROTHROMBIN TIME: CPT

## 2024-05-15 RX ORDER — FAMOTIDINE 10 MG/ML
20 INJECTION, SOLUTION INTRAVENOUS ONCE
Status: CANCELLED | OUTPATIENT
Start: 2024-05-15 | End: 2024-05-15

## 2024-05-15 RX ORDER — SODIUM CHLORIDE 0.9 % (FLUSH) 0.9 %
10 SYRINGE (ML) INJECTION EVERY 12 HOURS SCHEDULED
Status: CANCELLED | OUTPATIENT
Start: 2024-05-15

## 2024-05-15 NOTE — PAT
An arrival time for procedure was not provided during PAT visit. If patient had any questions or concerns about their arrival time, they were instructed to contact their surgeon/physician.  Additionally, if the patient referred to an arrival time that was acquired from their my chart account, patient was encouraged to verify that time with their surgeon/physician. Arrival times are NOT provided in Pre Admission Testing Department.    Patient viewed general PAT education video as instructed in their preoperative information received from their surgeon.  Patient stated the general PAT education video was viewed in its entirety and survey completed.  Copies of PAT general education handouts (Incentive Spirometry, Meds to Beds Program, Patient Belongings, Pre-op skin preparation instructions, Blood Glucose testing, Visitor policy, Surgery FAQ, Code H) distributed to patient if not printed. Education related to the PAT pass and skin preparation for surgery (if applicable) completed in PAT as a reinforcement to PAT education video. Patient instructed to return PAT pass provided today as well as completed skin preparation sheet (if applicable) on the day of procedure.     Additionally if patient had not viewed video yet but intended to view it at home or in our waiting area, then referred them to the handout with QR code/link provided during PAT visit.  Instructed patient to complete survey after viewing the video in its entirety.  Encouraged patient/family to read PAT general education handouts thoroughly and notify PAT staff with any questions or concerns. Patient verbalized understanding of all information and priority content.    Patient denies any current skin issues.     Patient's surgeon called in a prescription for Benzol Peroxide 5% wash to [facility].  Patient instructed to  from [facility] pharmacy that was submitted electronically.  Verbal and written instructions given regarding proper use of the  Benzoyl Peroxide wash were provided to patient and/or laceylily during PAT visit. Patient/family also instructed to complete Benzol Peroxide checklist and return it to Pre-op on the day of surgery.  Patient and/or family verbalized understanding.      Additionally, reinforced with patient to acquire this prescription from the [facility] retail pharmacy before leaving the hospital after PAT visit due to the potential unavailability at local pharmacies.    Patient instructed to drink 20 ounces of Gatorade or Gatorlyte (if diabetic) and it needs to be completed 1 hour (for Main OR patients) or 2 hours (scheduled  section & [facility] patients) before given arrival time for procedure (NO RED Gatorade and NO Gatorade Zero).    Patient verbalized understanding.    [employer] (by [employer]) pain pump patient informational handout given to patient.  Instructed patient to watch [employer] Patient Education Video regarding Peripheral Nerve Catheter that will be in place for upcoming surgery unless contraindicated. The video can be accessed using QR code noted on handout.  Patient agreed to watch video.  Stressed to patient to call [employer] Nursing Hotline  if patient has any questions or concerns after discharge.

## 2024-05-15 NOTE — ANESTHESIA PREPROCEDURE EVALUATION
Anesthesia Evaluation     Patient summary reviewed and Nursing notes reviewed   no history of anesthetic complications:   NPO Solid Status: > 8 hours  NPO Liquid Status: > 2 hours           Airway   Mallampati: II  TM distance: >3 FB  Neck ROM: full  Possible difficult intubation and Small opening  Dental    (+) upper dentures    Pulmonary - normal exam   (+) a smoker Former, asthma (per emr, no hx of same per pt),  Cardiovascular - normal exam  Exercise tolerance: good (4-7 METS)    ECG reviewed    (+) hyperlipidemia  (-) dysrhythmias, angina, CHF, cardiac stents, DVT      Neuro/Psych  (+) headaches  (-) seizures, neuromuscular disease  GI/Hepatic/Renal/Endo    (-) GERD, liver disease, no renal disease, diabetes, no thyroid disorder    Musculoskeletal     Abdominal    Substance History - negative use     OB/GYN          Other   arthritis,     ROS/Med Hx Other: 1/2024-mac3 gr1v; uneventful anesthetic course  Hgb 14.1 k 4.0  Hx lap colectomy 2/2 CV fistula  Glaucoma  Denies bleeding/clotting disorders      Phys Exam Other: Retrognathic  Upper denture, lower partial + lower caps            Anesthesia Plan    ASA 2     general with block     (Risks and benefits of general anesthesia discussed with patient (including MI, CVA, death, recall, aspiration, oropharyngeal/dental damage), questions answered, agreeable to proceed.    Benefits and risks of ISB nerve block/catheter discussed with patient ((including failed block, damage to nerve/nearby structures, intravascular injection)); questions answered, agreeable to proceed.    VL ready  )  intravenous induction     Anesthetic plan, risks, benefits, and alternatives have been provided, discussed and informed consent has been obtained with: patient.    Use of blood products discussed with patient  Consented to blood products.    Plan discussed with CRNA.    CODE STATUS:

## 2024-05-16 ENCOUNTER — ANESTHESIA EVENT CONVERTED (OUTPATIENT)
Dept: ANESTHESIOLOGY | Facility: HOSPITAL | Age: 74
End: 2024-05-16
Payer: MEDICARE

## 2024-05-16 ENCOUNTER — APPOINTMENT (OUTPATIENT)
Dept: GENERAL RADIOLOGY | Facility: HOSPITAL | Age: 74
End: 2024-05-16
Payer: MEDICARE

## 2024-05-16 ENCOUNTER — ANESTHESIA (OUTPATIENT)
Dept: PERIOP | Facility: HOSPITAL | Age: 74
End: 2024-05-16
Payer: MEDICARE

## 2024-05-16 ENCOUNTER — HOSPITAL ENCOUNTER (OUTPATIENT)
Facility: HOSPITAL | Age: 74
Discharge: HOME | End: 2024-05-16
Attending: ORTHOPAEDIC SURGERY | Admitting: ORTHOPAEDIC SURGERY
Payer: MEDICARE

## 2024-05-16 VITALS
BODY MASS INDEX: 26.23 KG/M2 | RESPIRATION RATE: 14 BRPM | SYSTOLIC BLOOD PRESSURE: 114 MMHG | DIASTOLIC BLOOD PRESSURE: 64 MMHG | HEART RATE: 86 BPM | WEIGHT: 153.66 LBS | HEIGHT: 64 IN | TEMPERATURE: 97.7 F | OXYGEN SATURATION: 95 %

## 2024-05-16 DIAGNOSIS — Z96.611 S/P REVERSE TOTAL SHOULDER ARTHROPLASTY, RIGHT: Primary | ICD-10-CM

## 2024-05-16 PROBLEM — M19.011 GLENOHUMERAL ARTHRITIS, RIGHT: Status: ACTIVE | Noted: 2024-05-16

## 2024-05-16 LAB — GLUCOSE BLDC GLUCOMTR-MCNC: 70 MG/DL (ref 70–130)

## 2024-05-16 PROCEDURE — 97110 THERAPEUTIC EXERCISES: CPT

## 2024-05-16 PROCEDURE — G0378 HOSPITAL OBSERVATION PER HR: HCPCS

## 2024-05-16 PROCEDURE — 97535 SELF CARE MNGMENT TRAINING: CPT

## 2024-05-16 PROCEDURE — 25810000003 LACTATED RINGERS PER 1000 ML: Performed by: ANESTHESIOLOGY

## 2024-05-16 PROCEDURE — C1776 JOINT DEVICE (IMPLANTABLE): HCPCS | Performed by: ORTHOPAEDIC SURGERY

## 2024-05-16 PROCEDURE — 25010000002 VANCOMYCIN 1 G RECONSTITUTED SOLUTION: Performed by: ORTHOPAEDIC SURGERY

## 2024-05-16 PROCEDURE — 73020 X-RAY EXAM OF SHOULDER: CPT

## 2024-05-16 PROCEDURE — 97161 PT EVAL LOW COMPLEX 20 MIN: CPT

## 2024-05-16 PROCEDURE — 25010000002 DEXAMETHASONE PER 1 MG

## 2024-05-16 PROCEDURE — 25010000002 PROPOFOL 10 MG/ML EMULSION

## 2024-05-16 PROCEDURE — C1889 IMPLANT/INSERT DEVICE, NOC: HCPCS | Performed by: ORTHOPAEDIC SURGERY

## 2024-05-16 PROCEDURE — 25010000002 ROPIVACAINE HCL-NACL 0.2-0.9 % SOLUTION: Performed by: NURSE ANESTHETIST, CERTIFIED REGISTERED

## 2024-05-16 PROCEDURE — 97165 OT EVAL LOW COMPLEX 30 MIN: CPT

## 2024-05-16 PROCEDURE — 25010000002 BUPIVACAINE (PF) 0.25 % SOLUTION: Performed by: NURSE ANESTHETIST, CERTIFIED REGISTERED

## 2024-05-16 PROCEDURE — 82948 REAGENT STRIP/BLOOD GLUCOSE: CPT

## 2024-05-16 PROCEDURE — 25010000002 SUGAMMADEX 200 MG/2ML SOLUTION

## 2024-05-16 PROCEDURE — 25010000002 ONDANSETRON PER 1 MG

## 2024-05-16 PROCEDURE — 25010000002 FENTANYL CITRATE (PF) 50 MCG/ML SOLUTION: Performed by: NURSE ANESTHETIST, CERTIFIED REGISTERED

## 2024-05-16 PROCEDURE — 25010000002 CEFAZOLIN PER 500 MG: Performed by: ORTHOPAEDIC SURGERY

## 2024-05-16 PROCEDURE — 25010000002 CEFAZOLIN PER 500 MG

## 2024-05-16 DEVICE — IMPLANTABLE DEVICE: Type: IMPLANTABLE DEVICE | Site: SHOULDER | Status: FUNCTIONAL

## 2024-05-16 DEVICE — ABSORBABLE HEMOSTAT (OXIDIZED REGENERATED CELLULOSE)
Type: IMPLANTABLE DEVICE | Site: SHOULDER | Status: FUNCTIONAL
Brand: SURGICEL

## 2024-05-16 DEVICE — SUT FW #2 W/TPR NDL 1/2 CIR 38IN 97CM 26.5MM BLU: Type: IMPLANTABLE DEVICE | Site: SHOULDER | Status: FUNCTIONAL

## 2024-05-16 DEVICE — SCRW AEQUALIS PERFORM PERIPH 5X22MM: Type: IMPLANTABLE DEVICE | Site: SHOULDER | Status: FUNCTIONAL

## 2024-05-16 DEVICE — STEM HUM/SHLDR PREFORM SHRT PLS SZ2 34X65X8MM: Type: IMPLANTABLE DEVICE | Site: SHOULDER | Status: FUNCTIONAL

## 2024-05-16 DEVICE — IMPLANTABLE DEVICE
Type: IMPLANTABLE DEVICE | Site: SHOULDER | Status: FUNCTIONAL
Brand: TORNIER PERFORM® REVERSED AUGMENTED GLENOID

## 2024-05-16 DEVICE — SCRW PERIPH 5X34MM: Type: IMPLANTABLE DEVICE | Site: SHOULDER | Status: FUNCTIONAL

## 2024-05-16 DEVICE — SCRW AEQUALIS PERFORM PERIPH 5X30MM: Type: IMPLANTABLE DEVICE | Site: SHOULDER | Status: FUNCTIONAL

## 2024-05-16 RX ORDER — SODIUM CHLORIDE, SODIUM LACTATE, POTASSIUM CHLORIDE, CALCIUM CHLORIDE 600; 310; 30; 20 MG/100ML; MG/100ML; MG/100ML; MG/100ML
9 INJECTION, SOLUTION INTRAVENOUS CONTINUOUS
Status: DISCONTINUED | OUTPATIENT
Start: 2024-05-16 | End: 2024-05-16 | Stop reason: HOSPADM

## 2024-05-16 RX ORDER — VANCOMYCIN HYDROCHLORIDE 1 G/20ML
INJECTION, POWDER, LYOPHILIZED, FOR SOLUTION INTRAVENOUS AS NEEDED
Status: DISCONTINUED | OUTPATIENT
Start: 2024-05-16 | End: 2024-05-16 | Stop reason: HOSPADM

## 2024-05-16 RX ORDER — OXYCODONE HYDROCHLORIDE 5 MG/1
5 TABLET ORAL EVERY 4 HOURS PRN
Status: DISCONTINUED | OUTPATIENT
Start: 2024-05-16 | End: 2024-05-16 | Stop reason: HOSPADM

## 2024-05-16 RX ORDER — TRANEXAMIC ACID 10 MG/ML
1000 INJECTION, SOLUTION INTRAVENOUS ONCE
Status: COMPLETED | OUTPATIENT
Start: 2024-05-16 | End: 2024-05-16

## 2024-05-16 RX ORDER — DROPERIDOL 2.5 MG/ML
0.62 INJECTION, SOLUTION INTRAMUSCULAR; INTRAVENOUS ONCE AS NEEDED
Status: DISCONTINUED | OUTPATIENT
Start: 2024-05-16 | End: 2024-05-16 | Stop reason: HOSPADM

## 2024-05-16 RX ORDER — EPHEDRINE SULFATE 50 MG/ML
INJECTION INTRAVENOUS AS NEEDED
Status: DISCONTINUED | OUTPATIENT
Start: 2024-05-16 | End: 2024-05-16 | Stop reason: SURG

## 2024-05-16 RX ORDER — SODIUM CHLORIDE 0.9 % (FLUSH) 0.9 %
10 SYRINGE (ML) INJECTION AS NEEDED
Status: DISCONTINUED | OUTPATIENT
Start: 2024-05-16 | End: 2024-05-16 | Stop reason: HOSPADM

## 2024-05-16 RX ORDER — ROPIVACAINE HYDROCHLORIDE 2 MG/ML
INJECTION, SOLUTION EPIDURAL; INFILTRATION; PERINEURAL CONTINUOUS
Status: DISCONTINUED | OUTPATIENT
Start: 2024-05-16 | End: 2024-05-16 | Stop reason: HOSPADM

## 2024-05-16 RX ORDER — LIDOCAINE HYDROCHLORIDE 10 MG/ML
INJECTION, SOLUTION EPIDURAL; INFILTRATION; INTRACAUDAL; PERINEURAL AS NEEDED
Status: DISCONTINUED | OUTPATIENT
Start: 2024-05-16 | End: 2024-05-16 | Stop reason: SURG

## 2024-05-16 RX ORDER — ACETAMINOPHEN 500 MG
1000 TABLET ORAL ONCE
Status: COMPLETED | OUTPATIENT
Start: 2024-05-16 | End: 2024-05-16

## 2024-05-16 RX ORDER — SODIUM CHLORIDE 9 MG/ML
INJECTION, SOLUTION INTRAVENOUS
Status: DISCONTINUED
Start: 2024-05-16 | End: 2024-05-16 | Stop reason: HOSPADM

## 2024-05-16 RX ORDER — DOCUSATE SODIUM 100 MG/1
100 CAPSULE, LIQUID FILLED ORAL 2 TIMES DAILY
Qty: 60 CAPSULE | Refills: 0 | Status: SHIPPED | OUTPATIENT
Start: 2024-05-16

## 2024-05-16 RX ORDER — LIDOCAINE HYDROCHLORIDE 10 MG/ML
0.5 INJECTION, SOLUTION EPIDURAL; INFILTRATION; INTRACAUDAL; PERINEURAL ONCE AS NEEDED
Status: COMPLETED | OUTPATIENT
Start: 2024-05-16 | End: 2024-05-16

## 2024-05-16 RX ORDER — SODIUM CHLORIDE 450 MG/100ML
50 INJECTION, SOLUTION INTRAVENOUS CONTINUOUS
Status: CANCELLED | OUTPATIENT
Start: 2024-05-16

## 2024-05-16 RX ORDER — ONDANSETRON 2 MG/ML
4 INJECTION INTRAMUSCULAR; INTRAVENOUS EVERY 6 HOURS PRN
Status: CANCELLED | OUTPATIENT
Start: 2024-05-16

## 2024-05-16 RX ORDER — HYDROMORPHONE HYDROCHLORIDE 1 MG/ML
0.5 INJECTION, SOLUTION INTRAMUSCULAR; INTRAVENOUS; SUBCUTANEOUS
Status: DISCONTINUED | OUTPATIENT
Start: 2024-05-16 | End: 2024-05-16 | Stop reason: HOSPADM

## 2024-05-16 RX ORDER — ONDANSETRON 2 MG/ML
4 INJECTION INTRAMUSCULAR; INTRAVENOUS EVERY 6 HOURS PRN
OUTPATIENT
Start: 2024-05-16

## 2024-05-16 RX ORDER — FENTANYL CITRATE 50 UG/ML
INJECTION, SOLUTION INTRAMUSCULAR; INTRAVENOUS AS NEEDED
Status: DISCONTINUED | OUTPATIENT
Start: 2024-05-16 | End: 2024-05-16 | Stop reason: SURG

## 2024-05-16 RX ORDER — SODIUM CHLORIDE 9 MG/ML
40 INJECTION, SOLUTION INTRAVENOUS AS NEEDED
Status: DISCONTINUED | OUTPATIENT
Start: 2024-05-16 | End: 2024-05-16 | Stop reason: HOSPADM

## 2024-05-16 RX ORDER — OXYCODONE HYDROCHLORIDE 5 MG/1
5 TABLET ORAL EVERY 4 HOURS PRN
Qty: 35 TABLET | Refills: 0 | Status: SHIPPED | OUTPATIENT
Start: 2024-05-16 | End: 2024-05-22

## 2024-05-16 RX ORDER — BUPIVACAINE HYDROCHLORIDE 2.5 MG/ML
INJECTION, SOLUTION EPIDURAL; INFILTRATION; INTRACAUDAL
Status: COMPLETED | OUTPATIENT
Start: 2024-05-16 | End: 2024-05-16

## 2024-05-16 RX ORDER — ACETAMINOPHEN 325 MG/1
650 TABLET ORAL EVERY 4 HOURS PRN
Status: CANCELLED | OUTPATIENT
Start: 2024-05-16

## 2024-05-16 RX ORDER — PREGABALIN 75 MG/1
75 CAPSULE ORAL ONCE
Status: COMPLETED | OUTPATIENT
Start: 2024-05-16 | End: 2024-05-16

## 2024-05-16 RX ORDER — PHENYLEPHRINE HCL IN 0.9% NACL 1 MG/10 ML
SYRINGE (ML) INTRAVENOUS AS NEEDED
Status: DISCONTINUED | OUTPATIENT
Start: 2024-05-16 | End: 2024-05-16 | Stop reason: SURG

## 2024-05-16 RX ORDER — TRANEXAMIC ACID 10 MG/ML
1000 INJECTION, SOLUTION INTRAVENOUS ONCE
Status: DISCONTINUED | OUTPATIENT
Start: 2024-05-16 | End: 2024-05-16 | Stop reason: HOSPADM

## 2024-05-16 RX ORDER — DEXAMETHASONE SODIUM PHOSPHATE 4 MG/ML
INJECTION, SOLUTION INTRA-ARTICULAR; INTRALESIONAL; INTRAMUSCULAR; INTRAVENOUS; SOFT TISSUE AS NEEDED
Status: DISCONTINUED | OUTPATIENT
Start: 2024-05-16 | End: 2024-05-16 | Stop reason: SURG

## 2024-05-16 RX ORDER — ROCURONIUM BROMIDE 10 MG/ML
INJECTION, SOLUTION INTRAVENOUS AS NEEDED
Status: DISCONTINUED | OUTPATIENT
Start: 2024-05-16 | End: 2024-05-16 | Stop reason: SURG

## 2024-05-16 RX ORDER — PROPOFOL 10 MG/ML
VIAL (ML) INTRAVENOUS AS NEEDED
Status: DISCONTINUED | OUTPATIENT
Start: 2024-05-16 | End: 2024-05-16 | Stop reason: SURG

## 2024-05-16 RX ORDER — LABETALOL HYDROCHLORIDE 5 MG/ML
10 INJECTION, SOLUTION INTRAVENOUS EVERY 4 HOURS PRN
OUTPATIENT
Start: 2024-05-16

## 2024-05-16 RX ORDER — NALOXONE HCL 0.4 MG/ML
0.1 VIAL (ML) INJECTION
Status: CANCELLED | OUTPATIENT
Start: 2024-05-16

## 2024-05-16 RX ORDER — ROPIVACAINE HYDROCHLORIDE 2 MG/ML
1 INJECTION, SOLUTION EPIDURAL; INFILTRATION; PERINEURAL CONTINUOUS
Start: 2024-05-16

## 2024-05-16 RX ORDER — OXYCODONE HYDROCHLORIDE 5 MG/1
5 TABLET ORAL EVERY 4 HOURS PRN
Status: CANCELLED | OUTPATIENT
Start: 2024-05-16 | End: 2024-05-26

## 2024-05-16 RX ORDER — ONDANSETRON 4 MG/1
4 TABLET, ORALLY DISINTEGRATING ORAL EVERY 6 HOURS PRN
Status: CANCELLED | OUTPATIENT
Start: 2024-05-16

## 2024-05-16 RX ORDER — FENTANYL CITRATE 50 UG/ML
50 INJECTION, SOLUTION INTRAMUSCULAR; INTRAVENOUS
Status: DISCONTINUED | OUTPATIENT
Start: 2024-05-16 | End: 2024-05-16 | Stop reason: HOSPADM

## 2024-05-16 RX ORDER — FAMOTIDINE 20 MG/1
20 TABLET, FILM COATED ORAL ONCE
Status: COMPLETED | OUTPATIENT
Start: 2024-05-16 | End: 2024-05-16

## 2024-05-16 RX ORDER — FENTANYL CITRATE 50 UG/ML
INJECTION, SOLUTION INTRAMUSCULAR; INTRAVENOUS
Status: COMPLETED | OUTPATIENT
Start: 2024-05-16 | End: 2024-05-16

## 2024-05-16 RX ORDER — ONDANSETRON 2 MG/ML
INJECTION INTRAMUSCULAR; INTRAVENOUS AS NEEDED
Status: DISCONTINUED | OUTPATIENT
Start: 2024-05-16 | End: 2024-05-16 | Stop reason: SURG

## 2024-05-16 RX ORDER — ROSUVASTATIN CALCIUM 10 MG/1
5 TABLET, COATED ORAL NIGHTLY
Status: DISCONTINUED | OUTPATIENT
Start: 2024-05-16 | End: 2024-05-16 | Stop reason: HOSPADM

## 2024-05-16 RX ORDER — MIDAZOLAM HYDROCHLORIDE 1 MG/ML
0.5 INJECTION INTRAMUSCULAR; INTRAVENOUS
Status: DISCONTINUED | OUTPATIENT
Start: 2024-05-16 | End: 2024-05-16 | Stop reason: HOSPADM

## 2024-05-16 RX ORDER — ALBUTEROL SULFATE 90 UG/1
1 AEROSOL, METERED RESPIRATORY (INHALATION) DAILY
Status: DISCONTINUED | OUTPATIENT
Start: 2024-05-16 | End: 2024-05-16 | Stop reason: HOSPADM

## 2024-05-16 RX ADMIN — ROCURONIUM BROMIDE 50 MG: 10 INJECTION INTRAVENOUS at 09:41

## 2024-05-16 RX ADMIN — Medication 200 MCG: at 09:45

## 2024-05-16 RX ADMIN — ONDANSETRON 4 MG: 2 INJECTION INTRAMUSCULAR; INTRAVENOUS at 11:00

## 2024-05-16 RX ADMIN — SODIUM CHLORIDE 2000 MG: 900 INJECTION INTRAVENOUS at 14:27

## 2024-05-16 RX ADMIN — FENTANYL CITRATE 100 MCG: 50 INJECTION, SOLUTION INTRAMUSCULAR; INTRAVENOUS at 08:24

## 2024-05-16 RX ADMIN — ACETAMINOPHEN 1000 MG: 500 TABLET ORAL at 08:13

## 2024-05-16 RX ADMIN — TRANEXAMIC ACID 1000 MG: 10 INJECTION, SOLUTION INTRAVENOUS at 09:49

## 2024-05-16 RX ADMIN — PREGABALIN 75 MG: 75 CAPSULE ORAL at 08:13

## 2024-05-16 RX ADMIN — BUPIVACAINE HYDROCHLORIDE 15 ML: 2.5 INJECTION, SOLUTION EPIDURAL; INFILTRATION; INTRACAUDAL; PERINEURAL at 08:20

## 2024-05-16 RX ADMIN — EPHEDRINE SULFATE 10 MG: 50 INJECTION INTRAVENOUS at 10:32

## 2024-05-16 RX ADMIN — Medication 1000 MG: at 11:38

## 2024-05-16 RX ADMIN — SODIUM CHLORIDE 2000 MG: 900 INJECTION INTRAVENOUS at 09:46

## 2024-05-16 RX ADMIN — SUGAMMADEX 200 MG: 100 INJECTION, SOLUTION INTRAVENOUS at 11:02

## 2024-05-16 RX ADMIN — PROPOFOL 150 MG: 10 INJECTION, EMULSION INTRAVENOUS at 09:40

## 2024-05-16 RX ADMIN — SODIUM CHLORIDE, POTASSIUM CHLORIDE, SODIUM LACTATE AND CALCIUM CHLORIDE 9 ML/HR: 600; 310; 30; 20 INJECTION, SOLUTION INTRAVENOUS at 08:13

## 2024-05-16 RX ADMIN — Medication 150 MCG: at 09:50

## 2024-05-16 RX ADMIN — BUPIVACAINE HYDROCHLORIDE 15 ML: 2.5 INJECTION, SOLUTION EPIDURAL; INFILTRATION; INTRACAUDAL at 08:24

## 2024-05-16 RX ADMIN — LIDOCAINE HYDROCHLORIDE 0.5 ML: 10 INJECTION, SOLUTION EPIDURAL; INFILTRATION; INTRACAUDAL; PERINEURAL at 08:13

## 2024-05-16 RX ADMIN — TRANEXAMIC ACID 1000 MG: 10 INJECTION, SOLUTION INTRAVENOUS at 10:55

## 2024-05-16 RX ADMIN — Medication 100 MCG: at 10:05

## 2024-05-16 RX ADMIN — LIDOCAINE HYDROCHLORIDE 50 MG: 10 INJECTION, SOLUTION EPIDURAL; INFILTRATION; INTRACAUDAL; PERINEURAL at 09:40

## 2024-05-16 RX ADMIN — FENTANYL CITRATE 100 MCG: 50 INJECTION, SOLUTION INTRAMUSCULAR; INTRAVENOUS at 09:40

## 2024-05-16 RX ADMIN — FAMOTIDINE 20 MG: 20 TABLET, FILM COATED ORAL at 08:13

## 2024-05-16 RX ADMIN — EPHEDRINE SULFATE 10 MG: 50 INJECTION INTRAVENOUS at 09:50

## 2024-05-16 RX ADMIN — DEXAMETHASONE SODIUM PHOSPHATE 4 MG: 4 INJECTION INTRA-ARTICULAR; INTRALESIONAL; INTRAMUSCULAR; INTRAVENOUS; SOFT TISSUE at 09:40

## 2024-05-16 RX ADMIN — Medication 150 MCG: at 10:29

## 2024-05-16 NOTE — ANESTHESIA PROCEDURE NOTES
"Peripheral Block      Patient reassessed immediately prior to procedure    Patient location during procedure: OR  Reason for block: at surgeon's request and post-op pain management  Performed by  CRNA/CAA: Nadir Rolon CRNA  Preanesthetic Checklist  Completed: patient identified, IV checked, site marked, risks and benefits discussed, surgical consent, monitors and equipment checked, pre-op evaluation and timeout performed  Prep:  Pt Position: supine  Sterile barriers:cap, gloves, mask and washed/disinfected hands  Prep: ChloraPrep  Patient monitoring: blood pressure monitoring, continuous pulse oximetry and EKG  Procedure  Performed under: general  Guidance:ultrasound guided and landmark technique  Images:still images obtained, printed/placed on chart    Laterality:right  Block Type:PECS I and PECS II  Injection Technique:single-shot  Needle Type:short-bevel  Needle Gauge:20 G  Resistance on Injection: none    Medications Used: bupivacaine PF (MARCAINE) 0.25 % injection - Injection   15 mL - 5/16/2024 8:24:00 AM      Medications  Preservative Free Saline:10ml  Comment:      Post Assessment  Injection Assessment: negative aspiration for heme, incremental injection and no paresthesia on injection  Patient Tolerance:comfortable throughout block  Complications:no  Additional Notes  Interpectoral-Pectoserratus Plane   A high-frequency linear transducer, with sterile cover, was placed medial to the coracoid process in the paramedian sagittal plane. The transducer was moved caudally to the 4th rib and rotated slightly to allow an in-plane needle trajectory from medial to lateral. Pectoralis Major Muscle (PMM), Pectoralis Minor Muscle (PmM), Thoracoacromial Artery, Ribs, and Pleura were identified under ultrasound. The insertion site was prepped in sterile fashion and then localized with 2-5 ml of 1% Lidocaine. Using ultrasound-guidance, a 20-gauge B-Mariscal 4\" Ultraplex 360 non-stimulating echogenic needle was advanced in " plane until the tip of the needle was in the fascial plane between the PMM and PmM, lateral to the Thoracoacromial Artery. 1-3ml of preservative free normal saline was used to hydro-dissect the fascial planes. After the fascial plane was verified, 10ml local anesthetic (LA) was injected for Interpectoral fascial plane block. The needle was continued along the same path to the level of the 4th rib below PmM.  Initially preservative free normal saline was used to confirm needle position and then 20 ml of LA was injected for Pectoserratus fascial plane block. Aspiration every 5 ml to prevent intravascular injection. Injection was completed with negative aspiration of blood and negative intravascular injection. Injection pressures were normal with minimal resistance.

## 2024-05-16 NOTE — THERAPY EVALUATION
Patient Name: Racheal De La Vega  : 1950    MRN: [id number]                              Today's Date: 2024       Admit Date: 2024    Visit Dx:     ICD-10-CM ICD-9-CM   1. S/P reverse total shoulder arthroplasty, right  Z96.611 V43.61     Patient Active Problem List   Diagnosis    Primary localized osteoarthrosis of left shoulder region    Bursitis of left shoulder    Impingement syndrome of left shoulder    Glenohumeral arthritis, left    Left shoulder pain    S/P reverse total shoulder arthroplasty, left    Postoperative pain    Colovaginal fistula    Hyperlipidemia    Status post laparoscopic colectomy, robotic LAR, lysis of adhesions with colovaginal fistula takedown    Leukocytosis, likely reactive    Glenohumeral arthritis, right    S/P reverse total shoulder arthroplasty, right     Past Medical History:   Diagnosis Date    Asthma     Colon polyp     Diverticulosis     Glaucoma     Headache     Hyperlipidemia      Past Surgical History:   Procedure Laterality Date    BLADDER SURGERY      PROLAPSED BLADDER    CATARACT EXTRACTION, BILATERAL      COLON RESECTION N/A 2024    Procedure: ROBOTIC LOW ANTERIOR RESECTION, TRANSANAL EXTRACTION OF SPECIMEN, TAKEDOWN OF COLOVAGINAL FISTULA;  Surgeon: Prachi Helton MD;  [facility];  Service: Robotics - [facility];  Laterality: N/A;    COLONOSCOPY      COLOVAGINAL FISTULA REPAIR      CYSTOSCOPY N/A 2024    Procedure: CYSTOSCOPY TEMPORARY PLACEMENT BILATERAL URETERAL CATHETER;  Surgeon: Yordan Reynoso MD;  Location:  [facility];  Service: Robotics - [place];  Laterality: N/A;    GLAUCOMA SURGERY Bilateral     JOINT REPLACEMENT Left     SHOULDER    RETINAL DETACHMENT SURGERY Bilateral     TOE SURGERY Left     FIRST TOE    TOTAL KNEE ARTHROPLASTY Right 2014    TOTAL SHOULDER ARTHROPLASTY Left 2022    Procedure: TOTAL REVERSE SHOULDER ARTHROPLASTY LEFT;  Surgeon: Alen Santacruz MD;  Location:  [facility];  Service:  Orthopedics;  Laterality: Left;      General Information       Row Name 05/16/24 1654          Physical Therapy Time and Intention    Document Type evaluation  -AB     Mode of Treatment physical therapy  -AB       Row Name 05/16/24 1654          General Information    Patient Profile Reviewed yes  -AB     Prior Level of Function independent:;all household mobility;community mobility;gait;transfer;bed mobility;ADL's  -AB     Existing Precautions/Restrictions fall;non-weight bearing;brace on at all times;right;shoulder  RUE NWB in abduction sling, interscalene nerve cath  -AB     Barriers to Rehab medically complex  -AB       Row Name 05/16/24 1654          Living Environment    People in Home spouse  -AB       Row Name 05/16/24 1654          Home Main Entrance    Number of Stairs, Main Entrance none  -AB       Row Name 05/16/24 1654          Stairs Within Home, Primary    Number of Stairs, Within Home, Primary none  -AB       Row Name 05/16/24 1654          Cognition    Orientation Status (Cognition) oriented x 4  -AB       Row Name 05/16/24 1654          Safety Issues, Functional Mobility    Safety Issues Affecting Function (Mobility) insight into deficits/self-awareness;safety precaution awareness;safety precautions follow-through/compliance  -AB     Impairments Affecting Function (Mobility) balance;strength;endurance/activity tolerance  -AB               User Key  (r) = Recorded By, (t) = Taken By, (c) = Cosigned By      Initials Name Provider Type    AB Fina Mckeon PT Physical Therapist                   Mobility       Row Name 05/16/24 1655          Bed Mobility    Bed Mobility supine-sit;sit-supine;scooting/bridging  -AB     Scooting/Bridging [place] (Bed Mobility) standby assist  -AB     Supine-Sit [place] (Bed Mobility) standby assist  -AB     Sit-Supine [place] (Bed Mobility) standby assist  -AB     Assistive Device (Bed Mobility) head of bed elevated  -AB     Comment, (Bed Mobility) no  issues noted  -AB       Row Name 05/16/24 1655          Transfers    Comment, (Transfers) Cues for hand placement and sequencing. SPC issued.  -AB       Row Name 05/16/24 1655          Sit-Stand Transfer    Sit-Stand [place] (Transfers) contact guard;1 person assist;verbal cues  -AB     Assistive Device (Sit-Stand Transfers) cane, straight  -AB     Comment, (Sit-Stand Transfer) Pt with initial mild unsteadiness with standing. Improved with time and cane use.  -AB       Row Name 05/16/24 1655          Gait/Stairs (Locomotion)    [place] Level (Gait) contact guard;1 person assist;verbal cues  -AB     Assistive Device (Gait) cane, straight  -AB     Patient was able to Ambulate yes  -AB     Distance in Feet (Gait) 200  -AB     Deviations/Abnormal Patterns (Gait) bilateral deviations;fatmata decreased;gait speed decreased;stride length decreased  -AB     Bilateral Gait Deviations heel strike decreased  -AB     [place] Level (Stairs) not tested  -AB     Comment, (Gait/Stairs) Pt ambulated with step through gait pattern and slowed fatmata. Education provided on sequencing with cane and pt demonstrated excellent carryover and good stability. No overt LOB or knee buckling. Further activity limited by fatigue.  -AB       Row Name 05/16/24 1655          Mobility    Extremity Weight-bearing Status right upper extremity   -AB     Right Upper Extremity (Weight-bearing Status) non weight-bearing (NWB)   -AB               User Key  (r) = Recorded By, (t) = Taken By, (c) = Cosigned By      Initials Name Provider Type    AB Fina Mckeon, PT Physical Therapist                   Obj/Interventions       Row Name 05/16/24 1657          Range of Motion Comprehensive    General Range of Motion bilateral lower extremity ROM WNL  -AB       Row Name 05/16/24 1657          Strength Comprehensive (MMT)    General Manual Muscle Testing (MMT) Assessment lower extremity strength deficits identified  -AB     Comment, General  Manual Muscle Testing (MMT) Assessment BLE grossly 4/5  -AB       Row Name 05/16/24 1657          Balance    Balance Assessment sitting static balance;sitting dynamic balance;standing static balance;standing dynamic balance  -AB     Static Sitting Balance standby assist  -AB     Dynamic Sitting Balance standby assist  -AB     Position, Sitting Balance unsupported;sitting edge of bed  -AB     Static Standing Balance contact guard  -AB     Dynamic Standing Balance contact guard;1-person assist;verbal cues  -AB     Position/Device Used, Standing Balance supported;cane, straight  -AB     Balance Interventions sitting;standing;sit to stand;supported;static;dynamic;occupation based/functional task  -AB     Comment, Balance No overt LOB.  -AB       Row Name 05/16/24 1657          Sensory Assessment (Somatosensory)    Sensory Assessment (Somatosensory) LE sensation intact  -AB               User Key  (r) = Recorded By, (t) = Taken By, (c) = Cosigned By      Initials Name Provider Type    AB Fina Mckeon, PT Physical Therapist                   Goals/Plan       Row Name 05/16/24 1700          Bed Mobility Goal 1 (PT)    Activity/Assistive Device (Bed Mobility Goal 1, PT) sit to supine;supine to sit  -AB     [place] Level/Cues Needed (Bed Mobility Goal 1, PT) independent  -AB     Time Frame (Bed Mobility Goal 1, PT) long term goal (LTG);10 days  -AB       Row Name 05/16/24 1700          Transfer Goal 1 (PT)    Activity/Assistive Device (Transfer Goal 1, PT) sit-to-stand/stand-to-sit;bed-to-chair/chair-to-bed;cane, straight  -AB     [place] Level/Cues Needed (Transfer Goal 1, PT) modified independence  -AB     Time Frame (Transfer Goal 1, PT) long term goal (LTG);10 days  -AB       Row Name 05/16/24 1700          Gait Training Goal 1 (PT)    Activity/Assistive Device (Gait Training Goal 1, PT) gait (walking locomotion);assistive device use;cane, straight  -AB     [place] Level (Gait Training Goal 1, PT)  modified independence  -AB     Distance (Gait Training Goal 1, PT) 300  -AB     Time Frame (Gait Training Goal 1, PT) 10 days;long term goal (LTG)  -AB       Row Name 05/16/24 1700          Therapy Assessment/Plan (PT)    Planned Therapy Interventions (PT) balance training;bed mobility training;gait training;home exercise program;patient/family education;postural re-education;transfer training;stretching;strengthening;ROM (range of motion)  -AB               User Key  (r) = Recorded By, (t) = Taken By, (c) = Cosigned By      Initials Name Provider Type    AB Fina Mckeon, PT Physical Therapist                   Clinical Impression       Row Name 05/16/24 1651          Pain    Pretreatment Pain Rating 0/10 - no pain  -AB     Posttreatment Pain Rating 0/10 - no pain  -AB       Row Name 05/16/24 1659          Plan of Care Review    Plan of Care Reviewed With patient;spouse  -AB     Progress no change  -AB     Outcome Evaluation PT initial eval completed. Pt presents below her functional baseline with deficits related to NWB of RUE, impaired endurance, and mild balance deficits. PT issued SPC with education on sequencing, pt with excellent carryover. Ambulation of 200' with CGA and SPC was well tolerated. No overt LOB. PT rec d/c home with assist when medically appropriate.  -AB       Row Name 05/16/24 1651          Therapy Assessment/Plan (PT)    Rehab Potential (PT) good, to achieve stated therapy goals  -AB     Criteria for Skilled Interventions Met (PT) yes;meets criteria;skilled treatment is necessary  -AB     Therapy Frequency (PT) daily  -AB       Row Name 05/16/24 1656          Vital Signs    Pre SpO2 (%) 92  -AB     O2 Delivery Pre Treatment room air  -AB     O2 Delivery Intra Treatment room air  -AB     Post SpO2 (%) 94  -AB     O2 Delivery Post Treatment room air  -AB     Pre Patient Position Supine  -AB     Intra Patient Position Standing  -AB     Post Patient Position Supine  -AB       Row Name 05/16/24  1658          Positioning and Restraints    Pre-Treatment Position in bed  -AB     Post Treatment Position bed  -AB     In Bed notified nsg;supine;with family/caregiver  In PACU with RN at bedside  -AB               User Key  (r) = Recorded By, (t) = Taken By, (c) = Cosigned By      Initials Name Provider Type    Fina Peters, PT Physical Therapist                   Outcome Measures       Row Name 05/16/24 1701          How much help from another person do you currently need...    Turning from your back to your side while in flat bed without using bedrails? 4  -AB     Moving from lying on back to sitting on the side of a flat bed without bedrails? 3  -AB     Moving to and from a bed to a chair (including a wheelchair)? 3  -AB     Standing up from a chair using your arms (e.g., wheelchair, bedside chair)? 3  -AB     Climbing 3-5 steps with a railing? 3  -AB     To walk in hospital room? 3  -AB     AM-PAC 6 Clicks Score (PT) 19  -AB     Highest Level of Mobility Goal 6 --> Walk 10 steps or more  -AB       Row Name 05/16/24 1701 05/16/24 1634       Functional Assessment    Outcome Measure Options AM-PAC 6 Clicks Basic Mobility (PT)  -AB AM-PAC 6 Clicks Daily Activity (OT)  -AN              User Key  (r) = Recorded By, (t) = Taken By, (c) = Cosigned By      Initials Name Provider Type    Lawanda Li OT Occupational Therapist    Fina Peters, PT Physical Therapist                                 Physical Therapy Education       Title: PT OT SLP Therapies (In Progress)       Topic: Physical Therapy (In Progress)       Point: Mobility training (Done)       Learning Progress Summary             Patient Acceptance, D,E, VU,DU by AB at 5/16/2024 1701   Significant Other Acceptance, D,E, VU,DU by AB at 5/16/2024 1701                         Point: Home exercise program (Not Started)       Learner Progress:  Not documented in this visit.              Point: Body mechanics (Done)       Learning Progress  Summary             Patient Acceptance, D,E, VU,DU by AB at 5/16/2024 1701   Significant Other Acceptance, D,E, VU,DU by AB at 5/16/2024 1701                         Point: Precautions (Done)       Learning Progress Summary             Patient Acceptance, D,E, VU,DU by AB at 5/16/2024 1701   Significant Other Acceptance, D,E, VU,DU by AB at 5/16/2024 1701                                         User Key       Initials Effective Dates Name Provider Type Discipline    AB 09/22/22 -  Fina Mckeon, PT Physical Therapist PT                  PT Recommendation and Plan  Planned Therapy Interventions (PT): balance training, bed mobility training, gait training, home exercise program, patient/family education, postural re-education, transfer training, stretching, strengthening, ROM (range of motion)  Plan of Care Reviewed With: patient, spouse  Progress: no change  Outcome Evaluation: PT initial eval completed. Pt presents below her functional baseline with deficits related to NWB of RUE, impaired endurance, and mild balance deficits. PT issued SPC with education on sequencing, pt with excellent carryover. Ambulation of 200' with CGA and SPC was well tolerated. No overt LOB. PT rec d/c home with assist when medically appropriate.     Time Calculation:   PT Evaluation Complexity  History, PT Evaluation Complexity: 1-2 personal factors and/or comorbidities  Examination of Body Systems (PT Eval Complexity): total of 3 or more elements  Clinical Presentation (PT Evaluation Complexity): stable  Clinical Decision Making (PT Evaluation Complexity): low complexity  Overall Complexity (PT Evaluation Complexity): low complexity     PT Charges       Row Name 05/16/24 1702             Time Calculation    Start Time 1610  -AB      PT Received On 05/16/24  -AB      PT Goal Re-Cert Due Date 05/26/24  -AB         Untimed Charges    PT Eval/Re-eval Minutes 38  -AB         Total Minutes    Untimed Charges Total Minutes 38  -AB       Total  Minutes 38  -AB                User Key  (r) = Recorded By, (t) = Taken By, (c) = Cosigned By      Initials Name Provider Type    AB Fina Mckeon, PT Physical Therapist                  Therapy Charges for Today       Code Description Service Date Service Provider Modifiers Qty    01400908882 HC PT EVAL LOW COMPLEXITY 3 5/16/2024 Fina Mckeon, PT GP 1            PT G-Codes  Outcome Measure Options: AM-PAC 6 Clicks Basic Mobility (PT)  AM-PAC 6 Clicks Score (PT): 19  AM-PAC 6 Clicks Score (OT): 17  PT Discharge Summary  Anticipated Discharge Disposition (PT): home with assist    Fina Mckeon PT  5/16/2024

## 2024-05-16 NOTE — PLAN OF CARE
Goal Outcome Evaluation:  Plan of Care Reviewed With: patient, spouse        Progress: no change  Outcome Evaluation: Pt present with impaired balance, ADLs, and mobility warranting skilled OT services. Pt and family educated on R shoulder precautions, RUE HEP, ADL retraining, and sling management. Spouse demo'd understanding. Pt ambulated 150 ft with CGA for balance. Multiple LOB throughout. SpO2 maintained >94% on RA. Pt would benefit from PT evaluation prior to dc. Rec home with assist. No further OT needs at this time.      Anticipated Discharge Disposition (OT): home with assist

## 2024-05-16 NOTE — DISCHARGE INSTRUCTIONS
InfuBLOCK - Patient Information    What is a pain pump?  The InfuBLOCK pump delivers post-operative, non-narcotic, numbing medication to the nerve near the surgical site for pain relief.     Where can I find information about my pain pump?           For more information about your pain pump, scan the QR code.  For additional patient resources, visit [email or web address].                                                                                               While your physician is your primary source for information about your treatment there may be times during your treatment that you need assistance with your infusion pump.     If you need assistance take the following steps:    The [facility] Nursing Hotline is Here for You 24/7.  Please call [phone] for the following concerns or complications:    Answers to questions about your infusion pump                 Tubing disconnect  Assistance with pump alarms                                                      Dislodged catheter  Excessive leakage noted from pump                                         Inadequate pain control    2.   [facility] Anesthesia Acute Pain Service: [phone] is available 24/7 for any further needs or concerns about medication or pain control.     -------------------------------------------------------------------------    Nerve Catheter Removal Instructions  When your device is empty:    Remove your catheter by pulling the dressing off slowly (like you would remove a regular bandage). The catheter should pull right out of the skin.  Check that the BLUE tip is intact.                                                                                     If the catheter is stuck, reposition your   extremity and pull slowly until removed.  *If catheter is HURTING and WON'T come out, stop and call [phone] for further assistance.    Remove medication bag from the black carrying case.  Cut the  tubing on right and left side of pump, and discard the medication bag and tubing into garbage.  Place the pump and black carrying case into the plastic bag and then place this into the return box.  Seal box with blue stickers and return to [employer] postal service. THIS IS PRE-PAID POSTAGE.        -------------------------------------------------------------------------    [facility] COLD THERAPY - PATIENT INSTRUCTION SHEET    Cold Compression Therapy for your comfort and rehabilitation  Your caregivers want you to be productive in your rehab and comfortable during your stay. In keeping with those goals, you will be receiving an SMI Cold Therapy Wrap to help ease post-operative pain and swelling that might keep you from getting back on track! Your SMI Cold Therapy Wrap is effective and simple-to-use, and you will be encouraged to apply it throughout your hospital stay and at home through the duration of your recovery.    When you are ready to go home  Be sure to take your SMI Cold Therapy Wrap and both sets of Gel Bags with you for continued comfort and use throughout your rehabilitation. If you don't already have them, ask your nurse or aide to retrieve your SMI Gel Bags from the patient freezer.    Home use precautions  Always follow your medical professional's application instructions upon discharge. Your SMI Cold Therapy Wrap and Gel Bags are designed to last for months following your surgery. Never heat the Gel Bags unless specified by your healthcare provider. Supervision is advised when using this product on children or geriatric patients. To avoid danger of suffocation, please keep the outer plastic packaging away from children & pets.    Cold Therapy Instructions  Place Gel Bags in a freezer set ¾ of the way to max temperature for at least (4) hours. For best results, lay the Gel Bags flat and [id number] in the freezer. Once frozen, slide Gel Bags into the gel pouch and secure your wrap to the affected area with the  straps.  Gel wraps that have been stored in a freezer for an extended period of time may require a (10) minute period of softening up in a room temperature environment before application.  The gel pouch acts as a protective barrier. NEVER place frozen bags directly onto skin, as this may cause frostbite injury.  The [facility] Cold Therapy Wrap is designed to be able to be worm while ambulating. The compression straps can be secured well enough so that the Wrap won't fall off while moving.  Wrap Application Videos can be viewed at [email or web address].  An additional protective barrier such as clothing, a washcloth, hand-towel or pillowcase may be used during prolonged treatment applications.  The Gel-Pouch and Wrap are both Latex-Free and the Gel Bag ingredients are non toxic.    [facility] Wrap care instructions  The [facility] Cold Therapy Wrap may be hand washed and hung to dry when needed.    [facility] re-order information  Additional [facility] body specific wraps and/or Gel Bags can be re-ordered from [employer] or call [employer]WR[employer] ([phone])

## 2024-05-16 NOTE — ANESTHESIA PROCEDURE NOTES
Airway  Urgency: elective    Date/Time: 5/16/2024 9:42 AM  Airway not difficult    General Information and Staff    Patient location during procedure: OR  CRNA/CAA: Alonso Henley CRNA    Indications and Patient Condition  Indications for airway management: airway protection    Preoxygenated: yes  MILS not maintained throughout  Mask difficulty assessment: 1 - vent by mask    Final Airway Details  Final airway type: endotracheal airway      Successful airway: ETT  Cuffed: yes   Successful intubation technique: video laryngoscopy  Facilitating devices/methods: intubating stylet  Endotracheal tube insertion site: oral  Blade: [place]  Blade size: 3  ETT size (mm): 7.0  Cormack-Lehane Classification: grade I - full view of glottis  Placement verified by: chest auscultation and capnometry   Cuff volume (mL): 10  Measured from: lips  ETT/EBT  to lips (cm): 20  Number of attempts at approach: 1  Assessment: lips, teeth, and gum same as pre-op and atraumatic intubation    Additional Comments  Negative epigastric sounds, Breath sound equal bilaterally with symmetric chest rise and fall

## 2024-05-16 NOTE — OP NOTE
Operative Report Reverse Total Shoulder Arthroplasty    DATE OF OPERATION: 05/16/24    PREOPERATIVE DIAGNOSIS: right shoulder glenohumeral arthritis with severe glenoid erosion      POSTOPERATIVE DIAGNOSES:  1. same     PROCEDURES PERFORMED:  1. right reverse total shoulder arthroplasty.    \    SURGEON: Alen Santacruz MD        Assistant: Debbie Gonzalez PA  ** Please note the physician assistant was medically necessary to assist with positioning retraction, arm positioning, care of soft tissues and closure      ANESTHESIA: General plus block.      ESTIMATED BLOOD LOSS:100mL.      COMPLICATIONS: None.      DISPOSITION: Recovery room in stable condition.      IMPLANTS:  Tornier reverse total shoulder system  Humeral Stem: size 2+  Humeral Tray: as above  Polyethylene Liner: 33+0, 10 degree  Baseplate: 24, +3, short post pressfit  Glenosphere: 33+ +3 lateralized     INDICATIONS: This is a 73-year-old female with with right shoulder pain and limited function and motion secondary to above diagnosis. They have failed conservative treatment and after a discussion of risks, benefits, and alternatives, wished to proceed with shoulder arthroplasty.    DESCRIPTION OF PROCEDURE: On the day of surgery, the patient identified the right shoulder as the correct operative extremity. This was initialed by the surgeon with the patients's acknowledgment. The patient underwent placement of an interscalene block and was taken to the operating room and placed in the supine position. Upon induction of adequate anesthesia, the patient was brought up to the beach chair position and the shoulder and upper extremity were prepped and draped in the usual sterile fashion. Timeout confirmed the correct patient and operative extremity as well as that antibiotics were on board. A standard deltopectoral approach to the shoulder was carried out. It was carried sharply through the skin and subcutaneous tissue. Medial and lateral flaps were developed  over the deltopectoral fascia. The cephalic vein was identified and mobilized laterally with the deltoid. The subdeltoid and subpectoral spaces were mobilized and a blunt retractor was placed deep to this. The clavipectoral fascia was opened on the lateral edge of the conjoined tendon and the retractor was moved deep to this. The leading edge of the pectoralis was released exposing the long head of the biceps. This was tenosynovitic and therefore tenotomized. The 3 sisters were identified and coagulated. A subscapularis tenotomy was performed and rotator interval was released to the glenoid exposing the humeral head. The inferior capsule was released directly off the humerus to allow greater than 90° of external rotation. The anatomic neck was exposed and the humeral head osteotomy was performed in approximately 20° of retroversion. The remainder of the osteophytes were removed. The canal was then entered, reamed, and broached. The final stem impacted in in approximately 20° of retroversion. A head protector was placed. The humerus was subluxed posteriorly. The glenoid exposed. Circumferential labral excision and capsular release were performed. A  mobilization of the subscapularis was carried out as well.  A centering hole was drilled. The glenoid was gently reamed and then the  central hole for the baseplate was drilled  glenoid baseplate inserted.  Screws were then placed through the baseplate  The glenosphere was then inserted and locked into place with a set screw.  The humerus was carefully subluxed back anteriorly. A liner tray and polyethylene were placed and trialing was carried out. The appropriate final sizes were chosen and locked into place.  The shoulder was then reduced.  This allowed nearly full passive range of motion with no instability. The subscapularis was repaired through bone tunnels.  The joint was copiously irrigated with orthopedic irrigation mixed with Betadine after the final implants  were assembled and locked into place.      vancomycin powder was placed in the wound      The deltopectoral interval was approximated with 0 Vicryl, the subcutaneous tissue with 2-0 Vicryl, and the skin with nylon. A sterile dressing was placed. Anesthesia was reversed and the patient was taken to the recovery room in stable condition. All instrument, needle, and sponge counts were correct.      Alen Santacruz MD, MS

## 2024-05-16 NOTE — ANESTHESIA POSTPROCEDURE EVALUATION
Patient: Racheal De La Vega    Procedure Summary       Date: 05/16/24 [facility] / [facility]:  [facility]    Anesthesia Start: 0936 Anesthesia Stop: 1131    Procedure: REVERSE TOTAL SHOULDER ARTHROPLASTY - RIGHT (Right: Shoulder) Diagnosis:       Glenohumeral arthritis, right      (R shoulder glenohumeral arthritis)    Surgeons: Alen Santacruz MD Provider: Fina Palacios DO    Anesthesia Type: general with block ASA Status: 2            Anesthesia Type: general with block    Vitals  Vitals Value Taken Time   /58 05/16/24 1131   Temp 97.2 °F (36.2 °C) 05/16/24 1131   Pulse 77 05/16/24 1131   Resp 12 05/16/24 1131   SpO2 97 % 05/16/24 1131           Post Anesthesia Care and Evaluation    Patient location during evaluation: PACU  Patient participation: waiting for patient participation  Level of consciousness: sleepy but conscious  Pain score: 0  Pain management: adequate    Airway patency: patent  Anesthetic complications: No anesthetic complications  PONV Status: none  Cardiovascular status: hemodynamically stable and acceptable  Respiratory status: nonlabored ventilation, acceptable, nasal cannula and oral airway  Hydration status: acceptable

## 2024-05-16 NOTE — ANESTHESIA PROCEDURE NOTES
Peripheral Block      Patient reassessed immediately prior to procedure    Patient location during procedure: pre-op  Reason for block: at surgeon's request and post-op pain management  Performed by  CRNA/CAA: Nadir Rolon CRNA  Preanesthetic Checklist  Completed: patient identified, IV checked, site marked, risks and benefits discussed, surgical consent, monitors and equipment checked, pre-op evaluation and timeout performed  Prep:  Sterile barriers:cap, gloves, mask and washed/disinfected hands  Prep: ChloraPrep  Patient monitoring: blood pressure monitoring, continuous pulse oximetry and EKG  Procedure    Sedation: yes  Performed under: local infiltration  Guidance:ultrasound guided    ULTRASOUND INTERPRETATION.  Using ultrasound guidance a 20 G gauge needle was placed in close proximity to the nerve, at which point, under ultrasound guidance anesthetic was injected in the area of the nerve and spread of the anesthesia was seen on ultrasound in close proximity thereto.  There were no abnormalities seen on ultrasound; a digital image was taken; and the patient tolerated the procedure with no complications. Images:still images obtained, printed/placed on chart    Laterality:right  Block Type:interscalene  Injection Technique:catheter  Needle Type:Tuohy and echogenic  Needle Gauge:18 G  Resistance on Injection: none  Catheter Size:20 G (20g)  Cath Depth at skin: 10 cm    Medications Used: bupivacaine PF (MARCAINE) 0.25 % injection - Injection   15 mL - 5/16/2024 8:20:00 AM  fentaNYL citrate (PF) (SUBLIMAZE) injection - Intravenous   100 mcg - 5/16/2024 8:24:00 AM      Post Assessment  Injection Assessment: negative aspiration for heme, no paresthesia on injection and incremental injection  Patient Tolerance:comfortable throughout block  Complications:no  Additional Notes  CATHETER  A high-frequency linear transducer, with sterile cover, was placed in the supraclavicular fossa to identify the subclavian artery and  "trunks and divisions of the brachial plexus. The transducer was then moved in a cephalad orientation with a slight rotation to continue visualization of the brachial plexus from the trunks and divisions, on to the C5-C7 roots. The insertion site was prepped and draped in sterile fashion. Skin and cutaneous tissue was infiltrated with 2-5 ml of 1% Lidocaine. Using ultrasound-guidance, an 18-gauge Contiplex Ultra 360 Touhy needle was advanced in plane from lateral to medial. Preservative-free normal saline was utilized for hydro-dissection of tissue, advancement of Touhy, and to confirm final needle placement at the fascial plane between the middle scalene muscle and sheath of the brachial plexus (C5-C7). A 20-gauge Contiplex Echo catheter was placed through the needle and advance out the tip of the Touhy 3-5 cm with the \"Allen Flip\". The Touhy needle was then removed, and final catheter position verified lateral to the brachial plexus with local anesthetic (LA) and ultrasound visualization. The catheter was secured in the usual fashion with skin glue, benzoin, steri-strips, CHG tegaderm and label noting \"Nerve Block Catheter\". Jerk tape applied at yellow connector and catheter connection. All LA was injected in increments of 3-5 ml after catheter secured. Aspiration every 5 ml to prevent intravascular injection. Injection was completed with negative aspiration of blood and negative intravascular injection. Injection pressures were normal with minimal resistance.           "

## 2024-05-16 NOTE — PLAN OF CARE
Goal Outcome Evaluation:  Plan of Care Reviewed With: patient, spouse        Progress: no change  Outcome Evaluation: PT initial eval completed. Pt presents below her functional baseline with deficits related to NWB of RUE, impaired endurance, and mild balance deficits. PT issued SPC with education on sequencing, pt with excellent carryover. Ambulation of 200' with CGA and SPC was well tolerated. No overt LOB. PT rec d/c home with assist when medically appropriate.      Anticipated Discharge Disposition (PT): home with assist

## 2024-05-16 NOTE — H&P
Patient Name: Racheal De La Vega  MRN: [id number]  : 1950  DOS: 2024    Attending: Alen Santacruz MD    Primary Care Provider: Charlie Dominguez MD      Chief complaint: Right shoulder pain    Subjective   Patient is a pleasant 73 y.o. female presented for scheduled surgery by Dr. Santacruz.  She underwent right reverse total shoulder arthroplasty under general anesthesia.  She tolerated surgery well and was admitted for further medical management.  Her shoulder has been painful with limited range of motion for over a year.  She denies numbness, tingling or difficulty with  right hand.  She tried cortisone injection in January with short-term pain relief.    She is known to us from previous admission on 2024 for colon resection; which she recovered well.    When seen in PACU she is doing well.  Her pain is well-controlled.  She denies nausea, shortness of breath or chest pain.  No history of DVT or PE.    Allergies:  No Known Allergies    Meds:  Medications Prior to Admission   Medication Sig Dispense Refill Last Dose    acetaminophen (TYLENOL) 500 MG tablet Take 2 tablets by mouth Every 6 (Six) Hours As Needed for Mild Pain.   Past Month    Biotin 95328 MCG tablet Take 1 tablet by mouth Daily.   Past Week    Cholecalciferol (vitamin D3) 125 MCG (5000 UT) tablet tablet Take 1 tablet by mouth Daily.   Past Week    estradiol (VIVELLE-DOT) 0.025 MG/24HR patch Place 1 patch on the skin as directed by provider 2 (Two) Times a Week.   Past Week    Estriol-Estradiol (Bi-Est 50:50) cream Place  on the skin as directed by provider.   Past Week    Lactobacillus Probiotic tablet Take 1 tablet by mouth 2 (Two) Times a Day. 20 tablet 0 Past Week    magnesium oxide (MAG-OX) 400 MG tablet Take 1 tablet by mouth Daily.   Past Week    rosuvastatin (CRESTOR) 5 MG tablet Take 1 tablet by mouth Every Night.   Past Week    traMADol (ULTRAM) 50 MG tablet Take 1 tablet by mouth Every 6 (Six) Hours As Needed  for Moderate Pain. 30 tablet 0 Past Week    albuterol sulfate  (90 Base) MCG/ACT inhaler Inhale 1 puff Daily.   More than a month         History:   Past Medical History:   Diagnosis Date    Asthma     Colon polyp     Diverticulosis     Glaucoma     Headache     Hyperlipidemia      Past Surgical History:   Procedure Laterality Date    BLADDER SURGERY      PROLAPSED BLADDER    CATARACT EXTRACTION, BILATERAL      COLON RESECTION N/A 2024    Procedure: ROBOTIC LOW ANTERIOR RESECTION, TRANSANAL EXTRACTION OF SPECIMEN, TAKEDOWN OF COLOVAGINAL FISTULA;  Surgeon: Prachi Helton MD;  Location:  [facility];  Service: Robotics - [facility];  Laterality: N/A;    COLONOSCOPY      COLOVAGINAL FISTULA REPAIR      CYSTOSCOPY N/A 2024    Procedure: CYSTOSCOPY TEMPORARY PLACEMENT BILATERAL URETERAL CATHETER;  Surgeon: Yordan Reynoso MD;  Location:  [facility];  Service: Robotics - [facility];  Laterality: N/A;    GLAUCOMA SURGERY Bilateral     JOINT REPLACEMENT Left     SHOULDER    RETINAL DETACHMENT SURGERY Bilateral     TOE SURGERY Left     FIRST TOE    TOTAL KNEE ARTHROPLASTY Right     TOTAL SHOULDER ARTHROPLASTY Left 2022    Procedure: TOTAL REVERSE SHOULDER ARTHROPLASTY LEFT;  Surgeon: Alen Santacruz MD;  Location:  [facility];  Service: Orthopedics;  Laterality: Left;     Family History   Problem Relation Age of Onset    Heart disease Mother     Heart disease Father     Stroke Father      Social History     Tobacco Use    Smoking status: Former     Current packs/day: 0.00     Average packs/day: 0.3 packs/day for 20.0 years (5.0 ttl pk-yrs)     Types: Cigarettes     Start date:      Quit date:      Years since quittin.3    Smokeless tobacco: Never   Vaping Use    Vaping status: Never Used   Substance Use Topics    Alcohol use: No    Drug use: No   She is  with 2 children.  She is retired hairdresser.    Review of Systems  Pertinent items are noted in HPI, all other systems  "reviewed and negative    Vital Signs  /57   Pulse 94   Temp 97.6 °F (36.4 °C) (Temporal)   Resp 14   Ht 162.6 cm (64.02\")   Wt 69.7 kg (153 lb 10.6 oz)   SpO2 95%   BMI 26.36 kg/m²     Physical Exam:    General Appearance:    Alert, cooperative, in no acute distress   Head:    Normocephalic, without obvious abnormality, atraumatic   Eyes:            Lids and lashes normal, conjunctivae and sclerae normal, no   icterus, no pallor, corneas clear,    Ears:    Ears appear intact with no abnormalities noted   Throat:   No oral lesions, no thrush, oral mucosa moist   Neck:   No adenopathy, supple, trachea midline, no thyromegaly    Lungs:     Clear to auscultation,respirations regular, even and unlabored    Heart:    Regular rhythm and normal rate, normal S1 and S2, no murmur, no gallop   Abdomen:     Normal bowel sounds, no masses, no organomegaly, soft non-tender, non-distended, no guarding, no rebound  tenderness   Genitalia:    Deferred   Extremities: RUE sling, nerve block present.  Aquacel CDI.  Good movement and cap refill right digits   Pulses:   Pulses palpable and equal bilaterally   Skin:   No bleeding, bruising or rash   Neurologic:   Cranial nerves 2 - 12 grossly intact.         I reviewed the patient's new clinical results.       Results from last 7 days   Lab Units 05/15/24  1346   WBC 10*3/mm3 9.19   HEMOGLOBIN g/dL 14.1   HEMATOCRIT % 44.3   PLATELETS 10*3/mm3 336     Results from last 7 days   Lab Units 05/15/24  1346   SODIUM mmol/L 135*   POTASSIUM mmol/L 4.0   CHLORIDE mmol/L 98   CO2 mmol/L 27.0   BUN mg/dL 12   CREATININE mg/dL 0.76   CALCIUM mg/dL 9.4   BILIRUBIN mg/dL 0.3   ALK PHOS U/L 82   ALT (SGPT) U/L 14   AST (SGOT) U/L 21   GLUCOSE mg/dL 100*     Lab Results   Component Value Date    HGBA1C 5.50 05/15/2024         Assessment and Plan:     S/P reverse total shoulder arthroplasty, right    Glenohumeral arthritis, right    Hyperlipidemia      Plan  1. PT/OT-RUE sling, NWB  2. Pain " control-prns, interscalene block   3. IS-encourage  4. DVT proph- Mechs  5. Bowel regimen  6. Resume home medications as appropriate  7. Monitor post-op labs  8. DC planning for home    Hyperlipidemia  -Continue home statin      Ketty Crawford, NATALIE  05/16/24  14:37 EDT

## 2024-05-16 NOTE — THERAPY DISCHARGE NOTE
Acute Care - Occupational Therapy Discharge  [facility]    Patient Name: Racheal De La Vega  : 1950    MRN: [id number]                              Today's Date: 2024       Admit Date: 2024    Visit Dx:     ICD-10-CM ICD-9-CM   1. S/P reverse total shoulder arthroplasty, right  Z96.611 V43.61     Patient Active Problem List   Diagnosis    Primary localized osteoarthrosis of left shoulder region    Bursitis of left shoulder    Impingement syndrome of left shoulder    Glenohumeral arthritis, left    Left shoulder pain    S/P reverse total shoulder arthroplasty, left    Postoperative pain    Colovaginal fistula    Hyperlipidemia    Status post laparoscopic colectomy, robotic LAR, lysis of adhesions with colovaginal fistula takedown    Leukocytosis, likely reactive    Glenohumeral arthritis, right    S/P reverse total shoulder arthroplasty, right     Past Medical History:   Diagnosis Date    Asthma     Colon polyp     Diverticulosis     Glaucoma     Headache     Hyperlipidemia      Past Surgical History:   Procedure Laterality Date    BLADDER SURGERY      PROLAPSED BLADDER    CATARACT EXTRACTION, BILATERAL      COLON RESECTION N/A 2024    Procedure: ROBOTIC LOW ANTERIOR RESECTION, TRANSANAL EXTRACTION OF SPECIMEN, TAKEDOWN OF COLOVAGINAL FISTULA;  Surgeon: Prachi Helton MD;  Location: [facility] OR;  Service: Robotics - [facility];  Laterality: N/A;    COLONOSCOPY      COLOVAGINAL FISTULA REPAIR      CYSTOSCOPY N/A 2024    Procedure: CYSTOSCOPY TEMPORARY PLACEMENT BILATERAL URETERAL CATHETER;  Surgeon: Yordan Reynoso MD;  Location: [facility] OR;  Service: Robotics - [facility];  Laterality: N/A;    GLAUCOMA SURGERY Bilateral     JOINT REPLACEMENT Left     SHOULDER    RETINAL DETACHMENT SURGERY Bilateral     TOE SURGERY Left     FIRST TOE    TOTAL KNEE ARTHROPLASTY Right 2014    TOTAL SHOULDER ARTHROPLASTY Left 2022    Procedure: TOTAL REVERSE SHOULDER ARTHROPLASTY LEFT;  Surgeon:  Alen Santacruz MD;  Location: [facility];  Service: Orthopedics;  Laterality: Left;      General Information       Row Name 05/16/24 1606          OT Time and Intention    Document Type discharge evaluation/summary  -AN     Mode of Treatment occupational therapy  -AN       Row Name 05/16/24 1606          General Information    Patient Profile Reviewed yes  -AN     Prior Level of Function independent:;all household mobility;community mobility;gait;ADL's  -AN     Existing Precautions/Restrictions fall;non-weight bearing;brace on at all times;right;shoulder  RUE NWB in abduction sling, interscalene nerve cath  -AN     Barriers to Rehab medically complex  -AN       Row Name 05/16/24 1606          Living Environment    People in Home spouse  -AN       Row Name 05/16/24 1606          Home Main Entrance    Number of Stairs, Main Entrance none  -AN       Row Name 05/16/24 1606          Stairs Within Home, Primary    Number of Stairs, Within Home, Primary none  -AN       Row Name 05/16/24 1606          Cognition    Orientation Status (Cognition) oriented x 4  -AN       Row Name 05/16/24 1606          Safety Issues, Functional Mobility    Safety Issues Affecting Function (Mobility) safety precaution awareness;safety precautions follow-through/compliance  -AN     Impairments Affecting Function (Mobility) balance;strength  -AN               User Key  (r) = Recorded By, (t) = Taken By, (c) = Cosigned By      Initials Name Provider Type    AN Lawanda Cao OT Occupational Therapist                   Mobility/ADL's       Row Name 05/16/24 1618          Bed Mobility    Bed Mobility supine-sit  -AN     Supine-Sit [place] (Bed Mobility) verbal cues;contact guard  -AN     Bed Mobility, Safety Issues decreased use of arms for pushing/pulling;decreased use of legs for bridging/pushing  -AN     Assistive Device (Bed Mobility) head of bed elevated  -AN       Row Name 05/16/24 1618          Transfers    Transfers sit-stand  transfer;stand-sit transfer  -AN       Row Name 05/16/24 1618          Sit-Stand Transfer    Sit-Stand [place] (Transfers) contact guard  -AN       Row Name 05/16/24 1618          Stand-Sit Transfer    Stand-Sit [place] (Transfers) contact guard  -AN       Row Name 05/16/24 1618          Functional Mobility    Functional Mobility- Ind. Level verbal cues required;contact guard assist  -AN     Functional Mobility- Device other (see comments)  UE support  -AN     Functional Mobility-Distance (Feet) 150  -AN     Functional Mobility- Safety Issues sequencing ability decreased;step length decreased  -AN     Functional Mobility- Comment Pt ambulated 150 ft with CGA for balance and safety. Pt demo'd multiple LOB and would benefit from AD training.  -AN     Patient was able to Ambulate yes  -AN       Row Name 05/16/24 1618          Activities of Daily Living    BADL Assessment/Intervention upper body dressing;bathing;lower body dressing  -AN       Row Name 05/16/24 1618          Mobility    Extremity Weight-bearing Status right upper extremity   -AN     Right Upper Extremity (Weight-bearing Status) non weight-bearing (NWB)   -AN       Row Name 05/16/24 1618          Upper Body Dressing Assessment/Training    [place] Level (Upper Body Dressing) don;doff;moderate assist (50% patient effort);front opening garment  -AN     Position (Upper Body Dressing) edge of bed sitting  -AN     Comment, (Upper Body Dressing) Patient educated on shoulder precautions with ADL retraining and sling management. Patient educated on nerve catheter care to avoid dislodgement. Pt and spouse demo'd understanding of donning/doffing sling. Pt donned front opening shirt with Mod A and cues for sequencing of steps in order to maintain shoulder precautions.  -AN       Row Name 05/16/24 1618          Bathing Assessment/Intervention    Comment, (Bathing) Patient educated on shoulder precautions with axilla care. Patient educated that nerve  catheter cannot get wet.  -AN       Row Name 05/16/24 1618          Lower Body Dressing Assessment/Training    [place] Level (Lower Body Dressing) don;pants/bottoms;minimum assist (75% patient effort)  -AN     Position (Lower Body Dressing) edge of bed sitting  -AN               User Key  (r) = Recorded By, (t) = Taken By, (c) = Cosigned By      Initials Name Provider Type    AN Lawanda Cao OT Occupational Therapist                   Obj/Interventions       Row Name 05/16/24 1628          Sensory Assessment (Somatosensory)    Sensory Assessment (Somatosensory) right UE  -AN     Right UE Sensory Assessment general sensation  -AN     Sensory Subjective Reports numbness  -AN       Row Name 05/16/24 1628          Vision Assessment/Intervention    Visual Impairment/Limitations WFL  -AN       [facility] Name 05/16/24 1628          Range of Motion Comprehensive    General Range of Motion bilateral upper extremity ROM WFL  -AN       [facility] Name 05/16/24 1628          Strength Comprehensive (MMT)    General Manual Muscle Testing (MMT) Assessment upper extremity strength deficits identified  -AN     Comment, General Manual Muscle Testing (MMT) Assessment no formal MMT performed due to precautions  -AN       [facility] Name 05/16/24 1628          Elbow/Forearm (Therapeutic Exercise)    Elbow/Forearm (Therapeutic Exercise) AAROM (active assistive range of motion)  -AN     Elbow/Forearm AAROM (Therapeutic Exercise) left assist right;flexion;extension;supination;pronation;sitting;10 repetitions  -AN       Row Name 05/16/24 1628          Wrist (Therapeutic Exercise)    Wrist (Therapeutic Exercise) AROM (active range of motion)  -AN     Wrist AROM (Therapeutic Exercise) right;flexion;extension;10 repetitions  -AN       [facility] Name 05/16/24 1628          Hand (Therapeutic Exercise)    Hand (Therapeutic Exercise) AROM (active range of motion)  -AN     Hand AROM/AAROM (Therapeutic Exercise) right;AROM (active range of motion);finger  flexion;finger extension;10 repetitions  -AN       Row Name 05/16/24 1628          Motor Skills    Therapeutic Exercise elbow/forearm;wrist;hand;other (see comments)  Pt and family educated on RUE ther-ex per MD parameters. Handout issued.  -AN       Row Name 05/16/24 1628          Balance    Balance Assessment sitting static balance;sitting dynamic balance;sit to stand dynamic balance;standing static balance;standing dynamic balance  -AN     Static Sitting Balance supervision  -AN     Dynamic Sitting Balance supervision  -AN     Position, Sitting Balance sitting edge of bed  -AN     Sit to Stand Dynamic Balance verbal cues;contact guard  -AN     Static Standing Balance contact guard  -AN     Dynamic Standing Balance contact guard  -AN     Position/Device Used, Standing Balance supported  -AN     Balance Interventions standing;sit to stand;supported;static;dynamic;minimal challenge;occupation based/functional task  -AN               User Key  (r) = Recorded By, (t) = Taken By, (c) = Cosigned By      Initials Name Provider Type    AN Lawanda Cao, OT Occupational Therapist                   Goals/Plan    No documentation.                  Clinical Impression       Row Name 05/16/24 1630          Pain Assessment    Pretreatment Pain Rating 0/10 - no pain  -AN     Posttreatment Pain Rating 0/10 - no pain  -AN     Pre/Posttreatment Pain Comment asymptomatic  -AN     Pain Intervention(s) Repositioned;Ambulation/increased activity  -AN       Row Name 05/16/24 1630          Plan of Care Review    Plan of Care Reviewed With patient;spouse  -AN     Progress no change  -AN     Outcome Evaluation Pt present with impaired balance, ADLs, and mobility warranting skilled OT services. Pt and family educated on R shoulder precautions, RUE HEP, ADL retraining, and sling management. Spouse demo'd understanding. Pt ambulated 150 ft with CGA for balance. Multiple LOB throughout. SpO2 maintained >94% on RA. Pt would benefit from PT  evaluation prior to dc. Rec home with assist. No further OT needs at this time.  -AN       Row Name 05/16/24 1630          Therapy Assessment/Plan (OT)    Criteria for Skilled Therapeutic Interventions Met (OT) no problems identified which require skilled intervention  -AN     Therapy Frequency (OT) evaluation only  -AN       Row Name 05/16/24 1630          Therapy Plan Review/Discharge Plan (OT)    Anticipated Discharge Disposition (OT) home with assist  -AN       Row Name 05/16/24 1630          Vital Signs    Pre SpO2 (%) 95  -AN     O2 Delivery Pre Treatment room air  -AN     Intra SpO2 (%) 94  -AN     O2 Delivery Intra Treatment room air  -AN     Post SpO2 (%) 96  -AN     O2 Delivery Post Treatment room air  -AN     Pre Patient Position Supine  -AN     Intra Patient Position Standing  -AN     Post Patient Position Supine  -AN       Row Name 05/16/24 1630          Positioning and Restraints    Pre-Treatment Position in bed  -AN     Post Treatment Position bed  -AN     In Bed notified nsg;supine;call light within reach;encouraged to call for assist;exit alarm on;side rails up x2;with brace;legs elevated;with family/caregiver  -AN               User Key  (r) = Recorded By, (t) = Taken By, (c) = Cosigned By      Initials Name Provider Type    AN Lawanda Cao, SAQIB Occupational Therapist                   Outcome Measures       Row Name 05/16/24 1634          How much help from another is currently needed...    Putting on and taking off regular lower body clothing? 3  -AN     Bathing (including washing, rinsing, and drying) 3  -AN     Toileting (which includes using toilet bed pan or urinal) 3  -AN     Putting on and taking off regular upper body clothing 2  -AN     Taking care of personal grooming (such as brushing teeth) 3  -AN     Eating meals 3  -AN     AM-PAC 6 Clicks Score (OT) 17  -AN       Row Name 05/16/24 1634          Functional Assessment    Outcome Measure Options AM-PAC 6 Clicks Daily Activity (OT)   -AN               User Key  (r) = Recorded By, (t) = Taken By, (c) = Cosigned By      Initials Name Provider Type    Lawanda Li OT Occupational Therapist                  Occupational Therapy Education       Title: PT OT SLP Therapies (Done)       Topic: Occupational Therapy (Done)       Point: ADL training (Done)       Description:   Instruct learner(s) on proper safety adaptation and remediation techniques during self care or transfers.   Instruct in proper use of assistive devices.                  Learning Progress Summary             Patient Acceptance, E,TB,D,H, VU by AN at 5/16/2024 1634   Family Acceptance, E,TB,D,H, VU by AN at 5/16/2024 1634                         Point: Home exercise program (Done)       Description:   Instruct learner(s) on appropriate technique for monitoring, assisting and/or progressing therapeutic exercises/activities.                  Learning Progress Summary             Patient Acceptance, E,TB,D,H, VU by AN at 5/16/2024 1634   Family Acceptance, E,TB,D,H, VU by AN at 5/16/2024 1634                         Point: Precautions (Done)       Description:   Instruct learner(s) on prescribed precautions during self-care and functional transfers.                  Learning Progress Summary             Patient Acceptance, E,TB,D,H, VU by AN at 5/16/2024 1634   Family Acceptance, E,TB,D,H, VU by AN at 5/16/2024 1634                         Point: Body mechanics (Done)       Description:   Instruct learner(s) on proper positioning and spine alignment during self-care, functional mobility activities and/or exercises.                  Learning Progress Summary             Patient Acceptance, E,TB,D,H, VU by AN at 5/16/2024 1634   Family Acceptance, E,TB,D,H, VU by AN at 5/16/2024 1634                                         User Key       Initials Effective Dates Name Provider Type Discipline     09/21/21 -  Lawanda Cao OT Occupational Therapist OT                  OT  Recommendation and Plan  Therapy Frequency (OT): evaluation only  Plan of Care Review  Plan of Care Reviewed With: patient, spouse  Progress: no change  Outcome Evaluation: Pt present with impaired balance, ADLs, and mobility warranting skilled OT services. Pt and family educated on R shoulder precautions, RUE HEP, ADL retraining, and sling management. Spouse demo'd understanding. Pt ambulated 150 ft with CGA for balance. Multiple LOB throughout. SpO2 maintained >94% on RA. Pt would benefit from PT evaluation prior to dc. Rec home with assist. No further OT needs at this time.  Plan of Care Reviewed With: patient, spouse  Outcome Evaluation: Pt present with impaired balance, ADLs, and mobility warranting skilled OT services. Pt and family educated on R shoulder precautions, RUE HEP, ADL retraining, and sling management. Spouse demo'd understanding. Pt ambulated 150 ft with CGA for balance. Multiple LOB throughout. SpO2 maintained >94% on RA. Pt would benefit from PT evaluation prior to dc. Rec home with assist. No further OT needs at this time.     Time Calculation:   Evaluation Complexity (OT)  Review Occupational Profile/Medical/Therapy History Complexity: brief/low complexity  Assessment, Occupational Performance/Identification of Deficit Complexity: 1-3 performance deficits  Clinical Decision Making Complexity (OT): problem focused assessment/low complexity  Overall Complexity of Evaluation (OT): low complexity     Time Calculation- OT       Row Name 05/16/24 1635             Time Calculation- OT    OT Start Time 1500  -AN      OT Received On 05/16/24  -AN         Timed Charges    06426 - OT Therapeutic Exercise Minutes 10  -AN      00195 - OT Self Care/Mgmt Minutes 15  -AN         Untimed Charges    OT Eval/Re-eval Minutes 46  -AN         Total Minutes    Timed Charges Total Minutes 25  -AN      Untimed Charges Total Minutes 46  -AN       Total Minutes 71  -AN                User Key  (r) = Recorded By, (t) =  Taken By, (c) = Cosigned By      Initials Name Provider Type    Lawanda Li OT Occupational Therapist                  Therapy Charges for Today       Code Description Service Date Service Provider Modifiers Qty    16928210074  OT THER PROC EA 15 MIN 5/16/2024 Lawanda Cao OT GO 1    [id number]  OT SELF CARE/MGMT/TRAIN EA 15 MIN 5/16/2024 Lawanda Cao OT GO 1    [id number]  OT EVAL LOW COMPLEXITY 4 5/16/2024 Lawanda Cao OT GO 1               OT Discharge Summary  Anticipated Discharge Disposition (OT): home with assist  Reason for Discharge: All goals achieved  Discharge Destination: Home with assist    Lawanda Cao OT  5/16/2024

## 2024-05-16 NOTE — H&P
Pre-Op H&P  Racheal De La Vega  [id number]  1950      Chief complaint: Right shoulder pain      Subjective:  Patient is a 73 y.o.female presents for scheduled surgery by Dr. Santacruz. She anticipates a REVERSE TOTAL SHOULDER ARTHROPLASTY - RIGHT - Right today.  The patient has had right shoulder pain for the past year.  The pain is present only with movement.  Certain positioning has helped to alleviate the pain.  She also uses pain cream when needed.  She denies having any other associated symptoms with her present condition.    Review of Systems:  Constitutional-- No fever, chills or sweats. No fatigue.  CV-- No chest pain, palpitation or syncope. +HLD.  Resp-- No SOB, cough, hemoptysis  Skin--No rashes or lesions      Allergies: No Known Allergies      Home Meds:  Medications Prior to Admission   Medication Sig Dispense Refill Last Dose    acetaminophen (TYLENOL) 500 MG tablet Take 2 tablets by mouth Every 6 (Six) Hours As Needed for Mild Pain.   Past Month    Biotin 18502 MCG tablet Take 1 tablet by mouth Daily.   Past Week    Cholecalciferol (vitamin D3) 125 MCG (5000 UT) tablet tablet Take 1 tablet by mouth Daily.   Past Week    estradiol (VIVELLE-DOT) 0.025 MG/24HR patch Place 1 patch on the skin as directed by provider 2 (Two) Times a Week.   Past Week    Estriol-Estradiol (Bi-Est 50:50) cream Place  on the skin as directed by provider.   Past Week    Lactobacillus Probiotic tablet Take 1 tablet by mouth 2 (Two) Times a Day. 20 tablet 0 Past Week    magnesium oxide (MAG-OX) 400 MG tablet Take 1 tablet by mouth Daily.   Past Week    rosuvastatin (CRESTOR) 5 MG tablet Take 1 tablet by mouth Every Night.   Past Week    traMADol (ULTRAM) 50 MG tablet Take 1 tablet by mouth Every 6 (Six) Hours As Needed for Moderate Pain. 30 tablet 0 Past Week    albuterol sulfate  (90 Base) MCG/ACT inhaler Inhale 1 puff Daily.   More than a month    amoxicillin-clavulanate (AUGMENTIN) 875-125 MG per tablet Take 1  "tablet by mouth 2 (Two) Times a Day. 14 tablet 0          PMH:   Past Medical History:   Diagnosis Date    Asthma     Colon polyp     Diverticulosis     Glaucoma     Headache     Hyperlipidemia      PSH:    Past Surgical History:   Procedure Laterality Date    BLADDER SURGERY      PROLAPSED BLADDER    CATARACT EXTRACTION, BILATERAL      COLON RESECTION N/A 2024    Procedure: ROBOTIC LOW ANTERIOR RESECTION, TRANSANAL EXTRACTION OF SPECIMEN, TAKEDOWN OF COLOVAGINAL FISTULA;  Surgeon: Prachi Helton MD;  Location:  [facility];  Service: [facility]otics - [facility];  Laterality: N/A;    COLONOSCOPY      COLOVAGINAL FISTULA REPAIR      CYSTOSCOPY N/A 2024    Procedure: CYSTOSCOPY TEMPORARY PLACEMENT BILATERAL URETERAL CATHETER;  Surgeon: Yordan Reynoso MD;  Location:  [facility];  [facility];  Laterality: N/A;    GLAUCOMA SURGERY Bilateral     JOINT REPLACEMENT Left     SHOULDER    RETINAL DETACHMENT SURGERY Bilateral     TOE SURGERY Left     FIRST TOE    TOTAL KNEE ARTHROPLASTY Right     TOTAL SHOULDER ARTHROPLASTY Left 2022    Procedure: TOTAL REVERSE SHOULDER ARTHROPLASTY LEFT;  Surgeon: Alen Santacruz MD;  Location:  [facility];  Service: Orthopedics;  Laterality: Left;       Immunization History:  Influenza: No  Pneumococcal: No  Tetanus: No  Covid : No    Social History:   Tobacco:   Social History     Tobacco Use   Smoking Status Former    Current packs/day: 0.00    Average packs/day: 0.3 packs/day for 20.0 years (5.0 ttl pk-yrs)    Types: Cigarettes    Start date:     Quit date:     Years since quittin.3   Smokeless Tobacco Never      Alcohol:     Social History     Substance and Sexual Activity   Alcohol Use No         Physical Exam:/91 (BP Location: Left arm, Patient Position: Lying)   Pulse 81   Temp 97.3 °F (36.3 °C) (Temporal)   Resp 18   Ht 162.6 cm (64.02\")   Wt 69.7 kg (153 lb 10.6 oz)   SpO2 96%   BMI 26.36 kg/m²       General " Appearance:    Alert, cooperative, no distress, appears stated age   Head:    Normocephalic, without obvious abnormality, atraumatic   Lungs:     Clear to auscultation bilaterally, respirations unlabored    Heart:   Regular rate and rhythm, S1 and S2 normal    Abdomen:    Soft without tenderness   Extremities:   Extremities normal, atraumatic, no cyanosis or edema   Skin:   Skin color, texture, turgor normal, no rashes or lesions   Neurologic:   Grossly intact     Results Review:     LABS:  Lab Results   Component Value Date    WBC 9.19 05/15/2024    HGB 14.1 05/15/2024    HCT 44.3 05/15/2024    MCV 91.5 05/15/2024     05/15/2024    NEUTROABS 5.64 05/15/2024    GLUCOSE 100 (H) 05/15/2024    BUN 12 05/15/2024    CREATININE 0.76 05/15/2024    EGFRIFNONA 79 09/18/2021     (L) 05/15/2024    K 4.0 05/15/2024    CL 98 05/15/2024    CO2 27.0 05/15/2024    MG 2.0 01/10/2024    CALCIUM 9.4 05/15/2024    ALBUMIN 4.2 05/15/2024    AST 21 05/15/2024    ALT 14 05/15/2024    BILITOT 0.3 05/15/2024       RADIOLOGY:  Imaging Results (Last 72 Hours)       ** No results found for the last 72 hours. **            I reviewed the patient's new clinical results.    Cancer Staging (if applicable)  Cancer Patient: __ yes _x_no __unknown; If yes, clinical stage T:__ N:__M:__, stage group or __N/A      Impression: Right Shoulder Arthritis      Plan: REVERSE TOTAL SHOULDER ARTHROPLASTY - RIGHT - Right       Ramon Krause, APRN   5/16/2024   08:40 EDT

## (undated) DEVICE — INTENDED TO SUPPORT AND MAINTAIN THE POSITION OF AN ANESTHETIZED PATIENT DURING SURGERY: Brand: ERIN BEACH CHAIR FACE MASK

## (undated) DEVICE — TBG PENCL TELESCP MEGADYNE SMOKE EVAC 10FT

## (undated) DEVICE — ST TBG AIRSEAL FLTR TRI LUM

## (undated) DEVICE — GW SENSR DUALFLEX NITNL STR .038IN 3X150CM

## (undated) DEVICE — INTENDED FOR TISSUE SEPARATION, AND OTHER PROCEDURES THAT REQUIRE A SHARP SURGICAL BLADE TO PUNCTURE OR CUT.: Brand: BARD-PARKER ® CARBON RIB-BACK BLADES

## (undated) DEVICE — SLNG ULTRSLING2 11TO13IN MD

## (undated) DEVICE — SEAL

## (undated) DEVICE — SAFESECURE,SECUREMENT,FOLEY CATH,STERILE: Brand: MEDLINE

## (undated) DEVICE — UNDERGLV SURG BIOGEL INDICAT PI SZ8 BLU

## (undated) DEVICE — UNDERGLV SURG BIOGEL INDICAT PF 61/2 GRN

## (undated) DEVICE — SUT MNCRYL PLS ANTIB UD 4/0 PS2 18IN

## (undated) DEVICE — SYS CLS SKIN PREMIERPRO EXOFINFUSION 22CM

## (undated) DEVICE — SPNG GZ WOVN 4X4IN 12PLY 10/BX STRL

## (undated) DEVICE — PATIENT RETURN ELECTRODE, SINGLE-USE, CONTACT QUALITY MONITORING, ADULT, WITH 9FT CORD, FOR PATIENTS WEIGING OVER 33LBS. (15KG): Brand: MEGADYNE

## (undated) DEVICE — 1010 S-DRAPE TOWEL DRAPE 10/BX: Brand: STERI-DRAPE™

## (undated) DEVICE — GLV SURG SENSICARE PI ORTHO SZ7.5 LF STRL

## (undated) DEVICE — 3M™ IOBAN™ 2 ANTIMICROBIAL INCISE DRAPE 6651EZ: Brand: IOBAN™ 2

## (undated) DEVICE — GLV SURG DERMASSURE GRN LF PF 7.0

## (undated) DEVICE — LEGGINGS, PAIR, 29X43, STERILE: Brand: MEDLINE

## (undated) DEVICE — TOTAL TRAY, 16FR 10ML SIL FOLEY, URN: Brand: MEDLINE

## (undated) DEVICE — TIP COVER ACCESSORY

## (undated) DEVICE — 450 ML BOTTLE OF 0.05% CHLORHEXIDINE GLUCONATE IN 99.95% STERILE WATER FOR IRRIGATION, USP AND APPLICATOR.: Brand: IRRISEPT ANTIMICROBIAL WOUND LAVAGE

## (undated) DEVICE — SPNG LAP PREWSH SFTPK 18X18IN STRL PK/5

## (undated) DEVICE — SWABSTK SKINPREP PVPI PRE/SAT 8IN STRL

## (undated) DEVICE — PK MAJ SHLDR SPLT 10

## (undated) DEVICE — BLADELESS OBTURATOR: Brand: WECK VISTA

## (undated) DEVICE — CATH URETRL FLXITP POLLACK STD 5F 70CM

## (undated) DEVICE — PK UROL DAVINCI 10

## (undated) DEVICE — T-MAX DISPOSABLE FACE MASK 8 PER BOX

## (undated) DEVICE — COLUMN DRAPE

## (undated) DEVICE — BLANKT WARM UPPR/BDY ARM/OUT 57X196CM

## (undated) DEVICE — PUMP PAIN AUTOFUSER AUTO SELCT NOBOLUS 1TO14ML/HR 550ML DISP

## (undated) DEVICE — TRENDELENBURG WINGPAD POSITIONING KIT DELUXE - WITHOUT BODY STRAP: Brand: SOULE MEDICAL

## (undated) DEVICE — ANTIBACTERIAL UNDYED BRAIDED (POLYGLACTIN 910), SYNTHETIC ABSORBABLE SUTURE: Brand: COATED VICRYL

## (undated) DEVICE — DRAPE,REIN 53X77,STERILE: Brand: MEDLINE

## (undated) DEVICE — PULLOVER TOGA, 2X LARGE: Brand: FLYTE, SURGICOOL

## (undated) DEVICE — SYR SLP TP 10ML DISP

## (undated) DEVICE — PREMIUM DRY TRAY LF: Brand: MEDLINE INDUSTRIES, INC.

## (undated) DEVICE — SKN PREP SPNG STKS PVP PNT STR: Brand: MEDLINE INDUSTRIES, INC.

## (undated) DEVICE — JELLY,LUBE,STERILE,FLIP TOP,TUBE,2-OZ: Brand: MEDLINE

## (undated) DEVICE — SUT VIC 2/0 CT2 27IN J269H

## (undated) DEVICE — TRAP FLD MINIVAC MEGADYNE 100ML

## (undated) DEVICE — BLANKT WARM LOWR/BDY 100X120CM

## (undated) DEVICE — TOWEL,OR,DSP,ST,BLUE,STD,4/PK,20PK/CS: Brand: MEDLINE

## (undated) DEVICE — GLV SURG PREMIERPRO MIC LTX PF SZ6.5 BRN

## (undated) DEVICE — VESSEL SEALER EXTEND: Brand: ENDOWRIST

## (undated) DEVICE — 3M™ STERI-DRAPE™ U-DRAPE 1015: Brand: STERI-DRAPE™

## (undated) DEVICE — DRAPE,UNDERBUTTOCKS,PCH,STERILE: Brand: MEDLINE

## (undated) DEVICE — GLV SURG PREMIERPRO GAMMEX NEOPRN PF SZ7.5 GRN

## (undated) DEVICE — STPCK 4WY ON/OFF VLV M/COLAR FIT 45PSI STRL

## (undated) DEVICE — 3M™ IOBAN™ 2 ANTIMICROBIAL INCISE DRAPE 6650EZ: Brand: IOBAN™ 2

## (undated) DEVICE — TROC BLADLES ANCHORPORT/OPTI LP 8X120MM 1P/U

## (undated) DEVICE — ARM DRAPE

## (undated) DEVICE — KT PUMP INFUBLOCK MDL 2100 PMKITSOLIS

## (undated) DEVICE — SCRW AEQUALIS PERFORM CENTRL 6.5X30MM
Type: IMPLANTABLE DEVICE | Site: SHOULDER | Status: NON-FUNCTIONAL
Removed: 2022-03-18

## (undated) DEVICE — OSCILLATING TIP SAW CARTRIDGE: Brand: PRECISION FALCON

## (undated) DEVICE — GLV SURG SENSICARE PI MIC PF SZ7.5 LF STRL

## (undated) DEVICE — SOL ISO/ALC RUB 70PCT 4OZ

## (undated) DEVICE — STRYKER PERFORMANCE SERIES SAGITTAL BLADE: Brand: STRYKER PERFORMANCE SERIES

## (undated) DEVICE — DRSNG SURG AQUACEL AG 9X25CM